# Patient Record
Sex: FEMALE | Race: WHITE | Employment: OTHER | URBAN - METROPOLITAN AREA
[De-identification: names, ages, dates, MRNs, and addresses within clinical notes are randomized per-mention and may not be internally consistent; named-entity substitution may affect disease eponyms.]

---

## 2017-01-03 ENCOUNTER — HOSPITAL ENCOUNTER (OUTPATIENT)
Dept: ULTRASOUND IMAGING | Facility: CLINIC | Age: 52
End: 2017-01-03
Attending: INTERNAL MEDICINE
Payer: COMMERCIAL

## 2017-01-03 ENCOUNTER — HOSPITAL ENCOUNTER (OUTPATIENT)
Dept: MAMMOGRAPHY | Facility: CLINIC | Age: 52
Discharge: HOME | End: 2017-01-03
Attending: INTERNAL MEDICINE | Admitting: INTERNAL MEDICINE
Payer: COMMERCIAL

## 2017-01-03 DIAGNOSIS — N63.0 LUMP OR MASS IN BREAST: ICD-10-CM

## 2017-01-03 DIAGNOSIS — R22.31 AXILLARY LUMP, RIGHT: ICD-10-CM

## 2017-01-03 PROCEDURE — 76642 ULTRASOUND BREAST LIMITED: CPT | Mod: LT

## 2017-01-03 PROCEDURE — G0204 DX MAMMO INCL CAD BI: HCPCS

## 2017-01-04 ENCOUNTER — HOSPITAL ENCOUNTER (OUTPATIENT)
Dept: ULTRASOUND IMAGING | Facility: CLINIC | Age: 52
Discharge: HOME | End: 2017-01-04
Attending: INTERNAL MEDICINE | Admitting: INTERNAL MEDICINE
Payer: COMMERCIAL

## 2017-01-04 DIAGNOSIS — R22.1 NECK SWELLING: ICD-10-CM

## 2017-01-04 PROCEDURE — 76536 US EXAM OF HEAD AND NECK: CPT

## 2017-01-06 ENCOUNTER — TELEPHONE (OUTPATIENT)
Dept: PEDIATRICS | Facility: CLINIC | Age: 52
End: 2017-01-06

## 2017-01-06 NOTE — TELEPHONE ENCOUNTER
Patient was called and advised.  She verbalized understanding.  She stated she will think about it and get back to us.    Camryn Diop RN  Message handled by Nurse Triage.

## 2017-01-06 NOTE — TELEPHONE ENCOUNTER
I can take over prescribing the robaxin. I'm not sure if insurance will cover the trintellix if not prescribed by psychiatry, but I can try prescribing it and see what happens. If it stops working, she will need to return to see psychiatry which might cause a delay in her treatment.    Please let know.    Halle De Los Santos MD  Internal Medicine/Pediatrics

## 2017-01-06 NOTE — TELEPHONE ENCOUNTER
Patient calls with an update.  Is not interested in seeing psychiatry any longer-will still see psychology, as discussed at the last appointment.  Is scheduled for the first time next week.  Patient asking to let Dr De Los Santos know that she was getting Trintellix and Robaxin from her past psychiatrist.  She is asking if Dr De Los Santos would take over prescribing.  She does not need refills at this time.    Patient states that Dr De Los Santos is aware of this.  I see in the notes that Dr De Los Santos wanted patient to continue seeing a psychiatrist.  Please review and advise.    vortioxetine (TRINTELLIX) 10 MG tablet and methocarbamol (ROBAXIN) 500 MG tablet.    Camryn Diop RN  Message handled by Nurse Triage.

## 2017-01-12 ENCOUNTER — CARE COORDINATION (OUTPATIENT)
Dept: CARE COORDINATION | Facility: CLINIC | Age: 52
End: 2017-01-12

## 2017-01-12 NOTE — PROGRESS NOTES
Clinic Care Coordination Contact  [facility]/Voicemail    Referral Source: CTS  Clinical Data: Care Coordinator Outreach  Outreach attempted x 1.  Left message on voicemail with call back information and requested return call.     Plan: Care Coordinator will try to reach patient again in 3-4 weeks.  SAPPHIRE Davis, [facility]  Social Work - Care Coordinator  [facility] [place] [place], and [place]  Phone: [phone]

## 2017-01-18 ENCOUNTER — OFFICE VISIT (OUTPATIENT)
Dept: PSYCHOLOGY | Facility: CLINIC | Age: 52
End: 2017-01-18
Payer: COMMERCIAL

## 2017-01-18 DIAGNOSIS — F33.1 MODERATE EPISODE OF RECURRENT MAJOR DEPRESSIVE DISORDER (H): Primary | ICD-10-CM

## 2017-01-18 DIAGNOSIS — F41.1 GAD (GENERALIZED ANXIETY DISORDER): ICD-10-CM

## 2017-01-18 PROCEDURE — 90791 PSYCH DIAGNOSTIC EVALUATION: CPT | Performed by: SOCIAL WORKER

## 2017-01-18 ASSESSMENT — PATIENT HEALTH QUESTIONNAIRE - PHQ9: 5. POOR APPETITE OR OVEREATING: NEARLY EVERY DAY

## 2017-01-18 ASSESSMENT — ANXIETY QUESTIONNAIRES
7. FEELING AFRAID AS IF SOMETHING AWFUL MIGHT HAPPEN: SEVERAL DAYS
1. FEELING NERVOUS, ANXIOUS, OR ON EDGE: MORE THAN HALF THE DAYS
3. WORRYING TOO MUCH ABOUT DIFFERENT THINGS: MORE THAN HALF THE DAYS
GAD7 TOTAL SCORE: 13
5. BEING SO RESTLESS THAT IT IS HARD TO SIT STILL: MORE THAN HALF THE DAYS
2. NOT BEING ABLE TO STOP OR CONTROL WORRYING: MORE THAN HALF THE DAYS
6. BECOMING EASILY ANNOYED OR IRRITABLE: SEVERAL DAYS
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
## 2017-01-24 ASSESSMENT — PATIENT HEALTH QUESTIONNAIRE - PHQ9: SUM OF ALL RESPONSES TO PHQ QUESTIONS 1-9: 17

## 2017-01-24 ASSESSMENT — ANXIETY QUESTIONNAIRES: GAD7 TOTAL SCORE: 13

## 2017-01-31 ENCOUNTER — TRANSFERRED RECORDS (OUTPATIENT)
Dept: HEALTH INFORMATION MANAGEMENT | Facility: CLINIC | Age: 52
End: 2017-01-31

## 2017-02-02 ENCOUNTER — CARE COORDINATION (OUTPATIENT)
Dept: CARE COORDINATION | Facility: CLINIC | Age: 52
End: 2017-02-02

## 2017-02-02 NOTE — Clinical Note
[place] CARE COORDINATION  [address] [address]  Phone: [phone]      February 2, 2017      Cristela Smith  [address]  [place] [address]    Dear Cristela,  I am the Clinic Care Coordinator that works with your primary care provider's clinic. I recently tried to call and was unable to reach you. Below is a description of what Clinic Care Coordination is and how I can further assist you.     The Clinic Care Coordinator role is a Registered Nurse and/or  who understands the health care system. The goal of Clinic Care Coordination is to help you manage your health and improve access to the [facility] system in the most efficient manner.  The Registered Nurse can assist you in meeting your health care goals by providing education, coordinating services, and strengthening the communication among your providers. The  can assist you with financial, behavioral, psychosocial, and chemical dependency and counseling/psychiatric resources.    Please feel free to keep this letter and contact information to contact me at [phone] with any further questions or concerns that may arise. We at [facility] are focused on providing you with the highest-quality healthcare experience possible and that all starts with you.       Sincerely,     Jessie Lane

## 2017-02-02 NOTE — PROGRESS NOTES
Clinic Care Coordination Contact  [facility]/Voicemail    Referral Source: [facility]  Clinical Data: Care Coordinator Outreach  Outreach attempted x 2.  Left message on voicemail with call back information and requested return call.    Plan: Care Coordinator mailed out care coordination introduction letter on 02/02/17. Care Coordinator will try to reach patient again in 10-14 business days.    SAPPHIRE Davis, [facility]  Social Work - Care Coordinator  [facility]- [place], [place], and [place]  Phone: [phone]

## 2017-02-03 DIAGNOSIS — N95.1 MENOPAUSAL SYNDROME (HOT FLASHES): Primary | ICD-10-CM

## 2017-02-07 ENCOUNTER — TELEPHONE (OUTPATIENT)
Dept: PEDIATRICS | Facility: CLINIC | Age: 52
End: 2017-02-07

## 2017-02-07 DIAGNOSIS — H10.33 ACUTE CONJUNCTIVITIS OF BOTH EYES: Primary | ICD-10-CM

## 2017-02-07 NOTE — TELEPHONE ENCOUNTER
Patient calls.  Was seen by [facility] last night and dx with conjunctivitis.  Treated with Polytrim drops at 2 drops in the affected eye every 2 hours first day and then one drop every 4 hours thereafter.  She states that her eye was closed shut and had mattering and was burning yesterday.  Just started medication this morning at 10 am and has not seen any improvement.  Patient was told to follow up with PCP in 1-2 days.      Advised patient to call tomorrow if no improved for appointment-we can ask to use same day appointment.  Patient verbalized understanding.  Advised to have a  to take her to the appointment.    Camryn Diop RN  Message handled by Nurse Triage.

## 2017-02-08 NOTE — TELEPHONE ENCOUNTER
Request received for more documentation. Notes from PA last year printed and faxed.  Dalila Zabala LPN

## 2017-02-08 NOTE — TELEPHONE ENCOUNTER
Discussed with Dr. De Los Santos and would advise that patient be seen by Opthamology if openings available.   If not, will see patient here in clinic.  I called [facility] and they are able to see patient in their [place] clinic this afternoon.  Patient advised of this, and gave her the number to call to set up an appointment.  Patient in agreement, and will proceed there for evaluation.  No further questions.  DAVID Cassidy RN

## 2017-02-08 NOTE — TELEPHONE ENCOUNTER
Patient called, she can't open her right eye, when does it burns and very blurry. Please call patient back at [phone] ASAP.    Thank you,    Yareli Dsouza

## 2017-02-08 NOTE — TELEPHONE ENCOUNTER
Patient called back.  Appointment scheduled with Dr. De Los Santos this afternoon.  Is very light sensitive.  Eye remains swollen.  Urgency room told patient to follow up with primary.  Ok for appointment here, or should she see an opthamologist-conjunctivitis vs cellulitis vs corneal abrasion?  DAVID Cassidy RN

## 2017-02-14 ENCOUNTER — TRANSFERRED RECORDS (OUTPATIENT)
Dept: HEALTH INFORMATION MANAGEMENT | Facility: CLINIC | Age: 52
End: 2017-02-14

## 2017-02-15 DIAGNOSIS — F51.01 PRIMARY INSOMNIA: Primary | ICD-10-CM

## 2017-02-15 RX ORDER — TRAZODONE HYDROCHLORIDE 50 MG/1
50 TABLET, FILM COATED ORAL
Qty: 90 TABLET | Refills: 1 | Status: SHIPPED | OUTPATIENT
Start: 2017-02-15 | End: 2017-08-25

## 2017-02-15 RX ORDER — TRAZODONE HYDROCHLORIDE 150 MG/1
150 TABLET ORAL AT BEDTIME
Qty: 90 TABLET | Refills: 1 | Status: SHIPPED | OUTPATIENT
Start: 2017-02-15 | End: 2017-08-25

## 2017-02-15 NOTE — TELEPHONE ENCOUNTER
Pt LM at 12:53 pm to check on PA status, call her back on [phone].     Called pt back & notified as below. Pt agrees to the plan. Signed the pended medDolores Banuelos RN  Triage Nurse

## 2017-02-15 NOTE — TELEPHONE ENCOUNTER
Please call patient. Menest not covered. Need to try formulary alternatives as below. vagifem tablets not appropriate for systemic symptoms. Climara patches are the same hormone as the estradiol pills she tried in the past and did not control symptoms. Recommend trial of switching to estropipate. New rx sent.     Halle De Los Santos MD  Internal Medicine/Pediatrics

## 2017-02-15 NOTE — TELEPHONE ENCOUNTER
Pt LM at 12:53 pm that another physician used to rx Trazodone 150 mg + 50 mg 1 tab each qd(200 mg qd) &  agreed to take over prescribing this med for her. Requesting us to send the rx to [facility] pharmacy is [place]. Call back # [phone](OK to LM).     Called [facility] pharmacy to get clarification on med & dose. As per pharmacist, pt has been getting 2 separate rx for Trazodone 150 mg + 50 mg(200 mg qd) from Dr.Sarah Gallo(Ph#[phone]). Last rx was on 01/19/17 for 30# each. Pt has been taking this med for the past 3 months.    Pended med, please review & sign, if appropriate. Thanks.    Vin, RN  Triage Nurse

## 2017-02-23 ENCOUNTER — HOSPITAL ENCOUNTER (OUTPATIENT)
Dept: MRI IMAGING | Facility: CLINIC | Age: 52
Discharge: HOME | End: 2017-02-23
Attending: PHYSICIAN ASSISTANT | Admitting: PHYSICIAN ASSISTANT
Payer: COMMERCIAL

## 2017-02-23 DIAGNOSIS — M54.9 BACK PAIN, UNSPECIFIED BACK LOCATION, UNSPECIFIED BACK PAIN LATERALITY, UNSPECIFIED CHRONICITY: ICD-10-CM

## 2017-02-23 PROCEDURE — 72141 MRI NECK SPINE W/O DYE: CPT

## 2017-03-08 ENCOUNTER — TRANSFERRED RECORDS (OUTPATIENT)
Dept: HEALTH INFORMATION MANAGEMENT | Facility: CLINIC | Age: 52
End: 2017-03-08

## 2017-03-10 ENCOUNTER — RESULTS ONLY (OUTPATIENT)
Dept: OTHER | Facility: CLINIC | Age: 52
End: 2017-03-10

## 2017-03-10 DIAGNOSIS — E53.8 VITAMIN B12 DEFICIENCY (NON ANEMIC): ICD-10-CM

## 2017-03-10 DIAGNOSIS — M51.16 NEURITIS OR RADICULITIS DUE TO RUPTURE OF LUMBAR INTERVERTEBRAL DISC: Primary | ICD-10-CM

## 2017-03-10 PROCEDURE — 81374 HLA I TYPING 1 ANTIGEN LR: CPT | Performed by: ANESTHESIOLOGY

## 2017-03-10 PROCEDURE — 36415 COLL VENOUS BLD VENIPUNCTURE: CPT | Performed by: ANESTHESIOLOGY

## 2017-03-11 NOTE — TELEPHONE ENCOUNTER
CYANOCOBALAMIN (B-12) 2500MCG        Last Written Prescription Date: 11/4/2015  Last Fill Quantity: 90,    # refills: 11  Last Office Visit with G, P or [facility] prescribing provider:  12/26/2016      Lab Results   Component Value Date    WBC 6.7 09/26/2016     Lab Results   Component Value Date    RBC 4.75 09/26/2016     Lab Results   Component Value Date    HGB 13.2 09/26/2016     Lab Results   Component Value Date    HCT 40.6 09/26/2016     No components found for: MCT  Lab Results   Component Value Date    MCV 86 09/26/2016     Lab Results   Component Value Date    MCH 27.8 09/26/2016     Lab Results   Component Value Date    MCHC 32.5 09/26/2016     Lab Results   Component Value Date    RDW 13.9 09/26/2016     Lab Results   Component Value Date     09/26/2016     Lab Results   Component Value Date    AST 18 12/26/2016     Lab Results   Component Value Date    ALT 23 12/26/2016     Creatinine   Date Value Ref Range Status   12/26/2016 0.89 0.52 - 1.04 mg/dL Final

## 2017-03-13 LAB — HLA-B27 QL NAA+PROBE: NORMAL

## 2017-03-15 LAB
B LOCUS: NORMAL
B27TEST METHOD: NORMAL

## 2017-03-16 ENCOUNTER — CARE COORDINATION (OUTPATIENT)
Dept: CARE COORDINATION | Facility: CLINIC | Age: 52
End: 2017-03-16

## 2017-03-16 RX ORDER — CYANOCOBALAMIN (VITAMIN B-12) 2500 MCG
2500 TABLET, SUBLINGUAL SUBLINGUAL DAILY
Qty: 90 TABLET | Refills: 2 | Status: SHIPPED | OUTPATIENT
Start: 2017-03-16

## 2017-03-16 NOTE — TELEPHONE ENCOUNTER
Prescription approved per [facility] Refill Protocol.    Sindy Graves, RN  Triage Nurse

## 2017-03-16 NOTE — PROGRESS NOTES
Clinic Care Coordination Contact      Referral Source: [facility]  Clinical Data: Care Coordinator Outreach  SW reviewed pt's chart which indicated that pt is continue to follow-up with providers as recommended. SW has attempted to reach pt on multiple occasions.     Plan: Care Coordinator mailed out care coordination introduction letter on 02/02/17. Care Coordinator will do no further outreaches at this time.    SAPPHIRE Davis, SW  Social Work - Care Coordinator  [facility]- [place], [place], and [place]  Phone: [phone]

## 2017-03-27 ENCOUNTER — FCC EXTENDED DOCUMENTATION (OUTPATIENT)
Dept: PSYCHOLOGY | Facility: CLINIC | Age: 52
End: 2017-03-27

## 2017-03-27 NOTE — LETTER
3/27/2017      Cristela Smith  [address] [place] [address]        Greeting    Your last date of service at [facility] was Jan. 18,2017.  Since I have not heard from you regarding your desire to continue services with me through [facility], you will be discharged.  In the future, should you desire to seek services again through our clinic, please do not hesitate to call us.      Sincerely,    CECILIO GlezSW

## 2017-03-27 NOTE — PROGRESS NOTES
Discharge Summary  Single Session    Client Name: Cristela Smith MRN#: [id number] YOB: 1965      Intake / Discharge Date: 1/18/2017--3/27/2017      DSM5 Diagnoses: (Sustained by DSM5 Criteria Listed Above)  Diagnoses: 296.32 Major Depressive Disorder, Recurrent Episode, Moderate _ and With anxious distress  300.02 (F41.1) Generalized Anxiety Disorder  Psychosocial & Contextual Factors: Client has been coping with medical issues for a work injury which included concussion, memory problems, vertigo, and visual problems. WHODAS 2.0 (12 item) Score: 44          Presenting Concern:    Client was seeking help for stress management.  She has dealt with many losses and battled drug addiction.  She has been sober since her intensive TX at [facility] 2010.  Client is also struggling with medical issues      Reason for Discharge:  Client did not return and contacted writer explaining that she decided to return to counseling at [facility] where she receives her medication management and has been in counseling before.      Disposition at Time of Last Encounter:   Comments:   Client appeared motivated but was feeling guilt about leaving [facility] where she had been going for counseling in the past.  Client decided to return to [facility] since her therapist has an understanding of her hx and past TX.      Risk Management:   Client reported she had a history of suicidal ideation prior to drug TX at [facility] in 2010 and completed a 6 month program.: wanted to drug up and fall asleep, suicide attempts: prior to going into [facility] and self-injurious behavior: by using large needles IM in her thigh when actively in her drug use.   Client denies current fears or concerns for personal safety.  Client denies current or recent suicidal ideation or behaviors.  Client denies current or recent homicidal ideation or behaviors.  Client denies current or recent self injurious behavior or  ideation.  Client denies other safety concerns.  Client reports there are no firearms in the house.  A safety and risk management plan has not been developed at this time, however client was given the after-hours number / 911 should there be a change in any of these risk factors.   Client noted her brother committed suicide 2 yrs ago Dec 2.      Referred To:  Client called writer after our intake session to state that she will return to [facility] for her counseling since her medication management was continuing at that location.      Richi Cordova, [facility] CenterSW   3/27/2017

## 2017-04-15 DIAGNOSIS — N95.1 MENOPAUSAL SYNDROME (HOT FLASHES): ICD-10-CM

## 2017-04-27 DIAGNOSIS — K59.09 OTHER CONSTIPATION: ICD-10-CM

## 2017-04-27 DIAGNOSIS — K21.9 GASTROESOPHAGEAL REFLUX DISEASE WITHOUT ESOPHAGITIS: ICD-10-CM

## 2017-04-27 RX ORDER — AMOXICILLIN 250 MG
2 CAPSULE ORAL 2 TIMES DAILY
Qty: 120 TABLET | Refills: 5 | Status: SHIPPED | OUTPATIENT
Start: 2017-04-27 | End: 2017-09-13

## 2017-04-27 NOTE — TELEPHONE ENCOUNTER
ROSSY for next office appointment -Patient has stopped all anti-depressants because she feels they decreased her Sodium level.  Is now taking the Vitamin B every other day because her last level was borderline high.  Would like to recheck this level at the next office visit next week.  Also wants to discuss Vitamin Therapy and get RXs for the recommended vitamins.    Refills have been addressed.  1. Omeprazole 40 mg      Last Written Prescription Date: 06/24/16  Last Fill Quantity: 90,  # refills: 3   Last Office Visit with [facility], [facility] or [facility] prescribing provider: 12/26/16                                         Next 5 appointments (look out 90 days)     May 04, 2017  9:00 AM CDT   [facility] with H[facility] De [facility]    [address] [place] [address]  [address]  [facility] [address]   [phone]              Pharmacy has sent a refill request to Dr. Beyer-he has been ordering this medication for patient.  Patient just refilled this medication last week.  2. Senna S      Last Written Prescription Date: 11/21/16  Last Fill Quantity: 120,  # refills: 3   Last Office Visit with [facility], [facility] or [facility] prescribing provider: 12/26/16                                         Next 5 appointments (look out 90 days)     May 04, 2017  9:00 AM CDT   SHORT with Halle De Los Santos MD   [facility]    [address] [place] [address]  [address]  [facility] [address]   [phone]               Prescription approved per [facility] Refill Protocol.  DAVID Cassidy RN

## 2017-05-15 ENCOUNTER — TELEPHONE (OUTPATIENT)
Dept: PEDIATRICS | Facility: CLINIC | Age: 52
End: 2017-05-15

## 2017-05-15 NOTE — TELEPHONE ENCOUNTER
Pharmacy received rejection notice for Estropipate. Not covered by plan and requesting medication change rather than PA. Routed to Provider.  Dalila Zabala LPN

## 2017-05-15 NOTE — TELEPHONE ENCOUNTER
Called pharmacy and they have no other insurance listed for her. They will call her for new primary insurance info.  Dalila Zabala LPN

## 2017-05-15 NOTE — TELEPHONE ENCOUNTER
Called insurance number given by pharmacy. Pharmacy trying to process through work comp insurance. Will call pharmacy when open and have them try her primary insurance.  Dalila Zabala LPN

## 2017-05-16 ENCOUNTER — TRANSFERRED RECORDS (OUTPATIENT)
Dept: HEALTH INFORMATION MANAGEMENT | Facility: CLINIC | Age: 52
End: 2017-05-16

## 2017-05-18 ENCOUNTER — TRANSFERRED RECORDS (OUTPATIENT)
Dept: HEALTH INFORMATION MANAGEMENT | Facility: CLINIC | Age: 52
End: 2017-05-18

## 2017-05-30 ENCOUNTER — TRANSFERRED RECORDS (OUTPATIENT)
Dept: HEALTH INFORMATION MANAGEMENT | Facility: CLINIC | Age: 52
End: 2017-05-30

## 2017-06-22 ENCOUNTER — TELEPHONE (OUTPATIENT)
Dept: PEDIATRICS | Facility: CLINIC | Age: 52
End: 2017-06-22

## 2017-06-22 NOTE — TELEPHONE ENCOUNTER
Received PA request for atorvastatin 20 mg tablets, did PA over covermymeds.  Awaiting response.  Key: [id number]  Mary Ellen Dhillon, CMA

## 2017-06-22 NOTE — TELEPHONE ENCOUNTER
[facility] called no PA was needed on Atorvastatin.     Looks like patient had pharmacy run claim too early prior to her next refill and should be able to fill it on 6/25/2017.    Pharmacy aware of this. May cancel PA request.    Carolyn LOWE RN, BSN, PHN  [employer] RN

## 2017-06-27 ENCOUNTER — TRANSFERRED RECORDS (OUTPATIENT)
Dept: HEALTH INFORMATION MANAGEMENT | Facility: CLINIC | Age: 52
End: 2017-06-27

## 2017-06-28 ENCOUNTER — OFFICE VISIT (OUTPATIENT)
Dept: PEDIATRICS | Facility: CLINIC | Age: 52
End: 2017-06-28
Payer: MEDICARE

## 2017-06-28 VITALS
DIASTOLIC BLOOD PRESSURE: 50 MMHG | HEART RATE: 76 BPM | OXYGEN SATURATION: 96 % | TEMPERATURE: 97.4 F | WEIGHT: 170.4 LBS | SYSTOLIC BLOOD PRESSURE: 84 MMHG | HEIGHT: 61 IN | BODY MASS INDEX: 32.17 KG/M2

## 2017-06-28 DIAGNOSIS — G89.4 CHRONIC PAIN SYNDROME: ICD-10-CM

## 2017-06-28 DIAGNOSIS — R40.0 SOMNOLENCE: ICD-10-CM

## 2017-06-28 DIAGNOSIS — R53.83 FATIGUE, UNSPECIFIED TYPE: Primary | ICD-10-CM

## 2017-06-28 LAB
ALBUMIN SERPL-MCNC: 3.3 G/DL (ref 3.4–5)
ALP SERPL-CCNC: 105 U/L (ref 40–150)
ALT SERPL W P-5'-P-CCNC: 22 U/L (ref 0–50)
ANION GAP SERPL CALCULATED.3IONS-SCNC: 8 MMOL/L (ref 3–14)
AST SERPL W P-5'-P-CCNC: 26 U/L (ref 0–45)
BASOPHILS # BLD AUTO: 0 10E9/L (ref 0–0.2)
BASOPHILS NFR BLD AUTO: 0.4 %
BILIRUB SERPL-MCNC: 0.1 MG/DL (ref 0.2–1.3)
BUN SERPL-MCNC: 7 MG/DL (ref 7–30)
CALCIUM SERPL-MCNC: 8.9 MG/DL (ref 8.5–10.1)
CHLORIDE SERPL-SCNC: 108 MMOL/L (ref 94–109)
CO2 SERPL-SCNC: 28 MMOL/L (ref 20–32)
CREAT SERPL-MCNC: 1.05 MG/DL (ref 0.52–1.04)
DEPRECATED CALCIDIOL+CALCIFEROL SERPL-MC: 30 UG/L (ref 20–75)
DIFFERENTIAL METHOD BLD: NORMAL
EOSINOPHIL # BLD AUTO: 0.2 10E9/L (ref 0–0.7)
EOSINOPHIL NFR BLD AUTO: 5 %
ERYTHROCYTE [DISTWIDTH] IN BLOOD BY AUTOMATED COUNT: 13.4 % (ref 10–15)
ERYTHROCYTE [SEDIMENTATION RATE] IN BLOOD BY WESTERGREN METHOD: 6 MM/H (ref 0–30)
GFR SERPL CREATININE-BSD FRML MDRD: 55 ML/MIN/1.7M2
GLUCOSE SERPL-MCNC: 101 MG/DL (ref 70–99)
HCT VFR BLD AUTO: 40.6 % (ref 35–47)
HGB BLD-MCNC: 12.8 G/DL (ref 11.7–15.7)
LYMPHOCYTES # BLD AUTO: 2.4 10E9/L (ref 0.8–5.3)
LYMPHOCYTES NFR BLD AUTO: 51.2 %
MCH RBC QN AUTO: 29.1 PG (ref 26.5–33)
MCHC RBC AUTO-ENTMCNC: 31.5 G/DL (ref 31.5–36.5)
MCV RBC AUTO: 92 FL (ref 78–100)
MONOCYTES # BLD AUTO: 0.4 10E9/L (ref 0–1.3)
MONOCYTES NFR BLD AUTO: 8.7 %
NEUTROPHILS # BLD AUTO: 1.6 10E9/L (ref 1.6–8.3)
NEUTROPHILS NFR BLD AUTO: 34.7 %
PLATELET # BLD AUTO: 239 10E9/L (ref 150–450)
POTASSIUM SERPL-SCNC: 4.3 MMOL/L (ref 3.4–5.3)
PROT SERPL-MCNC: 6.6 G/DL (ref 6.8–8.8)
RBC # BLD AUTO: 4.4 10E12/L (ref 3.8–5.2)
SODIUM SERPL-SCNC: 144 MMOL/L (ref 133–144)
TSH SERPL DL<=0.005 MIU/L-ACNC: 1.88 MU/L (ref 0.4–4)
VIT B12 SERPL-MCNC: 1562 PG/ML (ref 193–986)
WBC # BLD AUTO: 4.6 10E9/L (ref 4–11)

## 2017-06-28 PROCEDURE — 36415 COLL VENOUS BLD VENIPUNCTURE: CPT | Performed by: INTERNAL MEDICINE

## 2017-06-28 PROCEDURE — 80050 GENERAL HEALTH PANEL: CPT | Performed by: INTERNAL MEDICINE

## 2017-06-28 PROCEDURE — 82607 VITAMIN B-12: CPT | Performed by: INTERNAL MEDICINE

## 2017-06-28 PROCEDURE — 85652 RBC SED RATE AUTOMATED: CPT | Performed by: INTERNAL MEDICINE

## 2017-06-28 PROCEDURE — 99214 OFFICE O/P EST MOD 30 MIN: CPT | Performed by: INTERNAL MEDICINE

## 2017-06-28 PROCEDURE — 82306 VITAMIN D 25 HYDROXY: CPT | Performed by: INTERNAL MEDICINE

## 2017-06-28 NOTE — LETTER
[facility]  [address]  [place] [id number] [phone]      June 29, 2017    Cristela Smith                                                                                                                                                       [address]  [place] [address]        Dear Cristela,    Your labs all look good.    1. Your vitamin D level is at 30 (normal is 30 or higher)    2. Your vitamin B12 levels remain slightly elevated at 1562 (normal is 193-986). This should not cause any symptoms, but you could cut your supplement back furtherl.    3. Your sed rate (measure of inflammation), thyroid function, blood counts, electrolytes and liver function are normal.    4. Your kidney function was slightly off, but similar to values you have had in the past. Your creatinine is 1.05 (normal is 0.52-1.04).    5. Your protein levels (albumin and total protein) are a little low - I recommend trying to increase protein in your diet. This should not be causing your fatigue.     Please let me know if you have any questions. Please follow-up in clinic if your symptoms do not improve. We could consider a sleep study to look for sleep apnea as a next step.    Sincerely,  Halle De Los Santos MD  Internal Medicine/Pediatrics

## 2017-06-28 NOTE — PROGRESS NOTES
SUBJECTIVE:                                                    Cristela Smith is a 52 year old female who presents to clinic today for the following health issues:    Trouble staying awake      Duration: 6-8 months, worsening over last 3 weeks    Description (location/character/radiation): sleepy all the time    Intensity:  severe    Accompanying signs and symptoms: headaches - has had off and on since concussion.     History (similar episodes/previous evaluation): None    Precipitating or alleviating factors: None    Therapies tried and outcome: tried cutting back on medications with no change    Having a hard time staying awake. Has been taking less the amount prescribed of dilaudid - has tried to cut down. Can have up to 8/day, but has been trying not to take more than 4. Has not taken any yet today. Has a hard time falling asleep at night. Usually falls asleep at midnight, up around 6:30 am, but then back in bed. Typically sleeps most of the day and for next 3-4 day afters when has a procedure. Has procedures done weekly. Eyes feel heavy all of the time. Is restless when sleeps. Wakes up to legs shaking at times. Increased dose of Lyrica about 4-5 months ago - taking 300 mg/day - divided 3 times a day. Has no appetite, but still gaining weight. Feels like thinking is slowed. Has history of low sodium in the past. Also has history of bacteremia in the past. No fevers. Has some snoring. Never told stops breathing.    Taking vitamin B12 every third day because was too high last time checked.    Stopped going to counseling and stopped taking antidepressant - months ago because didn't feel like needed any longer. Feels like depression/anxiety has been ok.     CRPS has moved on to legs and hips. Makes difficult to walk and move.     Reviewed and updated as needed this visit by clinical staff  Tobacco  Allergies  Meds  Problems  Med Hx  Surg Hx  Fam Hx  Soc Hx        Reviewed and updated as needed this visit  "by Provider  Allergies  Meds  Problems       -------------------------------------    Problem list and histories reviewed & adjusted, as indicated.  Additional history: as documented    ROS:  Constitutional, HEENT, cardiovascular, pulmonary, gi and gu systems are negative, except as otherwise noted.    Problem list, Medication list, Allergies, and Medical/Social/Surgical histories reviewed in EPIC and updated as appropriate.    OBJECTIVE:                                                    BP (!) 84/50 (BP Location: Right arm, Patient Position: Chair, Cuff Size: Adult Regular)  Pulse 76  Temp 97.4  F (36.3  C) (Tympanic)  Ht 5' 0.5\" (1.537 m)  Wt 170 lb 6.4 oz (77.3 kg)  SpO2 96%  BMI 32.73 kg/m2   Body mass index is 32.73 kg/(m^2).  General Appearance: tired appearing, alert and no distress  Eyes:   no discharge, erythema.  Normal pupils. Eyelids are slightly puffy  Both Ears: normal: no effusions, no erythema, normal landmarks  Nose: no discharge and normal mucosa  Oropharynx: Normal mucosa, pharynx, teeth  Neck: decreased ROM of neck, No adenopathy, no asymmetry, masses, or scars and thyroid normal to palpation  Respiratory: lungs clear to auscultation - no rales, rhonchi or wheezes.  Cardiovascular: regular rate and rhythm, normal S1 S2, no S3 or S4 and no murmur, click or rub.  trace peripheral edema in feet and ankles.  Skin: no rashes or lesions.  Well perfused and normal turgor.  Neuro: CN II-XII intact, normal tone and strength.    Diagnostic Test Results:  Results for orders placed or performed in visit on 06/28/17   Comprehensive metabolic panel   Result Value Ref Range    Sodium 144 133 - 144 mmol/L    Potassium 4.3 3.4 - 5.3 mmol/L    Chloride 108 94 - 109 mmol/L    Carbon Dioxide 28 20 - 32 mmol/L    Anion Gap 8 3 - 14 mmol/L    Glucose 101 (H) 70 - 99 mg/dL    Urea Nitrogen 7 7 - 30 mg/dL    Creatinine 1.05 (H) 0.52 - 1.04 mg/dL    GFR Estimate 55 (L) >60 mL/min/1.7m2    GFR Estimate If Black " 67 >60 mL/min/1.7m2    Calcium 8.9 8.5 - 10.1 mg/dL    Bilirubin Total 0.1 (L) 0.2 - 1.3 mg/dL    Albumin 3.3 (L) 3.4 - 5.0 g/dL    Protein Total 6.6 (L) 6.8 - 8.8 g/dL    Alkaline Phosphatase 105 40 - 150 U/L    ALT 22 0 - 50 U/L    AST 26 0 - 45 U/L   CBC with platelets differential   Result Value Ref Range    WBC 4.6 4.0 - 11.0 10e9/L    RBC Count 4.40 3.8 - 5.2 10e12/L    Hemoglobin 12.8 11.7 - 15.7 g/dL    Hematocrit 40.6 35.0 - 47.0 %    MCV 92 78 - 100 fl    MCH 29.1 26.5 - 33.0 pg    MCHC 31.5 31.5 - 36.5 g/dL    RDW 13.4 10.0 - 15.0 %    Platelet Count 239 150 - 450 10e9/L    Diff Method Automated Method     % Neutrophils 34.7 %    % Lymphocytes 51.2 %    % Monocytes 8.7 %    % Eosinophils 5.0 %    % Basophils 0.4 %    Absolute Neutrophil 1.6 1.6 - 8.3 10e9/L    Absolute Lymphocytes 2.4 0.8 - 5.3 10e9/L    Absolute Monocytes 0.4 0.0 - 1.3 10e9/L    Absolute Eosinophils 0.2 0.0 - 0.7 10e9/L    Absolute Basophils 0.0 0.0 - 0.2 10e9/L   TSH with free T4 reflex   Result Value Ref Range    TSH 1.88 0.40 - 4.00 mU/L   Vitamin B12   Result Value Ref Range    Vitamin B12 1562 (H) 193 - 986 pg/mL   Vitamin D Deficiency   Result Value Ref Range    Vitamin D Deficiency screening 30 20 - 75 ug/L   Erythrocyte sedimentation rate auto   Result Value Ref Range    Sed Rate 6 0 - 30 mm/h        ASSESSMENT/PLAN:                                                      (R53.83) Fatigue, unspecified type  (primary encounter diagnosis)  (R40.0) Somnolence  Comment: basic labs all normal. Could have sleep apnea causing daytime sleepiness. Also is on a lot of medications that can cause fatigue and somnolence; however, she has reduced the dosing of most of the these medications and tolerated them ok in the past  Plan: Comprehensive metabolic panel, CBC with         platelets differential, TSH with free T4         reflex, Vitamin B12, Vitamin D Deficiency,         Erythrocyte sedimentation rate auto  - continue sleep study  - consider  UA, tox screen, neuroimaging if persists    (G89.4) Chronic pain syndrome  Comment: continues to have pain  Plan: continue to follow with Dr. Beyer at pain clinic    Follow up with Provider - 3 months and as needed     [facility] [place]

## 2017-06-28 NOTE — NURSING NOTE
"Chief Complaint   Patient presents with     somnolence       Initial BP (!) 84/50 (BP Location: Right arm, Patient Position: Chair, Cuff Size: Adult Regular)  Pulse 76  Temp 97.4  F (36.3  C) (Tympanic)  Ht 5' 0.5\" (1.537 m)  Wt 170 lb 6.4 oz (77.3 kg)  SpO2 96%  BMI 32.73 kg/m2 Estimated body mass index is 32.73 kg/(m^2) as calculated from the following:    Height as of this encounter: 5' 0.5\" (1.537 m).    Weight as of this encounter: 170 lb 6.4 oz (77.3 kg).  Medication Reconciliation: jonny Zabala LPN      "

## 2017-06-28 NOTE — PATIENT INSTRUCTIONS
Labs today: blood counts, electrolytes, liver function, kidney function, sed rate (measure of inflammation), thyroid function, vitamin b12 and vitamin D

## 2017-06-28 NOTE — MR AVS SNAPSHOT
"              After Visit Summary   6/28/2017    Cristela Smith    MRN: [id number]           Patient Information     Date Of Birth          1965        Visit Information        Provider Department      6/28/2017 9:40 AM Halle De Los Santos MD [facility]        Today's Diagnoses     Fatigue, unspecified type    -  1    Somnolence        Chronic pain syndrome          Care Instructions    Labs today: blood counts, electrolytes, liver function, kidney function, sed rate (measure of inflammation), thyroid function, vitamin b12 and vitamin D          Follow-ups after your visit        Who to contact     If you have questions or need follow up information about today's clinic visit or your schedule please contact [facility] directly at [phone].  Normal or non-critical lab and imaging results will be communicated to you by MyChart, letter or phone within 4 business days after the clinic has received the results. If you do not hear from us within 7 days, please contact the clinic through [employer] or phone. If you have a critical or abnormal lab result, we will notify you by phone as soon as possible.  Submit refill requests through [employer] or call your pharmacy and they will forward the refill request to us. Please allow 3 business days for your refill to be completed.          Additional Information About Your Visit        MyChart Information     [employer] lets you send messages to your doctor, view your test results, renew your prescriptions, schedule appointments and more. To sign up, go to [email or web address] . Click on \"Log in\" on the left side of the screen, which will take you to the Welcome page. Then click on \"Sign up Now\" on the right side of the page.     You will be asked to enter the access code listed below, as well as some personal information. Please follow the directions to create your username and password.     Your access code is: [email or web address]  Expires: 9/26/2017 " "10:25 AM     Your access code will  in 90 days. If you need help or a new code, please call your [place] clinic or [phone].        Care EveryWhere ID     This is your Care EveryWhere ID. This could be used by other organizations to access your [place] medical records  [id number]        Your Vitals Were     Pulse Temperature Height Pulse Oximetry BMI (Body Mass Index)       76 97.4  F (36.3  C) (Tympanic) 5' 0.5\" (1.537 m) 96% 32.73 kg/m2        Blood Pressure from Last 3 Encounters:   17 (!) 84/50   16 94/59   16 97/68    Weight from Last 3 Encounters:   17 170 lb 6.4 oz (77.3 kg)   16 167 lb (75.8 kg)   16 167 lb 3.2 oz (75.8 kg)              We Performed the Following     CBC with platelets differential     Comprehensive metabolic panel     Erythrocyte sedimentation rate auto     TSH with free T4 reflex     Vitamin B12     Vitamin D Deficiency        Primary Care Provider Office Phone # Fax #    Halle De Los Santos [phone]       [facility] [address] [place] [address]        Equal Access to Services     MARY SUAREZ AH: Hadii aad ku hadasho Soomaali, waaxda luqadaha, qaybta kaalmada adeegyada, waxay idiin haydonnien collin [facility] watson. So [facility] [phone].    ATENCIÓN: Si habla español, tiene a rowland disposición servicios gratuitos de asistencia lingüística. Llame al [phone].    We comply with applicable federal civil rights laws and [place] laws. We do not discriminate on the basis of race, color, national origin, age, disability sex, sexual orientation or gender identity.            Thank you!     Thank you for choosing [facility]  for your care. Our goal is always to provide you with excellent care. Hearing back from our patients is one way we can continue to improve our services. Please take a few minutes to complete the written survey that you may receive in the mail after your visit with us. " Thank you!             Your Updated Medication List - Protect others around you: Learn how to safely use, store and throw away your medicines at www.disposemymeds.org.          This list is accurate as of: 6/28/17 10:25 AM.  Always use your most recent med list.                   Brand Name Dispense Instructions for use Diagnosis    albuterol 108 (90 BASE) MCG/ACT Inhaler    PROAIR HFA/PROVENTIL HFA/VENTOLIN HFA    1 Inhaler    Inhale 2 puffs into the lungs every 6 hours as needed for shortness of breath / dyspnea or wheezing    Viral URI with cough       atorvastatin 20 MG tablet    LIPITOR    90 tablet    Take 1 tablet (20 mg) by mouth At Bedtime    Hyperlipidemia LDL goal <130       butalbital-acetaminophen-caffeine -40 MG per tablet    FIORICET/ESGIC     Take 1-2 tablets by mouth 4 times daily as needed        clonazePAM 0.5 MG tablet    klonoPIN     Take 0.5 mg by mouth 2 times daily as needed for anxiety        cyabnocobalamin 2500 MCG sublingual tablet    VITAMIN B-12    90 tablet    Place 2,500 mcg under the tongue daily    Vitamin B12 deficiency (non anemic)       DILAUDID 4 MG tablet   Generic drug:  HYDROmorphone      Take 1 tablet (4 mg) by mouth every 4 hours as needed for moderate to severe pain        esterified estrogens 0.625 MG Tabs tablet    MENEST    90 tablet    Take 1 tablet (0.625 mg) by mouth daily    Menopausal syndrome (hot flashes)       estropipate 1.5 MG tablet    OGEN    90 tablet    Take 1 tablet by mouth once daily.    Menopausal syndrome (hot flashes)       fluconazole 150 MG tablet    DIFLUCAN    4 tablet    Take 1 tablet (150 mg) by mouth every 7 days    Yeast infection of the vagina       fluticasone 50 MCG/ACT spray    FLONASE     Spray 1-2 sprays into both nostrils daily as needed for rhinitis or allergies        hydrOXYzine 50 MG tablet    ATARAX    180 tablet    Take 2 tablets (100 mg) by mouth every 6 hours as needed for anxiety    Anxiety       hyoscyamine 0.125 MG  tablet    ANASPAZ/LEVSIN    40 tablet    Take 1-2 tablets (125-250 mcg) by mouth every 4 hours as needed for cramping    Irritable bowel syndrome without diarrhea       ipratropium - albuterol 0.5 mg/2.5 mg/3 mL 0.5-2.5 (3) MG/3ML neb solution    DUONEB    360 mL    Take 1 vial (3 mLs) by nebulization every 4 hours as needed for shortness of breath / dyspnea or wheezing    COPD exacerbation (H)       Lactobacillus Acidophilus Powd     1 Bottle    Take once daily as directed    BV (bacterial vaginosis)       methocarbamol 500 MG tablet    ROBAXIN     Take 500 mg by mouth 4 times daily as needed for muscle spasms        MS CONTIN 15 MG 12 hr tablet   Generic drug:  morphine      Take 15 mg by mouth every 12 hours        naloxegol 25 MG Tabs tablet     30 tablet    Take 1 tablet (25 mg) by mouth every morning (before breakfast)    Drug-induced constipation       naproxen 500 MG tablet    NAPROSYN    60 tablet    Take 1 tablet (500 mg) by mouth 2 times daily (with meals) As needed for shoulder pain.    Superior glenoid labrum lesion of right shoulder, subsequent encounter, Chronic pain syndrome, Complex regional pain syndrome type 2 of right upper extremity       omeprazole 40 MG capsule    priLOSEC    90 capsule    Take 1 capsule (40 mg) by mouth daily    Gastroesophageal reflux disease without esophagitis       ondansetron 4 MG ODT tab    ZOFRAN ODT    20 tablet    Take 1 tablet (4 mg) by mouth every 6 hours as needed for nausea or vomiting    Nausea       order for DME     1 Units    Equipment being ordered: Other: nebulizer Treatment Diagnosis: shortness of breath, ciopd    COPD exacerbation (H)       polyethylene glycol powder    MIRALAX    510 g    Take 17 g (1 capful) by mouth daily as needed for constipation    Constipation, unspecified constipation type       pregabalin 75 MG capsule    LYRICA    90 capsule    Take 1 capsule (75 mg) by mouth 3 times daily    Complex regional pain syndrome type 2 of right upper  extremity, Fibromyalgia, Chronic pain syndrome       senna-docusate 8.6-50 MG per tablet    SENOKOT-S;PERICOLACE    120 tablet    Take 2 tablets by mouth 2 times daily    Other constipation       spironolactone 25 MG tablet    ALDACTONE    90 tablet    Take 1 tablet (25 mg) by mouth daily    Swelling       TraMADol HCl 150 MG Cp24           * traZODone 150 MG tablet    DESYREL    90 tablet    Take 1 tablet (150 mg) by mouth At Bedtime Take it with Trazodone 50 mg    Primary insomnia       * traZODone 50 MG tablet    DESYREL    90 tablet    Take 1 tablet (50 mg) by mouth nightly as needed for sleep Take it with the Trazodone 150 mg    Primary insomnia       * Notice:  This list has 2 medication(s) that are the same as other medications prescribed for you. Read the directions carefully, and ask your doctor or other care provider to review them with you.

## 2017-07-03 ENCOUNTER — OFFICE VISIT (OUTPATIENT)
Dept: PEDIATRICS | Facility: CLINIC | Age: 52
End: 2017-07-03
Payer: MEDICARE

## 2017-07-03 ENCOUNTER — TELEPHONE (OUTPATIENT)
Dept: PEDIATRICS | Facility: CLINIC | Age: 52
End: 2017-07-03

## 2017-07-03 VITALS
DIASTOLIC BLOOD PRESSURE: 62 MMHG | HEIGHT: 61 IN | OXYGEN SATURATION: 97 % | SYSTOLIC BLOOD PRESSURE: 94 MMHG | WEIGHT: 169.7 LBS | BODY MASS INDEX: 32.04 KG/M2 | HEART RATE: 79 BPM | TEMPERATURE: 97.2 F

## 2017-07-03 DIAGNOSIS — M79.10 MUSCLE PAIN: ICD-10-CM

## 2017-07-03 DIAGNOSIS — S80.861A TICK BITE OF RIGHT LOWER LEG, INITIAL ENCOUNTER: ICD-10-CM

## 2017-07-03 DIAGNOSIS — R40.0 SOMNOLENCE: Primary | ICD-10-CM
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
## 2017-07-03 DIAGNOSIS — K13.0 CHEILITIS: ICD-10-CM

## 2017-07-03 LAB
ALBUMIN UR-MCNC: NEGATIVE MG/DL
APPEARANCE UR: CLEAR
BILIRUB UR QL STRIP: NEGATIVE
CK SERPL-CCNC: 109 U/L (ref 30–225)
COLOR UR AUTO: YELLOW
GLUCOSE UR STRIP-MCNC: NEGATIVE MG/DL
HGB UR QL STRIP: NEGATIVE
KETONES UR STRIP-MCNC: NEGATIVE MG/DL
LEUKOCYTE ESTERASE UR QL STRIP: NEGATIVE
NITRATE UR QL: NEGATIVE
PH UR STRIP: 8 PH (ref 5–7)
SP GR UR STRIP: 1.01 (ref 1–1.03)
URN SPEC COLLECT METH UR: ABNORMAL
UROBILINOGEN UR STRIP-ACNC: 0.2 EU/DL (ref 0.2–1)

## 2017-07-03 PROCEDURE — 86618 LYME DISEASE ANTIBODY: CPT | Performed by: INTERNAL MEDICINE

## 2017-07-03 PROCEDURE — 99214 OFFICE O/P EST MOD 30 MIN: CPT | Performed by: INTERNAL MEDICINE

## 2017-07-03 PROCEDURE — 81003 URINALYSIS AUTO W/O SCOPE: CPT | Performed by: INTERNAL MEDICINE

## 2017-07-03 PROCEDURE — 82550 ASSAY OF CK (CPK): CPT | Performed by: INTERNAL MEDICINE

## 2017-07-03 PROCEDURE — 36415 COLL VENOUS BLD VENIPUNCTURE: CPT | Performed by: INTERNAL MEDICINE

## 2017-07-03 NOTE — TELEPHONE ENCOUNTER
Patient states that she seems to be worse this week than last. States she remembered that she had a tick bite 3 weeks ago. Barely saw it go down the drain in the shower. Muscles ache worse. Area is red. Difficulty staying awake. Swelling in ankles getting worse. Difficulty climbing stairs. Appointment scheduled.  Karoline Gil RN

## 2017-07-03 NOTE — PATIENT INSTRUCTIONS
1. Check urine, CK (muscle test) and Lyme disease testing  2. For lips - try clotrimazole cream (lotrimin) cream twice a day  3. Stop vitamin B12  4. Start vitamin D3 6669-2681 units once a day  5. Could consider sleep study for daytime sleepiness  6. If sleepiness or balance not improving, will have see Neurology

## 2017-07-03 NOTE — MR AVS SNAPSHOT
"              After Visit Summary   7/3/2017    Cristela Smith    MRN: [id number]           Patient Information     Date Of Birth          1965        Visit Information        Provider Department      7/3/2017 11:20 AM [facility]alle De [facility]        Today's Diagnoses     Somnolence    -  1    Muscle pain        Tick bite of right lower leg, initial encounter        Cheilitis          Care Instructions    1. Check urine, CK (muscle test) and Lyme disease testing  2. For lips - try clotrimazole cream (lotrimin) cream twice a day  3. Stop vitamin B12  4. Start vitamin D3 9064-8280 units once a day  5. Could consider sleep study for daytime sleepiness  6. If sleepiness or balance not improving, will have see Neurology          Follow-ups after your visit        Who to contact     If you have questions or need follow up information about today's clinic visit or your schedule please contact [facility] directly at [phone].  Normal or non-critical lab and imaging results will be communicated to you by [employer], letter or phone within 4 business days after the clinic has received the results. If you do not hear from us within 7 days, please contact the clinic through [employer] or phone. If you have a critical or abnormal lab result, we will notify you by phone as soon as possible.  Submit refill requests through [employer] or call your pharmacy and they will forward the refill request to us. Please allow 3 business days for your refill to be completed.          Additional Information About Your Visit        [employer] Information     [employer] lets you send messages to your doctor, view your test results, renew your prescriptions, schedule appointments and more. To sign up, go to [email or web address]t . Click on \"Log in\" on the left side of the screen, which will take you to the Welcome page. Then click on \"Sign up Now\" on the right side of the page.     You will be asked to enter the " "access code listed below, as well as some personal information. Please follow the directions to create your username and password.     Your access code is: [email or web address]  Expires: 2017 10:25 AM     Your access code will  in 90 days. If you need help or a new code, please call your [place] clinic or [phone].        Care EveryWhere ID     This is your Care EveryWhere ID. This could be used by other organizations to access your [place] medical records  [id number]        Your Vitals Were     Pulse Temperature Height Pulse Oximetry BMI (Body Mass Index)       79 97.2  F (36.2  C) (Tympanic) 5' 0.5\" (1.537 m) 97% 32.6 kg/m2        Blood Pressure from Last 3 Encounters:   17 94/62   17 (!) 84/50   16 94/59    Weight from Last 3 Encounters:   17 169 lb 11.2 oz (77 kg)   17 170 lb 6.4 oz (77.3 kg)   16 167 lb (75.8 kg)              We Performed the Following     *UA reflex to Microscopic and Culture ([place] and [facility] (except [place] and [place])     CK total     Lyme Disease Ana Lilia with reflex to WB Serum        Primary Care Provider Office Phone # Fax #    Halle De Los Santos [phone]       [facility] [address] [place] [address]        Equal Access to Services     STACI SUAREZ AH: Hadii ninoska ku hadasho Soomaali, waaxda luqadaha, qaybta kaalmada adeegyada, daniel membreno . So [facility] [phone].    ATENCIÓN: Si habla freyaañol, tiene a rowland disposición servicios gratuitos de asistencia lingüística. Llame al [phone].    We comply with applicable federal civil rights laws and [place] laws. We do not discriminate on the basis of race, color, national origin, age, disability sex, sexual orientation or gender identity.            Thank you!     Thank you for choosing [facility]  for your care. Our goal is always to provide you with excellent care. Hearing back from our patients " is one way we can continue to improve our services. Please take a few minutes to complete the written survey that you may receive in the mail after your visit with us. Thank you!             Your Updated Medication List - Protect others around you: Learn how to safely use, store and throw away your medicines at www.disposemymeds.org.          This list is accurate as of: 7/3/17 12:00 PM.  Always use your most recent med list.                   Brand Name Dispense Instructions for use Diagnosis    albuterol 108 (90 BASE) MCG/ACT Inhaler    PROAIR HFA/PROVENTIL HFA/VENTOLIN HFA    1 Inhaler    Inhale 2 puffs into the lungs every 6 hours as needed for shortness of breath / dyspnea or wheezing    Viral URI with cough       atorvastatin 20 MG tablet    LIPITOR    90 tablet    Take 1 tablet (20 mg) by mouth At Bedtime    Hyperlipidemia LDL goal <130       butalbital-acetaminophen-caffeine -40 MG per tablet    FIORICET/ESGIC     Take 1-2 tablets by mouth 4 times daily as needed        clonazePAM 0.5 MG tablet    klonoPIN     Take 0.5 mg by mouth 2 times daily as needed for anxiety        cyabnocobalamin 2500 MCG sublingual tablet    VITAMIN B-12    90 tablet    Place 2,500 mcg under the tongue daily    Vitamin B12 deficiency (non anemic)       DILAUDID 4 MG tablet   Generic drug:  HYDROmorphone      Take 1 tablet (4 mg) by mouth every 4 hours as needed for moderate to severe pain        esterified estrogens 0.625 MG Tabs tablet    MENEST    90 tablet    Take 1 tablet (0.625 mg) by mouth daily    Menopausal syndrome (hot flashes)       estropipate 1.5 MG tablet    OGEN    90 tablet    Take 1 tablet by mouth once daily.    Menopausal syndrome (hot flashes)       fluconazole 150 MG tablet    DIFLUCAN    4 tablet    Take 1 tablet (150 mg) by mouth every 7 days    Yeast infection of the vagina       fluticasone 50 MCG/ACT spray    FLONASE     Spray 1-2 sprays into both nostrils daily as needed for rhinitis or allergies         hydrOXYzine 50 MG tablet    ATARAX    180 tablet    Take 2 tablets (100 mg) by mouth every 6 hours as needed for anxiety    Anxiety       hyoscyamine 0.125 MG tablet    ANASPAZ/LEVSIN    40 tablet    Take 1-2 tablets (125-250 mcg) by mouth every 4 hours as needed for cramping    Irritable bowel syndrome without diarrhea       ipratropium - albuterol 0.5 mg/2.5 mg/3 mL 0.5-2.5 (3) MG/3ML neb solution    DUONEB    360 mL    Take 1 vial (3 mLs) by nebulization every 4 hours as needed for shortness of breath / dyspnea or wheezing    COPD exacerbation (H)       Lactobacillus Acidophilus Powd     1 Bottle    Take once daily as directed    BV (bacterial vaginosis)       methocarbamol 500 MG tablet    ROBAXIN     Take 500 mg by mouth 4 times daily as needed for muscle spasms        MS CONTIN 15 MG 12 hr tablet   Generic drug:  morphine      Take 15 mg by mouth every 12 hours        naloxegol 25 MG Tabs tablet     30 tablet    Take 1 tablet (25 mg) by mouth every morning (before breakfast)    Drug-induced constipation       naproxen 500 MG tablet    NAPROSYN    60 tablet    Take 1 tablet (500 mg) by mouth 2 times daily (with meals) As needed for shoulder pain.    Superior glenoid labrum lesion of right shoulder, subsequent encounter, Chronic pain syndrome, Complex regional pain syndrome type 2 of right upper extremity       omeprazole 40 MG capsule    priLOSEC    90 capsule    Take 1 capsule (40 mg) by mouth daily    Gastroesophageal reflux disease without esophagitis       ondansetron 4 MG ODT tab    ZOFRAN ODT    20 tablet    Take 1 tablet (4 mg) by mouth every 6 hours as needed for nausea or vomiting    Nausea       order for DME     1 Units    Equipment being ordered: Other: nebulizer Treatment Diagnosis: shortness of breath, ciopd    COPD exacerbation (H)       polyethylene glycol powder    MIRALAX    510 g    Take 17 g (1 capful) by mouth daily as needed for constipation    Constipation, unspecified constipation  type       pregabalin 75 MG capsule    LYRICA    90 capsule    Take 1 capsule (75 mg) by mouth 3 times daily    Complex regional pain syndrome type 2 of right upper extremity, Fibromyalgia, Chronic pain syndrome       senna-docusate 8.6-50 MG per tablet    SENOKOT-S;PERICOLACE    120 tablet    Take 2 tablets by mouth 2 times daily    Other constipation       spironolactone 25 MG tablet    ALDACTONE    90 tablet    Take 1 tablet (25 mg) by mouth daily    Swelling       TraMADol HCl 150 MG Cp24           * traZODone 150 MG tablet    DESYREL    90 tablet    Take 1 tablet (150 mg) by mouth At Bedtime Take it with Trazodone 50 mg    Primary insomnia       * traZODone 50 MG tablet    DESYREL    90 tablet    Take 1 tablet (50 mg) by mouth nightly as needed for sleep Take it with the Trazodone 150 mg    Primary insomnia       * Notice:  This list has 2 medication(s) that are the same as other medications prescribed for you. Read the directions carefully, and ask your doctor or other care provider to review them with you.

## 2017-07-03 NOTE — LETTER
[facility]  [address]  [place] [id number] [phone]   July 5, 2017    Cristela Smith  [address] [place] [address]      Dear Cristela,    Here is a summary of your recent test results:    Your Lyme disease testing, CK (muscle test) and urine are all normal.     Your test results are enclosed.      Please contact me if you have any questions.           Thank you very much for choosing [facility]    Best regards,    Halle De Los Santos MD        Results for orders placed or performed in visit on 07/03/17   CK total   Result Value Ref [place]    CK Total 109 30 - 225 U/L   *UA reflex to Microscopic and Culture ([place] and [facility] (except [place] and [place])   Result Value Ref [place]    Color Urine Yellow     Appearance Urine Clear     Glucose Urine Negative NEG mg/dL    Bilirubin Urine Negative NEG    Ketones Urine Negative NEG mg/dL    Specific Gravity Urine 1.010 1.003 - 1.035    Blood Urine Negative NEG    pH Urine 8.0 (H) 5.0 - 7.0 pH    Protein Albumin Urine Negative NEG mg/dL    Urobilinogen Urine 0.2 0.2 - 1.0 EU/dL    Nitrite Urine Negative NEG    Leukocyte Esterase Urine Negative NEG    Source Midstream Urine    Lyme Disease Ana Lilia with reflex to WB Serum   Result Value Ref [place]    Lyme Disease Antibodies Serum 0.04 0.00 - 0.89

## 2017-07-03 NOTE — NURSING NOTE
"Chief Complaint   Patient presents with     Musculoskeletal Problem       Initial BP 94/62 (BP Location: Right arm, Patient Position: Chair, Cuff Size: Adult Regular)  Pulse 79  Temp 97.2  F (36.2  C) (Tympanic)  Ht 5' 0.5\" (1.537 m)  Wt 169 lb 11.2 oz (77 kg)  SpO2 97%  BMI 32.6 kg/m2 Estimated body mass index is 32.6 kg/(m^2) as calculated from the following:    Height as of this encounter: 5' 0.5\" (1.537 m).    Weight as of this encounter: 169 lb 11.2 oz (77 kg).  Medication Reconciliation: complete   Dalila Zabala LPN      "

## 2017-07-03 NOTE — PROGRESS NOTES
SUBJECTIVE:                                                    Cristela Smith is a 52 year old female who presents to clinic today for the following health issues:    Muscles aches      Duration: over 1 month    Description (location/character/radiation): muscle aches, itching, intense fatigue    Intensity:  severe    Accompanying signs and symptoms: weakness, pain, falling    History (similar episodes/previous evaluation): None    Precipitating or alleviating factors: recent tick bite    Therapies tried and outcome: None    Patient see on 6/28 for similar symptoms. Had normal CMP, CBC, ESR, TSH, vitamin B12 at that time. Vitamin D borderline low, but not enough to explain symptoms. Patient remembered since last visit, had tick bite 3-4 weeks ago over right lateral thigh. Still with red spot in this area. No target lesions. Also did not discuss at last visit that skin itches everywhere. Feels like bugs crawling all over. Feels like has a rash everywhere with tiny bumps, but significant other cannot feel the rash. Has problems going up/down stairs - due to pain and weakness. No appetite, but no weight loss. Just tired and wants to sleep all of the time. 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 Fell yesterday twice - once fell over garden barrel and the other time on the cement steps outside. Balance feels off. Kept falling sleep when talking yesterday. Off depression medications, but feels it is going ok. Doesn't think any of this is related to mental health. Does not snore significantly. Continues on multiple medications that can cause decreased alertness, but patient continues to take less than prescribed. Has an appointment with Dr. Beyer again this week.    Lips - has had dryness in corner of lips and cannot get to go away.      Reviewed and updated as needed this visit by clinical staff  Tobacco  Allergies  Meds  Problems  Med Hx  Surg Hx  Fam Hx  Soc Hx        Reviewed and  "updated as needed this visit by Provider  Allergies  Meds  Problems         -------------------------------------    Problem list and histories reviewed & adjusted, as indicated.  Additional history: as documented    ROS:  Constitutional, HEENT, cardiovascular, pulmonary, gi and gu systems are negative, except as otherwise noted.    Problem list, Medication list, Allergies, and Medical/Social/Surgical histories reviewed in [facility] and updated as appropriate.    OBJECTIVE:                                                    BP 94/62 (BP Location: Right arm, Patient Position: Chair, Cuff Size: Adult Regular)  Pulse 79  Temp 97.2  F (36.2  C) (Tympanic)  Ht 5' 0.5\" (1.537 m)  Wt 169 lb 11.2 oz (77 kg)  SpO2 97%  BMI 32.6 kg/m2   Body mass index is 32.6 kg/(m^2).  General Appearance: tired appearing  Eyes:   no discharge, erythema.  Normal pupils.  Respiratory: lungs clear to auscultation - no rales, rhonchi or wheezes.  Cardiovascular: regular rate and rhythm, normal S1 S2, no S3 or S4 and no murmur, click or rub.  No peripheral edema.  Skin: about 1 cm area of dark erythema in area of previous tick bite that appears to be healing  Neurological: Normal strength and tone, sensory exam grossly normal, mentation intact and speech normal    Diagnostic Test Results:  Results for orders placed or performed in visit on 07/03/17   CK total   Result Value Ref Range    CK Total 109 30 - 225 U/L   *UA reflex to Microscopic and Culture ([place] and [facility] (except [place] and [place])   Result Value Ref Range    Color Urine Yellow     Appearance Urine Clear     Glucose Urine Negative NEG mg/dL    Bilirubin Urine Negative NEG    Ketones Urine Negative NEG mg/dL    Specific Gravity Urine 1.010 1.003 - 1.035    Blood Urine Negative NEG    pH Urine 8.0 (H) 5.0 - 7.0 pH    Protein Albumin Urine Negative NEG mg/dL    Urobilinogen Urine 0.2 0.2 - 1.0 EU/dL    Nitrite Urine Negative NEG    Leukocyte Esterase Urine Negative " NEG    Source Midstream Urine    Lyme Disease Ana Lilia with reflex to WB Serum   Result Value Ref Range    Lyme Disease Antibodies Serum 0.04 0.00 - 0.89        ASSESSMENT/PLAN:                                                      (R40.0) Somnolence  (primary encounter diagnosis)  Comment: previous CBC, ESR, TSH, CMP normal. UA, CK and Lyme testing negative from this visit. Ddx includes medication induced, toxic exposures, infections (less likely with normal CBC and ESR), ruled out electrolyte disturbances last visit, could have structural abnormality causing somnolence but would be unusual with diffuse pain and lack of focal findings. Untreated DAVON could cause daytime sleepiness, but no cause her muscle pain.  Plan: *UA reflex to Microscopic and Culture ([place]         and [facility] (except [place] and         [place])  - if symptoms persist, would recommend Neurology evaluation  - could consider sleep study, urine tox screen, screen for heavy metals (less likely without anemia)    (M79.1) Muscle pain  Comment: likely due to fibro vs CRPS. Lab testing all negative. Autoimmune cause unlikely with normal ESR, CBC and previous negative autoimmune work-up  Plan: CK total  - will f/u with Dr. Beyer as scheduled    (S80.861A,  W57.XXXA) Tick bite of right lower leg, initial encounter  Plan: Lyme Disease Ana Lilia with reflex to WB Serum  - testing negative, consider repeating in 2-4 weeks if continues to have symptoms    (K13.0) Cheilitis  Comment: discussed most common cause of angular chelitis is fungal  Plan: will start clotrimazole twice a day to area - will purchase over the counter    Follow up with Provider - if not improving     [facility] CYRUS

## 2017-07-05 ENCOUNTER — TELEPHONE (OUTPATIENT)
Dept: PEDIATRICS | Facility: CLINIC | Age: 52
End: 2017-07-05

## 2017-07-05 LAB — B BURGDOR IGG+IGM SER QL: 0.04 (ref 0–0.89)

## 2017-07-05 NOTE — TELEPHONE ENCOUNTER
Pt LM at 2:04 pm requesting call back, awaiting lab results. Call back # [phone].    Called pt back & left generic message that all her lab results are normal & we have mailed the result today. Advised to call us back with any questions.      Notes Recorded by Halle De Los Santos MD on 7/5/2017 at 11:35 AM  Please send result letter with labs. Thanks!    Dear Cristela,  Your Lyme disease testing, CK (muscle test) and urine are all normal.     Vin, RN  Triage Nurse

## 2017-07-07 DIAGNOSIS — R60.9 SWELLING: ICD-10-CM

## 2017-07-09 NOTE — TELEPHONE ENCOUNTER
spironolactone (ALDACTONE) 25 MG tablet      Last Written Prescription Date: 07/15/2016  Last Fill Quantity: 90, # refills: 3  Last Office Visit with [facility], [facility] or [facility] prescribing provider: 07/03/2017       Potassium   Date Value Ref Range Status   06/28/2017 4.3 3.4 - 5.3 mmol/L Final     Creatinine   Date Value Ref Range Status   06/28/2017 1.05 (H) 0.52 - 1.04 mg/dL Final     BP Readings from Last 3 Encounters:   07/03/17 94/62   06/28/17 (!) 84/50   12/26/16 94/59

## 2017-07-11 RX ORDER — SPIRONOLACTONE 25 MG/1
TABLET ORAL
Qty: 90 TABLET | Refills: 3 | Status: SHIPPED | OUTPATIENT
Start: 2017-07-11 | End: 2017-09-13

## 2017-07-11 NOTE — TELEPHONE ENCOUNTER
Routing refill request to provider for review/approval because:  BP low, please advise. Routing to prescribing provider.   Patrica Langley, RN  Triage Nurse

## 2017-07-20 ENCOUNTER — TRANSFERRED RECORDS (OUTPATIENT)
Dept: HEALTH INFORMATION MANAGEMENT | Facility: CLINIC | Age: 52
End: 2017-07-20

## 2017-07-25 ENCOUNTER — TRANSFERRED RECORDS (OUTPATIENT)
Dept: HEALTH INFORMATION MANAGEMENT | Facility: CLINIC | Age: 52
End: 2017-07-25

## 2017-08-03 ENCOUNTER — TELEPHONE (OUTPATIENT)
Dept: PEDIATRICS | Facility: CLINIC | Age: 52
End: 2017-08-03

## 2017-08-03 DIAGNOSIS — G89.4 CHRONIC PAIN SYNDROME: Primary | ICD-10-CM

## 2017-08-03 NOTE — TELEPHONE ENCOUNTER
Patient calling requesting an appointment.  [place] in her ear.  Then developed a rash over her entire body-rash is resolving.   Treated with Benadryl spray.    Inside of her ear and the back of her throat is painful.  Feels like her lungs are congested.  No cough.  No wheezing.  No shortness of breath.  Large amount of nasal discharge.  Temps yesterday of 102.  Appointment advised.  Is asking if Dr. De Los Santos would see her tomorrow?  Advised if she develops wheezing or shortness of breath, will need to be seen sooner.  DAVID Cassidy RN    Discussed with Dr. De Los Santos-Ok to schedule an appointment tomorrow.  Appointment scheduled.    Patient is asking for assistance with insurance-would care coordination be able to able to assist with this?  States that Darren dropped her.  She is on Social Security and has no other income.  Referral placed for care coordination.  DAVID Cassidy RN

## 2017-08-03 NOTE — PROGRESS NOTES
SUBJECTIVE:                                                    Cristela Smith is a 52 year old female who presents to clinic today for the following health issues:      RESPIRATORY SYMPTOMS      Duration: 3 weeks    Description  nasal congestion, rhinorrhea, sore throat, facial pain/pressure, cough, fever, chills, ear pain right, headache, fatigue/malaise, hoarse voice, myalgias, conjunctival irritation and chest congestion    Severity: severe    Accompanying signs and symptoms: None    History (predisposing factors):  none    Precipitating or alleviating factors: None    Therapies tried and outcome:  rest and fluids oral decongestant OTC NSAID    Felt like something crawled into ear. Broke out in a rash all over and developed some ankle swelling. Rash improved, but continues to have some ankle swelling. Improved some with elevation. Energy decreased. Lungs have felt more congested over last week, wheezing some. Now having some cough. Fever two days ago to 102. Having a lot of nasal congestion and sinus pressure. Sinus pressure worse on the right side. Still with pain in base of right ear and upper part of back of throat. Feels like ear is wet, but nothing in there. Sore into right side of neck. Has bad teeth in that area.     Angular Cheilitis: Mouth always dry. Getting rash at corners of mouth. Tried lotrimin for 2 weeks, but did not help. Has been trying to get off as many medications as she can.     Reviewed and updated as needed this visit by clinical staffTobacco  Allergies  Meds  Problems  Med Hx  Surg Hx  Fam Hx  Soc Hx        Reviewed and updated as needed this visit by Provider  Allergies  Meds  Problems         -------------------------------------    Problem list and histories reviewed & adjusted, as indicated.  Additional history: as documented    ROS:  Constitutional, HEENT, cardiovascular, pulmonary, gi and gu systems are negative, except as otherwise noted.      Problem list, Medication  "list, Allergies, and Medical/Social/Surgical histories reviewed in [facility] and updated as appropriate.    OBJECTIVE:                                                    BP 98/60 (BP Location: Right arm, Patient Position: Chair, Cuff Size: Adult Regular)  Pulse 86  Temp 97.6  F (36.4  C) (Tympanic)  Ht 5' 0.5\" (1.537 m)  Wt 173 lb 11.2 oz (78.8 kg)  SpO2 95%  BMI 33.37 kg/m2   Body mass index is 33.37 kg/(m^2).  General Appearance: tired appearing, alert and no distress  Eyes:   no discharge, erythema.  Normal pupils.  Both Ears: normal: no effusions, no erythema, normal landmarks  Nose: congested  Sinuses:  maxillary, frontal tenderness on right  Oropharynx: mouth dry with clear PND  Neck: Supple. Scattered slightly enlarged, tender cervical lymph nodes right > left  Respiratory: good air movement, some coarse sounds in right mid-lung, no crackles or wheezes  Cardiovascular: regular rate and rhythm, normal S1 S2, no S3 or S4 and no murmur, click or rub.  No peripheral edema.  Skin: scattered skin colored 1-2 mm papules over right forearm with excoriation.  Well perfused and normal turgor.    Diagnostic Test Results:  none      ASSESSMENT/PLAN:                                                      (J01.90) Acute sinusitis with symptoms > 10 days  (primary encounter diagnosis)  Comment: sx c/w sinus infection  Plan: amoxicillin-clavulanate (AUGMENTIN) 875-125 MG         per tablet  - Augmentin bid for 10 days  - flonase    (K13.0) Angular cheilitis  Comment: did not respond to lotrimin.   Plan: mupirocin (BACTROBAN) 2 % ointment  - might clear up with abx  - if not, can fill mupirocin  - discussed good oral hygiene and trying to treat dry mouth with biotene products.    (B37.3) Yeast infection of the vagina  Comment: tends to get with antibiotics  Plan: fluconazole (DIFLUCAN) 150 MG tablet    (J31.0) Other chronic rhinitis  Comment: needs a refill  Plan: fluticasone (FLONASE) 50 MCG/ACT spray    (R11.0) " Nausea  Comment: tends to get with antibiotics  Plan: ondansetron (ZOFRAN ODT) 4 MG ODT tab      Follow up with Provider - 3-6 months     [facility] CYRUS

## 2017-08-04 ENCOUNTER — OFFICE VISIT (OUTPATIENT)
Dept: PEDIATRICS | Facility: CLINIC | Age: 52
End: 2017-08-04
Payer: MEDICARE

## 2017-08-04 VITALS
DIASTOLIC BLOOD PRESSURE: 60 MMHG | TEMPERATURE: 97.6 F | WEIGHT: 173.7 LBS | HEART RATE: 86 BPM | BODY MASS INDEX: 32.8 KG/M2 | HEIGHT: 61 IN | SYSTOLIC BLOOD PRESSURE: 98 MMHG | OXYGEN SATURATION: 95 %

## 2017-08-04 DIAGNOSIS — K13.0 ANGULAR CHEILITIS: ICD-10-CM

## 2017-08-04 DIAGNOSIS — J01.90 ACUTE SINUSITIS WITH SYMPTOMS > 10 DAYS: Primary | ICD-10-CM

## 2017-08-04 DIAGNOSIS — R11.0 NAUSEA: ICD-10-CM

## 2017-08-04 DIAGNOSIS — B37.31 YEAST INFECTION OF THE VAGINA: ICD-10-CM

## 2017-08-04 DIAGNOSIS — J31.0 OTHER CHRONIC RHINITIS: ICD-10-CM

## 2017-08-04 PROCEDURE — 99214 OFFICE O/P EST MOD 30 MIN: CPT | Performed by: INTERNAL MEDICINE

## 2017-08-04 RX ORDER — MUPIROCIN 20 MG/G
OINTMENT TOPICAL 3 TIMES DAILY
Qty: 22 G | Refills: 1 | Status: SHIPPED | OUTPATIENT
Start: 2017-08-04 | End: 2017-08-09

## 2017-08-04 RX ORDER — ONDANSETRON 4 MG/1
4 TABLET, ORALLY DISINTEGRATING ORAL EVERY 6 HOURS PRN
Qty: 20 TABLET | Refills: 0 | Status: SHIPPED | OUTPATIENT
Start: 2017-08-04 | End: 2017-11-16

## 2017-08-04 RX ORDER — FLUTICASONE PROPIONATE 50 MCG
1-2 SPRAY, SUSPENSION (ML) NASAL DAILY PRN
Qty: 1 BOTTLE | Refills: 3 | Status: SHIPPED | OUTPATIENT
Start: 2017-08-04 | End: 2018-06-05

## 2017-08-04 RX ORDER — FLUCONAZOLE 150 MG/1
150 TABLET ORAL ONCE
Qty: 2 TABLET | Refills: 0 | Status: SHIPPED | OUTPATIENT
Start: 2017-08-04 | End: 2017-08-04

## 2017-08-04 ASSESSMENT — PATIENT HEALTH QUESTIONNAIRE - PHQ9: SUM OF ALL RESPONSES TO PHQ QUESTIONS 1-9: 13

## 2017-08-04 NOTE — NURSING NOTE
"Chief Complaint   Patient presents with     Fever       Initial BP 98/60 (BP Location: Right arm, Patient Position: Chair, Cuff Size: Adult Regular)  Pulse 86  Temp 97.6  F (36.4  C) (Tympanic)  Ht 5' 0.5\" (1.537 m)  Wt 173 lb 11.2 oz (78.8 kg)  SpO2 95%  BMI 33.37 kg/m2 Estimated body mass index is 33.37 kg/(m^2) as calculated from the following:    Height as of this encounter: 5' 0.5\" (1.537 m).    Weight as of this encounter: 173 lb 11.2 oz (78.8 kg).  Medication Reconciliation: complete   Dalila Zabala LPN      "

## 2017-08-04 NOTE — PATIENT INSTRUCTIONS
1. Antibiotic - Augmentin 1 pill twice a day for 10 days  2. If mouth doesn't clear up with antibiotic. Then fill mupirocin ointment  3. Can use barrier cream such as vaseline or zinc oxide   4. Diflucan for yeast infection  5. Refilled flonase  6. Try biotene products for dry mouth - might help to try the spray to help with moisture. Also have a mouthwash, toothpaste.

## 2017-08-04 NOTE — MR AVS SNAPSHOT
"              After Visit Summary   8/4/2017    Cristela Smith    MRN: [id number]           Patient Information     Date Of Birth          1965        Visit Information        Provider Department      8/4/2017 11:40 AM Halle De [facility]        Today's Diagnoses     Acute sinusitis with symptoms > 10 days    -  1    Angular cheilitis        Yeast infection of the vagina        Other chronic rhinitis          Care Instructions    1. Antibiotic - Augmentin 1 pill twice a day for 10 days  2. If mouth doesn't clear up with antibiotic. Then fill mupirocin ointment  3. Can use barrier cream such as vaseline or zinc oxide   4. Diflucan for yeast infection  5. Refilled flonase  6. Try biotene products for dry mouth - might help to try the spray to help with moisture. Also have a mouthwash, toothpaste.           Follow-ups after your visit        Who to contact     If you have questions or need follow up information about today's clinic visit or your schedule please contact [facility] directly at [phone].  Normal or non-critical lab and imaging results will be communicated to you by Medisashart, letter or phone within 4 business days after the clinic has received the results. If you do not hear from us within 7 days, please contact the clinic through Ne[employer] Koozi[employer] or phone. If you have a critical or abnormal lab result, we will notify you by phone as soon as possible.  Submit refill requests through [employer] App Store or call your pharmacy and they will forward the refill request to us. Please allow 3 business days for your refill to be completed.          Additional Information About Your Visit        MedisasharRow[employer] Systems Information     [employer] App Store lets you send messages to your doctor, view your test results, renew your prescriptions, schedule appointments and more. To sign up, go to [email or web address] . Click on \"Log in\" on the left side of the screen, which will take you to the Welcome page. Then " "click on \"Sign up Now\" on the right side of the page.     You will be asked to enter the access code listed below, as well as some personal information. Please follow the directions to create your username and password.     Your access code is: [email or web address]  Expires: 2017 10:25 AM     Your access code will  in 90 days. If you need help or a new code, please call your [facility] clinic or [phone].        Care EveryWhere ID     This is your Care EveryWhere ID. This could be used by other organizations to access your [facility] medical records  [id number]        Your Vitals Were     Pulse Temperature Height Pulse Oximetry BMI (Body Mass Index)       86 97.6  F (36.4  C) (Tympanic) 5' 0.5\" (1.537 m) 95% 33.37 kg/m2        Blood Pressure from Last 3 Encounters:   17 98/60   17 94/62   17 (!) 84/50    Weight from Last 3 Encounters:   17 173 lb 11.2 oz (78.8 kg)   17 169 lb 11.2 oz (77 kg)   17 170 lb 6.4 oz (77.3 kg)              Today, you had the following     No orders found for display         Today's Medication Changes          These changes are accurate as of: 17 12:16 PM.  If you have any questions, ask your nurse or doctor.               Start taking these medicines.        Dose/Directions    amoxicillin-clavulanate 875-125 MG per tablet   Commonly known as:  AUGMENTIN   Used for:  Acute sinusitis with symptoms > 10 days   Started by:  Halle De Los Santos MD        Dose:  1 tablet   Take 1 tablet by mouth 2 times daily   Quantity:  20 tablet   Refills:  0       mupirocin 2 % ointment   Commonly known as:  BACTROBAN   Used for:  Angular cheilitis   Started by:  Halle De Los Santos MD        Apply topically 3 times daily for 5 days   Quantity:  22 g   Refills:  1         These medicines have changed or have updated prescriptions.        Dose/Directions    fluconazole 150 MG tablet   Commonly known as:  DIFLUCAN   This may have changed:    - when to take " this  - additional instructions   Used for:  Yeast infection of the vagina   Changed by:  Halle De Los Santos MD        Dose:  150 mg   Take 1 tablet (150 mg) by mouth once for 1 dose May repeat after 48 hours if still having symptoms   Quantity:  2 tablet   Refills:  0         Stop taking these medicines if you haven't already. Please contact your care team if you have questions.     esterified estrogens 0.625 MG Tabs tablet   Commonly known as:  MENEST   Stopped by:  Halle De Los Santos MD                Where to get your medicines      These medications were sent to [id number] - [place], [place] - [address]  [address]  [place] [address]     Phone:  [phone]     amoxicillin-clavulanate 875-125 MG per tablet    fluconazole 150 MG tablet    fluticasone 50 MCG/ACT spray         Some of these will need a paper prescription and others can be bought over the counter.  Ask your nurse if you have questions.     Bring a paper prescription for each of these medications     mupirocin 2 % ointment                Primary Care Provider Office Phone # Fax #    Halle De Los Santos [phone]       [address] [place] [address]        Equal Access to Services     [facility] AH: Hadii aad roldan hadasho Sosusieali, waaxda luqadaha, qaybta kaalmada adeegyada, daniel watson. So [facility] [phone].    ATENCIÓN: Si habla español, tiene a rowland disposición servicios gratuitos de asistencia lingüística. Llame al [phone].    We comply with applicable federal civil rights laws and [place] laws. We do not discriminate on the basis of race, color, national origin, age, disability sex, sexual orientation or gender identity.            Thank you!     Thank you for choosing [facility]  for your care. Our goal is always to provide you with excellent care. Hearing back from our patients is one way we can continue to  improve our services. Please take a few minutes to complete the written survey that you may receive in the mail after your visit with us. Thank you!             Your Updated Medication List - Protect others around you: Learn how to safely use, store and throw away your medicines at www.disposemymeds.org.          This list is accurate as of: 8/4/17 12:16 PM.  Always use your most recent med list.                   Brand Name Dispense Instructions for use Diagnosis    albuterol 108 (90 BASE) MCG/ACT Inhaler    PROAIR HFA/PROVENTIL HFA/VENTOLIN HFA    1 Inhaler    Inhale 2 puffs into the lungs every 6 hours as needed for shortness of breath / dyspnea or wheezing    Viral URI with cough       amoxicillin-clavulanate 875-125 MG per tablet    AUGMENTIN    20 tablet    Take 1 tablet by mouth 2 times daily    Acute sinusitis with symptoms > 10 days       atorvastatin 20 MG tablet    LIPITOR    90 tablet    Take 1 tablet (20 mg) by mouth At Bedtime    Hyperlipidemia LDL goal <130       butalbital-acetaminophen-caffeine -40 MG per tablet    FIORICET/ESGIC     Take 1-2 tablets by mouth 4 times daily as needed        clonazePAM 0.5 MG tablet    klonoPIN     Take 0.5 mg by mouth 2 times daily as needed for anxiety        cyabnocobalamin 2500 MCG sublingual tablet    VITAMIN B-12    90 tablet    Place 2,500 mcg under the tongue daily    Vitamin B12 deficiency (non anemic)       DILAUDID 4 MG tablet   Generic drug:  HYDROmorphone      Take 1 tablet (4 mg) by mouth every 4 hours as needed for moderate to severe pain        estropipate 1.5 MG tablet    OGEN    90 tablet    Take 1 tablet by mouth once daily.    Menopausal syndrome (hot flashes)       fluconazole 150 MG tablet    DIFLUCAN    2 tablet    Take 1 tablet (150 mg) by mouth once for 1 dose May repeat after 48 hours if still having symptoms    Yeast infection of the vagina       fluticasone 50 MCG/ACT spray    FLONASE    1 Bottle    Spray 1-2 sprays into both nostrils  daily as needed for rhinitis or allergies    Other chronic rhinitis       hydrOXYzine 50 MG tablet    ATARAX    180 tablet    Take 2 tablets (100 mg) by mouth every 6 hours as needed for anxiety    Anxiety       hyoscyamine 0.125 MG tablet    ANASPAZ/LEVSIN    40 tablet    Take 1-2 tablets (125-250 mcg) by mouth every 4 hours as needed for cramping    Irritable bowel syndrome without diarrhea       ipratropium - albuterol 0.5 mg/2.5 mg/3 mL 0.5-2.5 (3) MG/3ML neb solution    DUONEB    360 mL    Take 1 vial (3 mLs) by nebulization every 4 hours as needed for shortness of breath / dyspnea or wheezing    COPD exacerbation (H)       Lactobacillus Acidophilus Powd     1 Bottle    Take once daily as directed    BV (bacterial vaginosis)       methocarbamol 500 MG tablet    ROBAXIN     Take 500 mg by mouth 4 times daily as needed for muscle spasms        MS CONTIN 15 MG 12 hr tablet   Generic drug:  morphine      Take 15 mg by mouth every 12 hours        mupirocin 2 % ointment    BACTROBAN    22 g    Apply topically 3 times daily for 5 days    Angular cheilitis       naloxegol 25 MG Tabs tablet     30 tablet    Take 1 tablet (25 mg) by mouth every morning (before breakfast)    Drug-induced constipation       naproxen 500 MG tablet    NAPROSYN    60 tablet    Take 1 tablet (500 mg) by mouth 2 times daily (with meals) As needed for shoulder pain.    Superior glenoid labrum lesion of right shoulder, subsequent encounter, Chronic pain syndrome, Complex regional pain syndrome type 2 of right upper extremity       omeprazole 40 MG capsule    priLOSEC    90 capsule    Take 1 capsule (40 mg) by mouth daily    Gastroesophageal reflux disease without esophagitis       ondansetron 4 MG ODT tab    ZOFRAN ODT    20 tablet    Take 1 tablet (4 mg) by mouth every 6 hours as needed for nausea or vomiting    Nausea       order for DME     1 Units    Equipment being ordered: Other: nebulizer Treatment Diagnosis: shortness of breath, ciopd     COPD exacerbation (H)       polyethylene glycol powder    MIRALAX    510 g    Take 17 g (1 capful) by mouth daily as needed for constipation    Constipation, unspecified constipation type       pregabalin 75 MG capsule    LYRICA    90 capsule    Take 1 capsule (75 mg) by mouth 3 times daily    Complex regional pain syndrome type 2 of right upper extremity, Fibromyalgia, Chronic pain syndrome       senna-docusate 8.6-50 MG per tablet    SENOKOT-S;PERICOLACE    120 tablet    Take 2 tablets by mouth 2 times daily    Other constipation       spironolactone 25 MG tablet    ALDACTONE    90 tablet    TAKE 1 TABLET (25 MG) BY MOUTH DAILY    Swelling       TraMADol HCl 150 MG Cp24           * traZODone 150 MG tablet    DESYREL    90 tablet    Take 1 tablet (150 mg) by mouth At Bedtime Take it with Trazodone 50 mg    Primary insomnia       * traZODone 50 MG tablet    DESYREL    90 tablet    Take 1 tablet (50 mg) by mouth nightly as needed for sleep Take it with the Trazodone 150 mg    Primary insomnia       * Notice:  This list has 2 medication(s) that are the same as other medications prescribed for you. Read the directions carefully, and ask your doctor or other care provider to review them with you.

## 2017-08-07 ENCOUNTER — CARE COORDINATION (OUTPATIENT)
Dept: CARE COORDINATION | Facility: CLINIC | Age: 52
End: 2017-08-07

## 2017-08-07 NOTE — PROGRESS NOTES
Clinic Care Coordination Contact  OUTREACH    Referral Information:  Referral Source: PCP  Reason for Contact: follow up to why patient no longer has Darren MA  Care Conference: No     Universal Utilization:   ED Visits in last year: 2  Hospital visits in last year: 1  Last PCP appointment: 11/04/16  Missed Appointments: 7     Multiple Providers or Specialists: none noted    Clinical Concerns:  Current Medical Concerns: none stated    Current Behavioral Concerns: none noted at this time    Education Provided to patient: n/a   Clinical Pathway Name: Depression  Clinical Pathway: not reviewe a this time    Medication Management:  Not reviewed at this time     Functional Status:  Mobility Status: Independent  Equipment Currently Used at Home: none  Transportation: no new info           Psychosocial:  Current living arrangement:: I live in a private home with family  Financial/Insurance: Patient was determined to be eligible for SSDI as of 04/01/2015. That lead to patient to patient being eligable for Medicare as of 04/01/2017  Patient was [employer] MA as from 06/01-08/03/201. Patient became straight MA on 08/04/2017.  Both are MA programs, one has a network requirement & straight MA does not. As to why the change occurred without a request by patient, that would need to be answered by [facility] Financial Team. Patient stated that she had the phone # for this  Patient asked if there was other concerns or needs that Clinic Care CoordinatorFABIO could assist with.  Patient given Clinic Care CoordinatorFABIO's name & phone # to call if she has further questions or needs additional assistance     Resources and Interventions:  Current Resources:  (none identified);  (n/a)  PAS Number:  (n/a)  Senior Linkage Line Referral Placed:  (n/a)  Advanced Care Plans/Directives on file:: No  Referrals Placed: Other  ([facility] assistance team)     Goals:                  Barriers: None as patient has 2 forms of health care coverage  & did not loose any coverage  Strengths: Patient continues to have 2 forms of health care coverage, Medicare & MA  Patient/Caregiver understanding: Patient is to call [facility] financial team for explanation of change from Darren YATES to amador YATES  Frequency of Care Coordination: PRN  Upcoming appointment:  (none scheduled with PCP at this time)     Plan: Patient can outreach to Clinic Care Coordinator, FABIO as needed in the future..  DEBBIE Daley, [facility]  Clinic Care Coordinator, FABIO with [facility]  [phone]

## 2017-08-15 ENCOUNTER — TRANSFERRED RECORDS (OUTPATIENT)
Dept: HEALTH INFORMATION MANAGEMENT | Facility: CLINIC | Age: 52
End: 2017-08-15

## 2017-08-25 DIAGNOSIS — M79.7 FIBROMYALGIA: ICD-10-CM

## 2017-08-25 DIAGNOSIS — G56.41 COMPLEX REGIONAL PAIN SYNDROME TYPE 2 OF RIGHT UPPER EXTREMITY: ICD-10-CM

## 2017-08-25 DIAGNOSIS — G89.4 CHRONIC PAIN SYNDROME: ICD-10-CM

## 2017-08-25 DIAGNOSIS — F51.01 PRIMARY INSOMNIA: ICD-10-CM

## 2017-08-25 RX ORDER — TRAZODONE HYDROCHLORIDE 150 MG/1
TABLET ORAL
Qty: 30 TABLET | Refills: 0 | Status: SHIPPED | OUTPATIENT
Start: 2017-08-25 | End: 2017-10-19

## 2017-08-25 RX ORDER — TRAZODONE HYDROCHLORIDE 50 MG/1
TABLET, FILM COATED ORAL
Qty: 30 TABLET | Refills: 0 | Status: SHIPPED | OUTPATIENT
Start: 2017-08-25 | End: 2017-10-19

## 2017-08-25 RX ORDER — CHOLECALCIFEROL (VITAMIN D3) 50 MCG
2000 TABLET ORAL DAILY
Qty: 100 TABLET | Refills: 0 | COMMUNITY
Start: 2017-08-25

## 2017-08-25 RX ORDER — VITAMIN E 268 MG
CAPSULE ORAL DAILY
Qty: 30 CAPSULE | Refills: 0 | COMMUNITY
Start: 2017-08-25

## 2017-08-25 NOTE — TELEPHONE ENCOUNTER
Trazodone 150mg tablet and 50mg tablet     Last Written Prescription Date: 2/15/2017  Last Fill Quantity: 90, # refills: 1  Last Office Visit with [facility], P or [facility] prescribing provider: 8/4/2017        BP Readings from Last 3 Encounters:   08/04/17 98/60   07/03/17 94/62   06/28/17 (!) 84/50     Last PHQ-9 score on record=   PHQ-9 SCORE 8/4/2017   Total Score -   Total Score 13     Prescription approved per [facility] Refill Protocol.    Carolyn LOWE RN, BSN, PHN  [facility]

## 2017-08-25 NOTE — TELEPHONE ENCOUNTER
Pt. also calling with update.    1) She has finished abx and it worked well. Had yeast infection sx so used 2 rounds of diflucan as directed.    2) She is now taking:    Vitamin D 2000IU  Vitamin E  Omega Red    3) She also requested a refill for Lyrica 300mg. RN reviewed, advised pt. to call her pain clinic for this as PCP has not refilled this since November 2016 and not at that specific dosage. Pt. Stated her pain clinic provider was not in. Patient advised to see if a covering provider at the pain clinic could compete this for her. Pt. Expressed understanding of this.     Carolyn LOWE RN, BSN, PHN  [place] [facility] RN

## 2017-08-30 ENCOUNTER — HOSPITAL ENCOUNTER (OUTPATIENT)
Dept: MRI IMAGING | Facility: CLINIC | Age: 52
Discharge: HOME | End: 2017-08-30
Attending: ANESTHESIOLOGY | Admitting: ANESTHESIOLOGY
Payer: MEDICARE

## 2017-08-30 DIAGNOSIS — G90.523 COMPLEX REGIONAL PAIN SYNDROME TYPE 1 OF BOTH LOWER EXTREMITIES: ICD-10-CM

## 2017-08-30 PROCEDURE — 73721 MRI JNT OF LWR EXTRE W/O DYE: CPT | Mod: RT

## 2017-09-12 ENCOUNTER — TRANSFERRED RECORDS (OUTPATIENT)
Dept: HEALTH INFORMATION MANAGEMENT | Facility: CLINIC | Age: 52
End: 2017-09-12

## 2017-09-13 ENCOUNTER — OFFICE VISIT (OUTPATIENT)
Dept: PEDIATRICS | Facility: CLINIC | Age: 52
End: 2017-09-13
Payer: MEDICARE

## 2017-09-13 ENCOUNTER — TELEPHONE (OUTPATIENT)
Dept: PEDIATRICS | Facility: CLINIC | Age: 52
End: 2017-09-13

## 2017-09-13 VITALS
HEIGHT: 61 IN | DIASTOLIC BLOOD PRESSURE: 62 MMHG | BODY MASS INDEX: 31.95 KG/M2 | TEMPERATURE: 96.4 F | HEART RATE: 62 BPM | WEIGHT: 169.2 LBS | OXYGEN SATURATION: 97 % | SYSTOLIC BLOOD PRESSURE: 92 MMHG

## 2017-09-13 DIAGNOSIS — G47.19 EXCESSIVE DAYTIME SLEEPINESS: ICD-10-CM

## 2017-09-13 DIAGNOSIS — M25.561 ACUTE PAIN OF BOTH KNEES: ICD-10-CM

## 2017-09-13 DIAGNOSIS — R00.0 TACHYCARDIA: ICD-10-CM

## 2017-09-13 DIAGNOSIS — G56.41 COMPLEX REGIONAL PAIN SYNDROME TYPE 2 OF RIGHT UPPER EXTREMITY: ICD-10-CM

## 2017-09-13 DIAGNOSIS — F41.9 ANXIETY: ICD-10-CM

## 2017-09-13 DIAGNOSIS — R29.6 FALLS FREQUENTLY: ICD-10-CM

## 2017-09-13 DIAGNOSIS — Z72.0 TOBACCO ABUSE: Primary | ICD-10-CM

## 2017-09-13 DIAGNOSIS — M25.562 ACUTE PAIN OF BOTH KNEES: ICD-10-CM

## 2017-09-13 DIAGNOSIS — R29.810 FACIAL DROOP: Primary | ICD-10-CM

## 2017-09-13 DIAGNOSIS — K59.09 OTHER CONSTIPATION: ICD-10-CM

## 2017-09-13 DIAGNOSIS — R42 DIZZINESS: ICD-10-CM

## 2017-09-13 DIAGNOSIS — F32.1 MODERATE MAJOR DEPRESSION (H): ICD-10-CM

## 2017-09-13 PROCEDURE — 99215 OFFICE O/P EST HI 40 MIN: CPT | Performed by: INTERNAL MEDICINE

## 2017-09-13 RX ORDER — MAG HYDROX/ALUMINUM HYD/SIMETH 400-400-40
300 SUSPENSION, ORAL (FINAL DOSE FORM) ORAL DAILY
COMMUNITY
Start: 2017-09-13 | End: 2019-02-07

## 2017-09-13 RX ORDER — AMOXICILLIN 250 MG
2 CAPSULE ORAL 2 TIMES DAILY
Qty: 120 TABLET | Refills: 5 | Status: SHIPPED | OUTPATIENT
Start: 2017-09-13 | End: 2018-05-09

## 2017-09-13 RX ORDER — OXYCODONE HYDROCHLORIDE 5 MG/1
10 TABLET ORAL EVERY 4 HOURS PRN
Qty: 18 TABLET | Refills: 0 | COMMUNITY
Start: 2017-09-13

## 2017-09-13 RX ORDER — VARENICLINE TARTRATE 1 MG/1
1 TABLET, FILM COATED ORAL 2 TIMES DAILY
Qty: 56 TABLET | Refills: 2 | Status: SHIPPED | OUTPATIENT
Start: 2017-09-13 | End: 2017-11-27

## 2017-09-13 RX ORDER — PREGABALIN 100 MG/1
100 CAPSULE ORAL DAILY
Qty: 270 CAPSULE | Refills: 1 | COMMUNITY
Start: 2017-09-13 | End: 2018-04-12

## 2017-09-13 RX ORDER — MELOXICAM 15 MG/1
15 TABLET ORAL DAILY
Qty: 30 TABLET | Refills: 1 | COMMUNITY
Start: 2017-09-13 | End: 2019-08-23

## 2017-09-13 NOTE — MR AVS SNAPSHOT
After Visit Summary   2017    Cristela Smith    MRN: [id number]           Patient Information     Date Of Birth          1965        Visit Information        Provider Department      2017 10:00 AM Halle De Los Santos MD [facility]        Today's Diagnoses     Facial droop    -  1    Dizziness        Falls frequently        Excessive daytime sleepiness        Acute pain of both knees        Other constipation          Care Instructions    1. Try stopping the spironolactone - monitor for swelling  2. Talk with Dr. Beyer about who he recommends for orthopedics   3. You will be contacted to set up the the MRI of the brain and blood vessels  [facility] Radiology [phone]  4. Refilled senokot-S  5. If MRI ok, would recommend see sleep doctors to talk about excessive daytime sleepiness          Follow-ups after your visit        Future tests that were ordered for you today     Open Future Orders        Priority Expected Expires Ordered    MR Brain w/o & w Contrast Routine  2018    MRA Angiogram Brain and Neck Routine  2018            Who to contact     If you have questions or need follow up information about today's clinic visit or your schedule please contact [facility] directly at [phone].  Normal or non-critical lab and imaging results will be communicated to you by MyChart, letter or phone within 4 business days after the clinic has received the results. If you do not hear from us within 7 days, please contact the clinic through MyChart or phone. If you have a critical or abnormal lab result, we will notify you by phone as soon as possible.  Submit refill requests through [employer] or call your pharmacy and they will forward the refill request to us. Please allow 3 business days for your refill to be completed.          Additional Information About Your Visit        MyChart Information     [employer] lets you  "send messages to your doctor, view your test results, renew your prescriptions, schedule appointments and more. To sign up, go to [email or web address] . Click on \"Log in\" on the left side of the screen, which will take you to the Welcome page. Then click on \"Sign up Now\" on the right side of the page.     You will be asked to enter the access code listed below, as well as some personal information. Please follow the directions to create your username and password.     Your access code is: [email or web address]  Expires: 2017 10:25 AM     Your access code will  in 90 days. If you need help or a new code, please call your [place] clinic or [phone].        Care EveryWhere ID     This is your [facility] EveryWhere ID. This could be used by other organizations to access your [place] medical records  [id number]        Your Vitals Were     Pulse Temperature Height Pulse Oximetry BMI (Body Mass Index)       62 96.4  F (35.8  C) (Tympanic) 5' 0.5\" (1.537 m) 97% 32.5 kg/m2        Blood Pressure from Last 3 Encounters:   17 92/62   17 98/60   17 94/62    Weight from Last 3 Encounters:   17 169 lb 3.2 oz (76.7 kg)   17 173 lb 11.2 oz (78.8 kg)   17 169 lb 11.2 oz (77 kg)                 Today's Medication Changes          These changes are accurate as of: 17 11:08 AM.  If you have any questions, ask your nurse or doctor.               These medicines have changed or have updated prescriptions.        Dose/Directions    LYRICA 100 MG capsule   This may have changed:  Another medication with the same name was removed. Continue taking this medication, and follow the directions you see here.   Generic drug:  pregabalin   Changed by:  Halle De Los Santos MD        Dose:  100 mg   Take 1 capsule (100 mg) by mouth daily And 2 capsules (200 mg) at bedtime   Quantity:  270 capsule   Refills:  1         Stop taking these medicines if you haven't already. Please contact your care team " if you have questions.     amoxicillin-clavulanate 875-125 MG per tablet   Commonly known as:  AUGMENTIN   Stopped by:  Halle De Los Santos MD           DILAUDID 4 MG tablet   Generic drug:  HYDROmorphone   Stopped by:  Halle De Los Santos MD           ipratropium - albuterol 0.5 mg/2.5 mg/3 mL 0.5-2.5 (3) MG/3ML neb solution   Commonly known as:  DUONEB   Stopped by:  Halle De Los Santos MD           Lactobacillus Acidophilus Powd   Stopped by:  Halle De Los Santos MD           methocarbamol 500 MG tablet   Commonly known as:  ROBAXIN   Stopped by:  Halle De Los Santos MD           naproxen 500 MG tablet   Commonly known as:  NAPROSYN   Stopped by:  Halle De Los Santos MD           omeprazole 40 MG capsule   Commonly known as:  priLOSEC   Stopped by:  Halle De Los Santos MD           order for DME   Stopped by:  Halle De Los Santos MD           polyethylene glycol powder   Commonly known as:  MIRALAX   Stopped by:  Halle De Los Santos MD           spironolactone 25 MG tablet   Commonly known as:  ALDACTONE   Stopped by:  Halle De Los Santos MD           TraMADol HCl 150 MG Cp24   Stopped by:  Halle De Los Santos MD                Where to get your medicines      These medications were sent to [id number] - [place], [place] - [address] [place] [address]     Phone:  [phone]     senna-docusate 8.6-50 MG per tablet                Primary Care Provider Office Phone # Fax #    Halle De Los Santos [phone]       [facility] [place] [address]        Equal Access to Services     [facility]: Hadii aad ku hadasho Soomaali, waaxda luqadaha, qaybta kaalmada adeegdion, daniel idiin hayaan adeeg kharash la'aan . So [facility] [phone].    ATENCIÓN: Si habla español, tiene a rowland disposición servicios gratuitos de asistencia lingüística. Llame al [phone].    We comply with applicable federal civil rights laws  and [place] laws. We do not discriminate on the basis of race, color, national origin, age, disability sex, sexual orientation or gender identity.            Thank you!     Thank you for choosing [facility]  for your care. Our goal is always to provide you with excellent care. Hearing back from our patients is one way we can continue to improve our services. Please take a few minutes to complete the written survey that you may receive in the mail after your visit with us. Thank you!             Your Updated Medication List - Protect others around you: Learn how to safely use, store and throw away your medicines at www.disposemymeds.org.          This list is accurate as of: 9/13/17 11:08 AM.  Always use your most recent med list.                   Brand Name Dispense Instructions for use Diagnosis    albuterol 108 (90 BASE) MCG/ACT Inhaler    PROAIR HFA/PROVENTIL HFA/VENTOLIN HFA    1 Inhaler    Inhale 2 puffs into the lungs every 6 hours as needed for shortness of breath / dyspnea or wheezing    Viral URI with cough       atorvastatin 20 MG tablet    LIPITOR    90 tablet    Take 1 tablet (20 mg) by mouth At Bedtime    Hyperlipidemia LDL goal <130       butalbital-acetaminophen-caffeine -40 MG per tablet    FIORICET/ESGIC     Take 1-2 tablets by mouth 4 times daily as needed        clonazePAM 0.5 MG tablet    klonoPIN     Take 0.5 mg by mouth 2 times daily as needed for anxiety        cyabnocobalamin 2500 MCG sublingual tablet    VITAMIN B-12    90 tablet    Place 2,500 mcg under the tongue daily    Vitamin B12 deficiency (non anemic)       estropipate 1.5 MG tablet    OGEN    90 tablet    Take 1 tablet by mouth once daily.    Menopausal syndrome (hot flashes)       fluticasone 50 MCG/ACT spray    FLONASE    1 Bottle    Spray 1-2 sprays into both nostrils daily as needed for rhinitis or allergies    Other chronic rhinitis       hydrOXYzine 50 MG tablet    ATARAX    180 tablet    Take 2 tablets  (100 mg) by mouth every 6 hours as needed for anxiety    Anxiety       hyoscyamine 0.125 MG tablet    ANASPAZ/LEVSIN    40 tablet    Take 1-2 tablets (125-250 mcg) by mouth every 4 hours as needed for cramping    Irritable bowel syndrome without diarrhea       LYRICA 100 MG capsule   Generic drug:  pregabalin     270 capsule    Take 1 capsule (100 mg) by mouth daily And 2 capsules (200 mg) at bedtime        MOBIC 15 MG tablet   Generic drug:  meloxicam     30 tablet    Take 1 tablet (15 mg) by mouth daily        MS CONTIN 15 MG 12 hr tablet   Generic drug:  morphine      Take 15 mg by mouth every 12 hours        naloxegol 25 MG Tabs tablet     30 tablet    Take 1 tablet (25 mg) by mouth every morning (before breakfast)    Drug-induced constipation       Omega-3 Krill Oil 300 MG Caps      Take 300 mg by mouth daily        ondansetron 4 MG ODT tab    ZOFRAN ODT    20 tablet    Take 1 tablet (4 mg) by mouth every 6 hours as needed for nausea or vomiting    Nausea       oxyCODONE 5 MG IR tablet    ROXICODONE    18 tablet    Take 2 tablets (10 mg) by mouth every 4 hours as needed for pain        senna-docusate 8.6-50 MG per tablet    SENOKOT-S;PERICOLACE    120 tablet    Take 2 tablets by mouth 2 times daily    Other constipation       * traZODone 50 MG tablet    DESYREL    30 tablet    Take 1 tablet (50mg) by mouth nightly with 150mg tablets as needed for sleep for total of 200mg.    Primary insomnia       * traZODone 150 MG tablet    DESYREL    30 tablet    Take 1 tablet (150mg) by mouth at bedtime with 50mg tablets for total of 200mg.    Primary insomnia       vitamin D 2000 UNITS tablet     100 tablet    Take 2,000 Units by mouth daily        vitamin E 400 UNIT capsule     30 capsule    Take by mouth daily        * Notice:  This list has 2 medication(s) that are the same as other medications prescribed for you. Read the directions carefully, and ask your doctor or other care provider to review them with you.

## 2017-09-13 NOTE — TELEPHONE ENCOUNTER
Ok for chantix. rx sent. Can do flu vaccine as nurse visit. Please let know.    Thanks,  Halle De Los Santos MD  Internal Medicine/Pediatrics

## 2017-09-13 NOTE — NURSING NOTE
"Chief Complaint   Patient presents with     Recheck Medication       Initial BP 92/62 (BP Location: Right arm, Patient Position: Sitting, Cuff Size: Adult Regular)  Pulse 62  Temp 96.4  F (35.8  C) (Tympanic)  Ht 5' 0.5\" (1.537 m)  Wt 169 lb 3.2 oz (76.7 kg)  SpO2 97%  BMI 32.5 kg/m2 Estimated body mass index is 32.5 kg/(m^2) as calculated from the following:    Height as of this encounter: 5' 0.5\" (1.537 m).    Weight as of this encounter: 169 lb 3.2 oz (76.7 kg).  Medication Reconciliation: jonny Bowles      "

## 2017-09-13 NOTE — PATIENT INSTRUCTIONS
1. Try stopping the spironolactone - monitor for swelling  2. Talk with Dr. Beyer about who he recommends for orthopedics   3. You will be contacted to set up the the MRI of the brain and blood vessels  [facility] Radiology [phone]  4. Refilled senokot-S  5. If MRI ok, would recommend see sleep doctors to talk about excessive daytime sleepiness

## 2017-09-13 NOTE — PROGRESS NOTES
SUBJECTIVE:   Cristela Smith is a 52 year old female who presents to clinic today for the following health issues:  Patient here for f/u with multiple different issue:    1. Knee pain - has intermittent swelling of knees and legs, intermittent pain. Difficulty with bearing weight. Typically in the morning and at night when more tired. Dr. Beyer did MRI's of both knees that showed bursitis and arthritis and bilateral Baker's cyst. Told due to CRPS. Told that needs to have the baker's cysts removed due to CRPS. Dr. Beyer plans to start doing injections tomorrow. Will discuss with him recommendations for orthopedic surgeons who are familiar with CRPS.     2. Blood pressure concerns- has been running low lately. Systolic BP in the upper 70's frequently. Has been more fatigued and tired. Symptoms improve when BP higher. H/o hypertension. Now just taking spironolactone and this is more for swelling.    3. Tachycardia: Has pulse go up to 130 - has happened 3 times. In July, August and a week ago. Happens when at rest. Notices when takes pulse, no symptoms. Lasts for a few minutes. Not irregular. Having some chest tightness off and on, but not necessarily when HR elevated.    4. Fatigue: Still having extreme sleepiness. Typically will sleep on days has procedures, but not always related to the procedure. Will sleep for 12-14 hours at a time. Slurs words at times when this sleepy. Daughter flew back from [place] because thought she was using drugs. Patient adamant she is only taking what is prescribed by Dr. Beyer and more often taking much less than he prescribed. He has done blood tests for metabolism and says should not be due to the medication. Still happens even when doesn't take the medication at all. Memory is not as good.     5. Facial droop: Left side of face is drooping more than the other. Sister noticed first. Has been over last few months. Left side of body sometimes more weak. Uses a walker at time because has  a hard time moving around. Denies numbness in face. Has not improved over this time period. She is starting to get concerned that she may have had a stroke. Continues to get dizziness off and on. Falls frequently, but this has been the case since her fall, concussion.    6. Chronic pain/CRPS - continues with Dr. Beyer and having procedures every couple of weeks. Took a 3 week break and found things got worse with a longer break. Taking MS Contin 15 mg bid and oxycodone 10 mg every 4 hours as needed. Typically takes 3-6 pills/day of oxycodone. Previously on dilaudid and stopped this.     7. Anxiety/depression: took herself off of the trintillex. Using hydroxyzine once or twice a day. Takes 2 pills at a time. Still has clonazepam as well. Feels this is going well off of medications.    8. Constipation - continues to be a big problem for her. Has history of IBS that is constipation predominant as well as weak pelvic/rectal muscles that do not effectively push stool out. Narcotics make this worse. Taking movantik. Feels this helps some. Miralax doesn't work anymore. Has been using dulcolax and not helping. Trying to drink a lot of water. Gets IVF with procedures.  Doing multiple enemas. Ran out of Senokot-S. Thought that was helping when she was taking it.     Reviewed and updated as needed this visit by clinical staffTobacco  Allergies  Meds  Problems       Reviewed and updated as needed this visit by Provider  Allergies  Meds  Problems       -------------------------------------    ROS:  Constitutional, HEENT, cardiovascular, pulmonary, GI, , musculoskeletal, neuro, skin, endocrine and psych systems are negative, except as otherwise noted.    Problem list, Medication list, Allergies, and Medical/Social/Surgical histories reviewed in EPIC and updated as appropriate.    OBJECTIVE:                                                    BP 92/62 (BP Location: Right arm, Patient Position: Sitting, Cuff Size: Adult  "Regular)  Pulse 62  Temp 96.4  F (35.8  C) (Tympanic)  Ht 5' 0.5\" (1.537 m)  Wt 169 lb 3.2 oz (76.7 kg)  SpO2 97%  BMI 32.5 kg/m2   Body mass index is 32.5 kg/(m^2).  General Appearance: alert and no distress  Eyes:   no discharge, erythema.  Normal pupils. EOMI. Able to tightly close both eyes.  ENT: ear canals and TM's normal, and nose and mouth without ulcers or lesions  Respiratory: lungs clear to auscultation - no rales, rhonchi or wheezes.  Cardiovascular: regular rate and rhythm, normal S1 S2, no S3 or S4 and no murmur, click or rub.  No peripheral edema.  Skin: no rashes or lesions.  Well perfused and normal turgor.  Neurological: facial droop on left side of mouth most evident when smiling, some loss of nasolabial fold on left side. Able to raise eyebrows equally. Sensation somewhat less on left side, but able to feel. Normal strength. Reflexes 4/5 at knees and ankles, but no clonus.  Psychiatric: mentation appears normal and affect normal/bright.    Diagnostic Test Results:  none      ASSESSMENT/PLAN:                                                      (R29.810) Facial droop  (primary encounter diagnosis)  (R42) Dizziness  (R29.6) Falls frequently  Comment: facial droop on lower portion of face predominantly, which is more consistent with peripheral 7th nerve lesion; however, has not had any improved in 2-3 months and has loss of sensation in face and intermittent weakness on left side > right side. Difficult to know if weakness more due to chronic pain.   Plan:   - recommend brain MRI and MRA of head and neck to exclude central causes   - if positive, will need to consider aspirin or plavix (pt had rxn to aspirin as child will check with mother to see if she knows what this was)  - if negative, than likely due to [place] Palsy    (G47.19) Excessive daytime sleepiness  Comment: on multiple medications that could be causing this; however, patient reports symptoms even when holds the medications. Had " "lab work up with negative Lyme, ESR, TSH, CBC and CMP. Denies illicit drug use or any ingestions.  Plan:   - if MRI negative, will plan to have see sleep clinic and see if it is possibly she has DAVON or  hypersomnolence condition    (M25.561,  M25.562) Acute pain of both knees  Comment: MRI with bursitis, OA and murrell's cysts. Per Dr. Beyer needs the cysts removed due to CRPS  Plan:   - discussed baker's cysts typically not removed unless doing surgery for another reason  - will f/u with Dr. Beyer and ask for recommendation for orthopedic who is more familiar with CRPS    (K59.09) Other constipation  Comment: not controlled. Likely multi-factorial with history of IBS-C and narcotics. Also has anatomical considerations.  Plan: senna-docusate (SENOKOT-S;PERICOLACE) 8.6-50 MG        per tablet  - continue movantik - on max dose  - restart senokot S 2 pills twice a day  - continue enemas  - consider lactulose in future, vs pelvic floor referral vs trial of IBS medication which would likely need to be prescribed by GI (lubiprostone)    (G56.41) Complex regional pain syndrome type 2 of right upper extremity  Plan:   - now both in upper and lower extremities  - continue to follow with Dr. Beyer    (F32.1) Moderate major depression (H)  (F41.9) Anxiety  Comment: off daily medication, but doing ok. At risk for recurrence  Plan:   - continue hydroxyzine and clonazepam as needed    (R00.0) Tachycardia  Comment: 3 episodes, unclear what is going on with this  Plan:   - continue to monitor for now  - if continues, will need to consider holter as not having symptoms but I\"m not sure they are close enough together to catch on holter monitor at this point    Follow up with Provider - 1-2 months    A total of 45 minutes was spent with Cristela in clinic today, of which >50% was spent counseling or in coordination of care regarding facial droop, dizziness, frequent falls, excessive daytime sleepiness, bilateral knee pain, constipation, " CRPS/chronic pain, anxiety, depression and tachycardia.     [facility]

## 2017-09-13 NOTE — TELEPHONE ENCOUNTER
Patient left VM message.  She had office appointment today and forgot two items.  1. Flu shot-patient may schedule nurse only appointment for this.  2. She would like to try Chantix for smoking cessation.  She has stopped many of the medications that prevented her from using Chantix in the past.  Is wondering if Dr. De Los Santos recommends she wait awhile to do this with all the other changes made, or ok to try now.  Pharmacy alessandro'd up    Meg-When you call patient about recommendation for smoking cessation, please advise her to schedule a nurse only appointment for Flu shot.  DAVID Cassidy RN

## 2017-09-14 ENCOUNTER — TRANSFERRED RECORDS (OUTPATIENT)
Dept: HEALTH INFORMATION MANAGEMENT | Facility: CLINIC | Age: 52
End: 2017-09-14

## 2017-09-14 NOTE — TELEPHONE ENCOUNTER
Called patient and informed her that Rx was sent and also scheduled her for a nurse visit on 9/18 for a flu shot.

## 2017-09-18 ENCOUNTER — HOSPITAL ENCOUNTER (OUTPATIENT)
Dept: MRI IMAGING | Facility: CLINIC | Age: 52
End: 2017-09-18
Attending: INTERNAL MEDICINE
Payer: MEDICARE

## 2017-09-18 ENCOUNTER — HOSPITAL ENCOUNTER (OUTPATIENT)
Dept: MRI IMAGING | Facility: CLINIC | Age: 52
Discharge: HOME | End: 2017-09-18
Attending: INTERNAL MEDICINE | Admitting: INTERNAL MEDICINE
Payer: MEDICARE

## 2017-09-18 DIAGNOSIS — R42 DIZZINESS: ICD-10-CM

## 2017-09-18 DIAGNOSIS — R29.810 FACIAL DROOP: ICD-10-CM

## 2017-09-18 PROCEDURE — 70553 MRI BRAIN STEM W/O & W/DYE: CPT

## 2017-09-18 PROCEDURE — A9585 GADOBUTROL INJECTION: HCPCS | Performed by: RADIOLOGY

## 2017-09-18 PROCEDURE — 70549 MR ANGIOGRAPH NECK W/O&W/DYE: CPT

## 2017-09-18 PROCEDURE — 70544 MR ANGIOGRAPHY HEAD W/O DYE: CPT

## 2017-09-18 PROCEDURE — 25000128 H RX IP 250 OP 636: Performed by: RADIOLOGY

## 2017-09-18 RX ORDER — GADOBUTROL 604.72 MG/ML
10 INJECTION INTRAVENOUS ONCE
Status: COMPLETED | OUTPATIENT
Start: 2017-09-18 | End: 2017-09-18

## 2017-09-18 RX ADMIN — GADOBUTROL 10 ML: 604.72 INJECTION INTRAVENOUS at 07:46

## 2017-09-20 ENCOUNTER — TRANSFERRED RECORDS (OUTPATIENT)
Dept: HEALTH INFORMATION MANAGEMENT | Facility: CLINIC | Age: 52
End: 2017-09-20

## 2017-09-27 ENCOUNTER — TELEPHONE (OUTPATIENT)
Dept: PEDIATRICS | Facility: CLINIC | Age: 52
End: 2017-09-27

## 2017-09-27 DIAGNOSIS — E78.5 HYPERLIPIDEMIA LDL GOAL <130: ICD-10-CM

## 2017-09-28 RX ORDER — ATORVASTATIN CALCIUM 20 MG/1
20 TABLET, FILM COATED ORAL AT BEDTIME
Qty: 30 TABLET | Refills: 0 | Status: SHIPPED | OUTPATIENT
Start: 2017-09-28 | End: 2017-10-04

## 2017-09-28 NOTE — TELEPHONE ENCOUNTER
atorvastatin (LIPITOR) 20 MG tablet     Last Written Prescription Date: 10/3/2016  Last Fill Quantity: 90, # refills: 3  Last Office Visit with G, P or [facility] prescribing provider: 9/13/20147       Lab Results   Component Value Date    CHOL 169 04/02/2016     Lab Results   Component Value Date    HDL 60 04/02/2016     Lab Results   Component Value Date    LDL 71 04/02/2016     Lab Results   Component Value Date    TRIG 192 04/02/2016     Lab Results   Component Value Date    CHOLHDLRATIO 3.2 06/30/2015

## 2017-09-28 NOTE — TELEPHONE ENCOUNTER
Medication is being filled for 1 time refill only due to:  Patient needs labs fasting lipid.     Corinne Loomis RN

## 2017-09-29 DIAGNOSIS — F41.9 ANXIETY: ICD-10-CM

## 2017-09-29 RX ORDER — HYDROXYZINE HYDROCHLORIDE 50 MG/1
100 TABLET, FILM COATED ORAL EVERY 6 HOURS PRN
Qty: 180 TABLET | Refills: 5 | Status: SHIPPED | OUTPATIENT
Start: 2017-09-29 | End: 2018-05-09

## 2017-10-04 DIAGNOSIS — E78.5 HYPERLIPIDEMIA LDL GOAL <130: ICD-10-CM

## 2017-10-04 LAB
CHOLEST SERPL-MCNC: 141 MG/DL
HDLC SERPL-MCNC: 57 MG/DL
LDLC SERPL CALC-MCNC: 45 MG/DL
NONHDLC SERPL-MCNC: 84 MG/DL
TRIGL SERPL-MCNC: 195 MG/DL

## 2017-10-04 PROCEDURE — 80061 LIPID PANEL: CPT | Performed by: INTERNAL MEDICINE

## 2017-10-04 PROCEDURE — 36415 COLL VENOUS BLD VENIPUNCTURE: CPT | Performed by: INTERNAL MEDICINE

## 2017-10-04 RX ORDER — ATORVASTATIN CALCIUM 20 MG/1
20 TABLET, FILM COATED ORAL AT BEDTIME
Qty: 90 TABLET | Refills: 3 | Status: SHIPPED | OUTPATIENT
Start: 2017-10-04 | End: 2018-06-12

## 2017-10-04 NOTE — LETTER
[facility]  [address]  [place] [address]                  [phone]   October 4, 2017    Cristela Smith  [address] [address]      Dear Cristela,    Here is a summary of your recent test results:    Your cholesterol looks good. Your total cholesterol is 141 (normal is 200 or less). Your LDL or bad cholesterol is 45 (normal is less than 100). Your HDL or good cholesterol is 57 (normal is 50 or higher). I recommend continuing the atorvastatin. You should recheck your cholesterol in 1 year. I will send refills to your pharmacy.    Please let me know if you have any questions.    Your test results are enclosed.      Please contact me if you have any questions.           Thank you very much for choosing [facility]    Best regards,    Halle De Los Santos MD        Results for orders placed or performed in visit on 10/04/17   Lipid panel reflex to direct LDL   Result Value Ref Range    Cholesterol 141 <200 mg/dL    Triglycerides 195 (H) <150 mg/dL    HDL Cholesterol 57 >49 mg/dL    LDL Cholesterol Calculated 45 <100 mg/dL    Non HDL Cholesterol 84 <130 mg/dL

## 2017-10-05 ENCOUNTER — TELEPHONE (OUTPATIENT)
Dept: PEDIATRICS | Facility: CLINIC | Age: 52
End: 2017-10-05

## 2017-10-05 DIAGNOSIS — R30.0 DYSURIA: Primary | ICD-10-CM

## 2017-10-05 NOTE — TELEPHONE ENCOUNTER
"I do not recommend treating possible UTI's without a urine sample. I could see her tomorrow in my same day in the afternoon. Or if she is able to come in and leave the urine sample tonight or tomorrow morning, we could complete the visit as a phone visit around 11:15 or 12 pm. Order for ua pended.    I sent her a result letter with her lipids - \"Your cholesterol looks good. Your total cholesterol is 141 (normal is 200 or less). Your LDL or bad cholesterol is 45 (normal is less than 100). Your HDL or good cholesterol is 57 (normal is 50 or higher). I recommend continuing the atorvastatin. You should recheck your cholesterol in 1 year. I will send refills to your pharmacy.\"    Please let know    Thanks,  Halle De Los Santos MD  Internal Medicine/Pediatrics    "

## 2017-10-05 NOTE — TELEPHONE ENCOUNTER
Called the pt to advise of below.  Appt scheduled.  Advised if she is worse before then, she can always be seen at [facility].

## 2017-10-05 NOTE — TELEPHONE ENCOUNTER
Pt calls and leaves a message on the triage line.  She says she thinks she has a major bladder infection.  She has been trying to get a ride, but has not found one yet.  She says she is susceptible to these.  She is wondering if Dr. De Los Santos would send in an antibiotic and 2 diflucan for her.  She is taking orzo? Which has helped a little.  She is having urgency, burning, bladder feels full, low grade fever.  She says she has had this for 4 days and should have come in her sooner.      She is also wondering about her lipid panel.     Will forward to Dr. De Los Santos to see if she wants the pt to be seen.      Pt can be reached at [phone].

## 2017-10-19 DIAGNOSIS — F51.01 PRIMARY INSOMNIA: ICD-10-CM

## 2017-10-23 RX ORDER — TRAZODONE HYDROCHLORIDE 150 MG/1
TABLET ORAL
Qty: 30 TABLET | Refills: 1 | Status: SHIPPED | OUTPATIENT
Start: 2017-10-23 | End: 2017-12-12

## 2017-10-23 RX ORDER — TRAZODONE HYDROCHLORIDE 50 MG/1
TABLET, FILM COATED ORAL
Qty: 30 TABLET | Refills: 1 | Status: SHIPPED | OUTPATIENT
Start: 2017-10-23 | End: 2017-12-12

## 2017-10-23 NOTE — TELEPHONE ENCOUNTER
Ok to fill. Has been on trazodone for long time and tolerated.    Halle De Los Santos MD  Internal Medicine/Pediatrics

## 2017-10-23 NOTE — TELEPHONE ENCOUNTER
Routing to provider to review request for Trazodone for insomnia. Last OV of 9/13/17 has diagnosis of excessive daytime sleepiness potentially related to medications. OK to continue this medication?

## 2017-10-26 ENCOUNTER — TRANSFERRED RECORDS (OUTPATIENT)
Dept: HEALTH INFORMATION MANAGEMENT | Facility: CLINIC | Age: 52
End: 2017-10-26

## 2017-11-16 ENCOUNTER — TELEPHONE (OUTPATIENT)
Dept: PEDIATRICS | Facility: CLINIC | Age: 52
End: 2017-11-16

## 2017-11-16 DIAGNOSIS — J06.9 VIRAL URI WITH COUGH: ICD-10-CM

## 2017-11-16 DIAGNOSIS — R11.0 NAUSEA: ICD-10-CM

## 2017-11-16 RX ORDER — ALBUTEROL SULFATE 90 UG/1
2 AEROSOL, METERED RESPIRATORY (INHALATION) EVERY 6 HOURS PRN
Qty: 1 INHALER | Refills: 0 | Status: SHIPPED | OUTPATIENT
Start: 2017-11-16 | End: 2018-06-05

## 2017-11-16 RX ORDER — ONDANSETRON 4 MG/1
4 TABLET, ORALLY DISINTEGRATING ORAL EVERY 6 HOURS PRN
Qty: 20 TABLET | Refills: 0 | Status: SHIPPED | OUTPATIENT
Start: 2017-11-16 | End: 2018-05-09

## 2017-11-16 NOTE — TELEPHONE ENCOUNTER
DUPLICATE.     ondansetron (ZOFRAN ODT) 4 MG ODT tab WAS FILLED ON 11/16/2017, QTY 20 WITH 0 REFILLS.

## 2017-11-16 NOTE — TELEPHONE ENCOUNTER
Patient no-showed appointment yesterday. Will refill, but if no improvement by tomorrow, needs to come in and be seen. Will need to go to  or see a different provider. I do not have any appointments tomorrow.    Please let know.  Halle De Los Santos MD  Internal Medicine/Pediatrics

## 2017-11-16 NOTE — TELEPHONE ENCOUNTER
"Yesterday developed vomiting at 0100, and had emesis from 4699-2666.  Tried eating crackers and clear liquids.  Today developed emesis at 0100 again and stopped vomiting at 0500.    Patient reports that the bridge of her nose is painful.  Top of head is painful.  Post-nasal discharge.  Chest congestion  Cough and at times feels like she can't get a deep breath.  Inhalers are .  Fatigue-\"zero energy\"  Low-grade fever last week.  Body aches  Symptoms began five days ago after trip to [place].    Patient feels that she is too ill to come to the clinic with the vomiting.   Feels weak , but would come to office appointment if Dr. De Los Santos is able to see her.  Requesting refills on medications below if she doesn't come in for an appt    Ondansetron 4 mg   Patient with possible gastroenteritis-unable to find her medication at home     Last Written Prescription Date: 17  Last Fill Quantity: 20,  # refills: 0   Last Office Visit with [facility], [facility] or [facility] prescribing provider: 17    Albuterol inhaler   This was prescribed to treat a viral infection-unable to refill per protocol.      Last Written Prescription Date: 16  Last Fill Quantity: 1, # refills: 1    Last Office Visit with [facility], [facility] or [facility] prescribing provider:  17   Future Office Visit:       Date of Last Asthma Action Plan Letter:   There are no preventive care reminders to display for this patient.   Asthma Control Test: No flowsheet data found.    Date of Last Spirometry Test:   No results found for this or any previous visit.    DAVID Cassidy RN                                                "

## 2017-11-17 ENCOUNTER — OFFICE VISIT (OUTPATIENT)
Dept: PEDIATRICS | Facility: CLINIC | Age: 52
End: 2017-11-17
Payer: MEDICARE

## 2017-11-17 ENCOUNTER — RADIANT APPOINTMENT (OUTPATIENT)
Dept: GENERAL RADIOLOGY | Facility: CLINIC | Age: 52
End: 2017-11-17
Attending: PHYSICIAN ASSISTANT
Payer: MEDICARE

## 2017-11-17 ENCOUNTER — TELEPHONE (OUTPATIENT)
Dept: PEDIATRICS | Facility: CLINIC | Age: 52
End: 2017-11-17

## 2017-11-17 VITALS
BODY MASS INDEX: 31.64 KG/M2 | RESPIRATION RATE: 16 BRPM | HEIGHT: 61 IN | TEMPERATURE: 98.4 F | WEIGHT: 167.6 LBS | SYSTOLIC BLOOD PRESSURE: 88 MMHG | OXYGEN SATURATION: 94 % | HEART RATE: 84 BPM | DIASTOLIC BLOOD PRESSURE: 60 MMHG

## 2017-11-17 DIAGNOSIS — R53.82 CHRONIC FATIGUE: ICD-10-CM

## 2017-11-17 DIAGNOSIS — J45.909 MILD REACTIVE AIRWAYS DISEASE, UNSPECIFIED WHETHER PERSISTENT: ICD-10-CM

## 2017-11-17 DIAGNOSIS — J01.90 ACUTE SINUSITIS WITH SYMPTOMS > 10 DAYS: ICD-10-CM

## 2017-11-17 DIAGNOSIS — B37.31 CANDIDAL VULVOVAGINITIS: ICD-10-CM

## 2017-11-17 DIAGNOSIS — R53.82 CHRONIC FATIGUE: Primary | ICD-10-CM

## 2017-11-17 LAB
BASOPHILS # BLD AUTO: 0 10E9/L (ref 0–0.2)
BASOPHILS NFR BLD AUTO: 0.5 %
DIFFERENTIAL METHOD BLD: NORMAL
EOSINOPHIL # BLD AUTO: 0.5 10E9/L (ref 0–0.7)
EOSINOPHIL NFR BLD AUTO: 7.4 %
ERYTHROCYTE [DISTWIDTH] IN BLOOD BY AUTOMATED COUNT: 14.1 % (ref 10–15)
HCT VFR BLD AUTO: 40.1 % (ref 35–47)
HGB BLD-MCNC: 13.2 G/DL (ref 11.7–15.7)
LYMPHOCYTES # BLD AUTO: 2.7 10E9/L (ref 0.8–5.3)
LYMPHOCYTES NFR BLD AUTO: 41.3 %
MCH RBC QN AUTO: 29.5 PG (ref 26.5–33)
MCHC RBC AUTO-ENTMCNC: 32.9 G/DL (ref 31.5–36.5)
MCV RBC AUTO: 90 FL (ref 78–100)
MONOCYTES # BLD AUTO: 0.6 10E9/L (ref 0–1.3)
MONOCYTES NFR BLD AUTO: 9.7 %
NEUTROPHILS # BLD AUTO: 2.7 10E9/L (ref 1.6–8.3)
NEUTROPHILS NFR BLD AUTO: 41.1 %
PLATELET # BLD AUTO: 245 10E9/L (ref 150–450)
RBC # BLD AUTO: 4.48 10E12/L (ref 3.8–5.2)
WBC # BLD AUTO: 6.6 10E9/L (ref 4–11)

## 2017-11-17 PROCEDURE — 99214 OFFICE O/P EST MOD 30 MIN: CPT | Performed by: PHYSICIAN ASSISTANT

## 2017-11-17 PROCEDURE — 36415 COLL VENOUS BLD VENIPUNCTURE: CPT | Performed by: PHYSICIAN ASSISTANT

## 2017-11-17 PROCEDURE — 71020 XR CHEST 2 VW: CPT

## 2017-11-17 PROCEDURE — 85025 COMPLETE CBC W/AUTO DIFF WBC: CPT | Performed by: PHYSICIAN ASSISTANT

## 2017-11-17 RX ORDER — FLUCONAZOLE 150 MG/1
150 TABLET ORAL ONCE
Qty: 2 TABLET | Refills: 0 | Status: SHIPPED | OUTPATIENT
Start: 2017-11-17 | End: 2017-11-17

## 2017-11-17 RX ORDER — ALBUTEROL SULFATE 0.83 MG/ML
1 SOLUTION RESPIRATORY (INHALATION) EVERY 6 HOURS PRN
Qty: 25 VIAL | Refills: 1 | Status: SHIPPED | OUTPATIENT
Start: 2017-11-17 | End: 2018-05-09

## 2017-11-17 NOTE — NURSING NOTE
"Chief Complaint   Patient presents with     URI       Initial BP (!) 88/60 (BP Location: Right arm, Patient Position: Chair, Cuff Size: Adult Regular)  Pulse 84  Temp 98.4  F (36.9  C) (Oral)  Resp 16  Ht 5' 0.5\" (1.537 m)  Wt 167 lb 9.6 oz (76 kg)  SpO2 94%  BMI 32.19 kg/m2 Estimated body mass index is 32.19 kg/(m^2) as calculated from the following:    Height as of this encounter: 5' 0.5\" (1.537 m).    Weight as of this encounter: 167 lb 9.6 oz (76 kg).  Medication Reconciliation: complete   Joy Chapman MA      "

## 2017-11-17 NOTE — LETTER
My Depression Action Plan  Name: Cristela Smith   Date of Birth 1965  Date: 11/17/2017    My doctor: Halle De Los Santos   My clinic: [address]  [address]  [facility] [address] [phone]          GREEN    ZONE   Good Control    What it looks like:     Things are going generally well. You have normal up s and down s. You may even feel depressed from time to time, but bad moods usually last less than a day.   What you need to do:  1. Continue to care for yourself (see self care plan)  2. Check your depression survival kit and update it as needed  3. Follow your physician s recommendations including any medication.  4. Do not stop taking medication unless you consult with your physician first.           YELLOW         ZONE Getting Worse    What it looks like:     Depression is starting to interfere with your life.     It may be hard to get out of bed; you may be starting to isolate yourself from others.    Symptoms of depression are starting to last most all day and this has happened for several days.     You may have suicidal thoughts but they are not constant.   What you need to do:     1. Call your care team, your response to treatment will improve if you keep your care team informed of your progress. Yellow periods are signs an adjustment may need to be made.     2. Continue your self-care, even if you have to fake it!    3. Talk to someone in your support network    4. Open up your depression survival kit           RED    ZONE Medical Alert - Get Help    What it looks like:     Depression is seriously interfering with your life.     You may experience these or other symptoms: You can t get out of bed most days, can t work or engage in other necessary activities, you have trouble taking care of basic hygiene, or basic responsibilities, thoughts of suicide or death that will not go away, self-injurious behavior.     What you need to do:  1. Call  your care team and request a same-day appointment. If they are not available (weekends or after hours) call your local crisis line, emergency room or 911.      Electronically signed by: Joy Chapman, November 17, 2017    Depression Self Care Plan / Survival Kit    Self-Care for Depression  Here s the deal. Your body and mind are really not as separate as most people think.  What you do and think affects how you feel and how you feel influences what you do and think. This means if you do things that people who feel good do, it will help you feel better.  Sometimes this is all it takes.  There is also a place for medication and therapy depending on how severe your depression is, so be sure to consult with your medical provider and/ or Behavioral Health Consultant if your symptoms are worsening or not improving.     In order to better manage my stress, I will:    Exercise  Get some form of exercise, every day. This will help reduce pain and release endorphins, the  feel good  chemicals in your brain. This is almost as good as taking antidepressants!  This is not the same as joining a gym and then never going! (they count on that by the way ) It can be as simple as just going for a walk or doing some gardening, anything that will get you moving.      Hygiene   Maintain good hygiene (Get out of bed in the morning, Make your bed, Brush your teeth, Take a shower, and Get dressed like you were going to work, even if you are unemployed).  If your clothes don't fit try to get ones that do.    Diet  I will strive to eat foods that are good for me, drink plenty of water, and avoid excessive sugar, caffeine, alcohol, and other mood-altering substances.  Some foods that are helpful in depression are: complex carbohydrates, B vitamins, flaxseed, fish or fish oil, fresh fruits and vegetables.    Psychotherapy  I agree to participate in Individual Therapy (if recommended).    Medication  If prescribed medications, I agree to  take them.  Missing doses can result in serious side effects.  I understand that drinking alcohol, or other illicit drug use, may cause potential side effects.  I will not stop my medication abruptly without first discussing it with my provider.    Staying Connected With Others  I will stay in touch with my friends, family members, and my primary care provider/team.    Use your imagination  Be creative.  We all have a creative side; it doesn t matter if it s oil painting, sand castles, or mud pies! This will also kick up the endorphins.    Witness Beauty  (AKA stop and smell the roses) Take a look outside, even in mid-winter. Notice colors, textures. Watch the squirrels and birds.     Service to others  Be of service to others.  There is always someone else in need.  By helping others we can  get out of ourselves  and remember the really important things.  This also provides opportunities for practicing all the other parts of the program.    Humor  Laugh and be silly!  Adjust your TV habits for less news and crime-drama and more comedy.    Control your stress  Try breathing deep, massage therapy, biofeedback, and meditation. Find time to relax each day.     My support system    Clinic Contact:  Phone number:    Contact 1:  Phone number:    Contact 2:  Phone number:    Holiness/:  Phone number:    Therapist:  Phone number:    Local crisis center:    Phone number:    Other community support:  Phone number:

## 2017-11-17 NOTE — PATIENT INSTRUCTIONS
Augmentin for 10 days  Probiotic   Humidifier, increased fluids, REST!      With distilled water 2-3x per day for a minimum 2-3 days      Sinusitis (Antibiotic Treatment)    The sinuses are air-filled spaces within the bones of the face. They connect to the inside of the nose. Sinusitis is an inflammation of the tissue lining the sinus cavity. Sinus inflammation can occur during a cold. It can also be due to allergies to pollens and other particles in the air. Sinusitis can cause symptoms of sinus congestion and fullness. A sinus infection causes fever, headache and facial pain. There is often green or yellow drainage from the nose or into the back of the throat (post-nasal drip). You have been given antibiotics to treat this condition.  Home care:    Take the full course of antibiotics as instructed. Do not stop taking them, even if you feel better.    Drink plenty of water, hot tea, and other liquids. This may help thin mucus. It also may promote sinus drainage.    Heat may help soothe painful areas of the face. Use a towel soaked in hot water. Or,  the shower and direct the hot spray onto your face. Using a vaporizer along with a menthol rub at night may also help.     An expectorant containing guaifenesin may help thin the mucus and promote drainage from the sinuses.    Over-the-counter decongestants may be used unless a similar medicine was prescribed. Nasal sprays work the fastest. Use one that contains phenylephrine or oxymetazoline. First blow the nose gently. Then use the spray. Do not use these medicines more often than directed on the label or symptoms may get worse. You may also use tablets containing pseudoephedrine. Avoid products that combine ingredients, because side effects may be increased. Read labels. You can also ask the pharmacist for help. (NOTE: Persons with high blood pressure should not use decongestants. They can raise blood pressure.)    Over-the-counter antihistamines may help  if allergies contributed to your sinusitis.      Do not use nasal rinses or irrigation during an acute sinus infection, unless told to by your health care provider. Rinsing may spread the infection to other sinuses.    Use acetaminophen or ibuprofen to control pain, unless another pain medicine was prescribed. (If you have chronic liver or kidney disease or ever had a stomach ulcer, talk with your doctor before using these medicines. Aspirin should never be used in anyone under 18 years of age who is ill with a fever. It may cause severe liver damage.)    Don't smoke. This can worsen symptoms.  Follow-up care  Follow up with your healthcare provider or our staff if you are not improving within the next week.  When to seek medical advice  Call your healthcare provider if any of these occur:    Facial pain or headache becoming more severe    Stiff neck    Unusual drowsiness or confusion    Swelling of the forehead or eyelids    Vision problems, including blurred or double vision    Fever of 100.4 F (38 C) or higher, or as directed by your healthcare provider    Seizure    Breathing problems    Symptoms not resolving within 10 days  Date Last Reviewed: 4/13/2015 2000-2017 [employer]. [address] [place] [address]. All rights reserved. This information is not intended as a substitute for professional medical care. Always follow your healthcare professional's instructions.        Bronchitis, Antibiotic Treatment (Adult)    Bronchitis is an infection of the air passages (bronchial tubes) in your lungs. It often occurs when you have a cold. This illness is contagious during the first few days and is spread through the air by coughing and sneezing, or by direct contact (touching the sick person and then touching your own eyes, nose, or mouth).  Symptoms of bronchitis include cough with mucus (phlegm) and low-grade fever. Bronchitis usually lasts 7 to 14 days. Mild cases can be treated with  simple home remedies. More severe infection is treated with an antibiotic.  Home care  Follow these guidelines when caring for yourself at home:    If your symptoms are severe, rest at home for the first 2 to 3 days. When you go back to your usual activities, don't let yourself get too tired.    Do not smoke. Also avoid being exposed to secondhand smoke.    You may use over-the-counter medicines to control fever or pain, unless another medicine was prescribed. (Note: If you have chronic liver or kidney disease or have ever had a stomach ulcer or gastrointestinal bleeding, talk with your healthcare provider before using these medicines. Also talk to your provider if you are taking medicine to prevent blood clots.) Aspirin should never be given to anyone younger than 18 years of age who is ill with a viral infection or fever. It may cause severe liver or brain damage.    Your appetite may be poor, so a light diet is fine. Avoid dehydration by drinking 6 to 8 glasses of fluids per day (such as water, soft drinks, sports drinks, juices, tea, or soup). Extra fluids will help loosen secretions in the nose and lungs.    Over-the-counter cough, cold, and sore-throat medicines will not shorten the length of the illness, but they may be helpful to reduce symptoms. (Note: Do not use decongestants if you have high blood pressure.)    Finish all antibiotic medicine. Do this even if you are feeling better after only a few days.  Follow-up care  Follow up with your healthcare provider, or as advised. If you had an X-ray or ECG (electrocardiogram), a specialist will review it. You will be notified of any new findings that may affect your care.  Note: If you are age 65 or older, or if you have a chronic lung disease or condition that affects your immune system, or you smoke, talk to your healthcare provider about having pneumococcal vaccinations and a yearly influenza vaccination (flu shot).  When to seek medical advice  Call your  healthcare provider right away if any of these occur:    Fever of 100.4 F (38 C) or higher    Coughing up increased amounts of colored sputum    Weakness, drowsiness, headache, facial pain, ear pain, or a stiff neck  Call 911, or get immediate medical care  Contact emergency services right away if any of these occur.    Coughing up blood    Worsening weakness, drowsiness, headache, or stiff neck    Trouble breathing, wheezing, or pain with breathing  Date Last Reviewed: 9/13/2015 2000-2017 [employer]. [address] [place] [address]. All rights reserved. This information is not intended as a substitute for professional medical care. Always follow your healthcare professional's instructions.

## 2017-11-17 NOTE — MR AVS SNAPSHOT
After Visit Summary   11/17/2017    Cristela Smith    MRN: [id number]           Patient Information     Date Of Birth          1965        Visit Information        Provider Department      11/17/2017 3:20 PM Gaetano Quiroz PA-C [facility]        Today's Diagnoses     Chronic fatigue    -  1    Acute sinusitis with symptoms > 10 days        Candidal vulvovaginitis        Mild reactive airways disease, unspecified whether persistent          Care Instructions      Augmentin for 10 days  Probiotic   Humidifier, increased fluids, REST!      With distilled water 2-3x per day for a minimum 2-3 days      Sinusitis (Antibiotic Treatment)    The sinuses are air-filled spaces within the bones of the face. They connect to the inside of the nose. Sinusitis is an inflammation of the tissue lining the sinus cavity. Sinus inflammation can occur during a cold. It can also be due to allergies to pollens and other particles in the air. Sinusitis can cause symptoms of sinus congestion and fullness. A sinus infection causes fever, headache and facial pain. There is often green or yellow drainage from the nose or into the back of the throat (post-nasal drip). You have been given antibiotics to treat this condition.  Home care:    Take the full course of antibiotics as instructed. Do not stop taking them, even if you feel better.    Drink plenty of water, hot tea, and other liquids. This may help thin mucus. It also may promote sinus drainage.    Heat may help soothe painful areas of the face. Use a towel soaked in hot water. Or,  the shower and direct the hot spray onto your face. Using a vaporizer along with a menthol rub at night may also help.     An expectorant containing guaifenesin may help thin the mucus and promote drainage from the sinuses.    Over-the-counter decongestants may be used unless a similar medicine was prescribed. Nasal sprays work the fastest. Use one that  contains phenylephrine or oxymetazoline. First blow the nose gently. Then use the spray. Do not use these medicines more often than directed on the label or symptoms may get worse. You may also use tablets containing pseudoephedrine. Avoid products that combine ingredients, because side effects may be increased. Read labels. You can also ask the pharmacist for help. (NOTE: Persons with high blood pressure should not use decongestants. They can raise blood pressure.)    Over-the-counter antihistamines may help if allergies contributed to your sinusitis.      Do not use nasal rinses or irrigation during an acute sinus infection, unless told to by your health care provider. Rinsing may spread the infection to other sinuses.    Use acetaminophen or ibuprofen to control pain, unless another pain medicine was prescribed. (If you have chronic liver or kidney disease or ever had a stomach ulcer, talk with your doctor before using these medicines. Aspirin should never be used in anyone under 18 years of age who is ill with a fever. It may cause severe liver damage.)    Don't smoke. This can worsen symptoms.  Follow-up care  Follow up with your healthcare provider or our staff if you are not improving within the next week.  When to seek medical advice  Call your healthcare provider if any of these occur:    Facial pain or headache becoming more severe    Stiff neck    Unusual drowsiness or confusion    Swelling of the forehead or eyelids    Vision problems, including blurred or double vision    Fever of 100.4 F (38 C) or higher, or as directed by your healthcare provider    Seizure    Breathing problems    Symptoms not resolving within 10 days  Date Last Reviewed: 4/13/2015 2000-2017 [employer]. [address] [place] [address], [place] [address]. All rights reserved. This information is not intended as a substitute for professional medical care. Always follow your healthcare professional's  instructions.        Bronchitis, Antibiotic Treatment (Adult)    Bronchitis is an infection of the air passages (bronchial tubes) in your lungs. It often occurs when you have a cold. This illness is contagious during the first few days and is spread through the air by coughing and sneezing, or by direct contact (touching the sick person and then touching your own eyes, nose, or mouth).  Symptoms of bronchitis include cough with mucus (phlegm) and low-grade fever. Bronchitis usually lasts 7 to 14 days. Mild cases can be treated with simple home remedies. More severe infection is treated with an antibiotic.  Home care  Follow these guidelines when caring for yourself at home:    If your symptoms are severe, rest at home for the first 2 to 3 days. When you go back to your usual activities, don't let yourself get too tired.    Do not smoke. Also avoid being exposed to secondhand smoke.    You may use over-the-counter medicines to control fever or pain, unless another medicine was prescribed. (Note: If you have chronic liver or kidney disease or have ever had a stomach ulcer or gastrointestinal bleeding, talk with your healthcare provider before using these medicines. Also talk to your provider if you are taking medicine to prevent blood clots.) Aspirin should never be given to anyone younger than 18 years of age who is ill with a viral infection or fever. It may cause severe liver or brain damage.    Your appetite may be poor, so a light diet is fine. Avoid dehydration by drinking 6 to 8 glasses of fluids per day (such as water, soft drinks, sports drinks, juices, tea, or soup). Extra fluids will help loosen secretions in the nose and lungs.    Over-the-counter cough, cold, and sore-throat medicines will not shorten the length of the illness, but they may be helpful to reduce symptoms. (Note: Do not use decongestants if you have high blood pressure.)    Finish all antibiotic medicine. Do this even if you are feeling  better after only a few days.  Follow-up care  Follow up with your healthcare provider, or as advised. If you had an X-ray or ECG (electrocardiogram), a specialist will review it. You will be notified of any new findings that may affect your care.  Note: If you are age 65 or older, or if you have a chronic lung disease or condition that affects your immune system, or you smoke, talk to your healthcare provider about having pneumococcal vaccinations and a yearly influenza vaccination (flu shot).  When to seek medical advice  Call your healthcare provider right away if any of these occur:    Fever of 100.4 F (38 C) or higher    Coughing up increased amounts of colored sputum    Weakness, drowsiness, headache, facial pain, ear pain, or a stiff neck  Call 911, or get immediate medical care  Contact emergency services right away if any of these occur.    Coughing up blood    Worsening weakness, drowsiness, headache, or stiff neck    Trouble breathing, wheezing, or pain with breathing  Date Last Reviewed: 9/13/2015 2000-2017 [employer]. [address] [place] [address]. All rights reserved. This information is not intended as a substitute for professional medical care. Always follow your healthcare professional's instructions.                Follow-ups after your visit        Who to contact     If you have questions or need follow up information about today's clinic visit or your schedule please [facility] directly at [phone].  Normal or non-critical lab and imaging results will be communicated to you by MyChart, letter or phone within 4 business days after the clinic has received the results. If you do not hear from us within 7 days, please contact the clinic through MyChart or phone. If you have a critical or abnormal lab result, we will notify you by phone as soon as possible.  Submit refill requests through [employer] or call your pharmacy and they will forward the  "refill request to us. Please allow 3 business days for your refill to be completed.          Additional Information About Your Visit        MyChart Information     [employer] lets you send messages to your doctor, view your test results, renew your prescriptions, schedule appointments and more. To sign up, go to [email or web address] . Click on \"Log in\" on the left side of the screen, which will take you to the Welcome page. Then click on \"Sign up Now\" on the right side of the page.     You will be asked to enter the access code listed below, as well as some personal information. Please follow the directions to create your username and password.     Your access code is: [email or web address]  Expires: 2018  9:14 AM     Your access code will  in 90 days. If you need help or a new code, please call your [facility] clinic or [phone].        Care EveryWhere ID     This is your Care EveryWhere ID. This could be used by other organizations to access your [facility] medical records  [id number]        Your Vitals Were     Pulse Temperature Respirations Height Pulse Oximetry BMI (Body Mass Index)    84 98.4  F (36.9  C) (Oral) 16 5' 0.5\" (1.537 m) 94% 32.19 kg/m2       Blood Pressure from Last 3 Encounters:   17 (!) 88/60   17 92/62   17 98/60    Weight from Last 3 Encounters:   17 167 lb 9.6 oz (76 kg)   17 169 lb 3.2 oz (76.7 kg)   17 173 lb 11.2 oz (78.8 kg)              We Performed the Following     CBC with platelets and differential          Today's Medication Changes          These changes are accurate as of: 17  4:34 PM.  If you have any questions, ask your nurse or doctor.               Start taking these medicines.        Dose/Directions    amoxicillin-clavulanate 875-125 MG per tablet   Commonly known as:  AUGMENTIN   Used for:  Acute sinusitis with symptoms > 10 days   Started by:  Gaetano Quiroz PA-C        Dose:  1 tablet   Take 1 tablet by mouth 2 " times daily for 10 days   Quantity:  20 tablet   Refills:  0       fluconazole 150 MG tablet   Commonly known as:  DIFLUCAN   Used for:  Candidal vulvovaginitis   Started by:  Gaetano Quiroz PA-C        Dose:  150 mg   Take 1 tablet (150 mg) by mouth once for 1 dose   Quantity:  2 tablet   Refills:  0         These medicines have changed or have updated prescriptions.        Dose/Directions    * albuterol 108 (90 BASE) MCG/ACT Inhaler   Commonly known as:  PROAIR HFA/PROVENTIL HFA/VENTOLIN HFA   This may have changed:  Another medication with the same name was added. Make sure you understand how and when to take each.   Used for:  Viral URI with cough   Changed by:  Halle De Los Santos MD        Dose:  2 puff   Inhale 2 puffs into the lungs every 6 hours as needed for shortness of breath / dyspnea or wheezing   Quantity:  1 Inhaler   Refills:  0       * albuterol (2.5 MG/3ML) 0.083% neb solution   This may have changed:  You were already taking a medication with the same name, and this prescription was added. Make sure you understand how and when to take each.   Used for:  Mild reactive airways disease, unspecified whether persistent   Changed by:  Gaetano Quiroz PA-C        Dose:  1 vial   Take 1 vial (2.5 mg) by nebulization every 6 hours as needed for shortness of breath / dyspnea or wheezing   Quantity:  25 vial   Refills:  1       * Notice:  This list has 2 medication(s) that are the same as other medications prescribed for you. Read the directions carefully, and ask your doctor or other care provider to review them with you.         Where to get your medicines      These medications were sent to [id number] - [place], [place] - [address]  [address] [address]     Phone:  [phone]     albuterol (2.5 MG/3ML) 0.083% neb solution    amoxicillin-clavulanate 875-125 MG per tablet    fluconazole 150 MG tablet                Primary Care Provider Office Phone  # Fax #    Halle De Los Santos [phone] [address] [place] [address]        Equal Access to Services     STACI SUAREZ : Haddaniela aad ku hademeliaby Johnali, wasidneyda cindymayelin, qanoemita karebeka lopez, daniel st laportilloponcho walter. So [facility] [phone].    ATENCIÓN: Si habla español, tiene a [place] disposición servicios gratuitos de asistencia lingüística. Llame al [phone].    We comply with applicable federal civil rights laws and [place] laws. We do not discriminate on the basis of race, color, national origin, age, disability, sex, sexual orientation, or gender identity.            Thank you!     Thank you for choosing [facility]  for your care. Our goal is always to provide you with excellent care. Hearing back from our patients is one way we can continue to improve our services. Please take a few minutes to complete the written survey that you may receive in the mail after your visit with us. Thank you!             Your Updated Medication List - Protect others around you: Learn how to safely use, store and throw away your medicines at www.disposemymeds.org.          This list is accurate as of: 11/17/17  4:34 PM.  Always use your most recent med list.                   Brand Name Dispense Instructions for use Diagnosis    * albuterol 108 (90 BASE) MCG/ACT Inhaler    PROAIR HFA/PROVENTIL HFA/VENTOLIN HFA    1 Inhaler    Inhale 2 puffs into the lungs every 6 hours as needed for shortness of breath / dyspnea or wheezing    Viral URI with cough       * albuterol (2.5 MG/3ML) 0.083% neb solution     25 vial    Take 1 vial (2.5 mg) by nebulization every 6 hours as needed for shortness of breath / dyspnea or wheezing    Mild reactive airways disease, unspecified whether persistent       amoxicillin-clavulanate 875-125 MG per tablet    AUGMENTIN    20 tablet    Take 1 tablet by mouth 2 times daily for 10 days    Acute sinusitis with symptoms > 10 days        atorvastatin 20 MG tablet    LIPITOR    90 tablet    Take 1 tablet (20 mg) by mouth At Bedtime    Hyperlipidemia LDL goal <130       butalbital-acetaminophen-caffeine -40 MG per tablet    FIORICET/ESGIC     Take 1-2 tablets by mouth 4 times daily as needed        clonazePAM 0.5 MG tablet    klonoPIN     Take 0.5 mg by mouth 2 times daily as needed for anxiety        cyabnocobalamin 2500 MCG sublingual tablet    VITAMIN B-12    90 tablet    Place 2,500 mcg under the tongue daily    Vitamin B12 deficiency (non anemic)       estropipate 1.5 MG tablet    OGEN    90 tablet    Take 1 tablet by mouth once daily.    Menopausal syndrome (hot flashes)       fluconazole 150 MG tablet    DIFLUCAN    2 tablet    Take 1 tablet (150 mg) by mouth once for 1 dose    Candidal vulvovaginitis       fluticasone 50 MCG/ACT spray    FLONASE    1 Bottle    Spray 1-2 sprays into both nostrils daily as needed for rhinitis or allergies    Other chronic rhinitis       hydrOXYzine 50 MG tablet    ATARAX    180 tablet    Take 2 tablets (100 mg) by mouth every 6 hours as needed for anxiety    Anxiety       hyoscyamine 0.125 MG tablet    ANASPAZ/LEVSIN    40 tablet    Take 1-2 tablets (125-250 mcg) by mouth every 4 hours as needed for cramping    Irritable bowel syndrome without diarrhea       LYRICA 100 MG capsule   Generic drug:  pregabalin     270 capsule    Take 1 capsule (100 mg) by mouth daily And 2 capsules (200 mg) at bedtime        MOBIC 15 MG tablet   Generic drug:  meloxicam     30 tablet    Take 1 tablet (15 mg) by mouth daily        MS CONTIN 15 MG 12 hr tablet   Generic drug:  morphine      Take 15 mg by mouth every 12 hours        naloxegol 25 MG Tabs tablet     30 tablet    Take 1 tablet (25 mg) by mouth every morning (before breakfast)    Drug-induced constipation       Omega-3 Krill Oil 300 MG Caps      Take 300 mg by mouth daily        ondansetron 4 MG ODT tab    ZOFRAN ODT    20 tablet    Take 1 tablet (4 mg) by mouth  every 6 hours as needed for nausea or vomiting    Nausea       oxyCODONE IR 5 MG tablet    ROXICODONE    18 tablet    Take 2 tablets (10 mg) by mouth every 4 hours as needed for pain        senna-docusate 8.6-50 MG per tablet    SENOKOT-S;PERICOLACE    120 tablet    Take 2 tablets by mouth 2 times daily    Other constipation       * traZODone 150 MG tablet    DESYREL    30 tablet    take 1 tablet by mouth daily  at bedtime    Primary insomnia       * traZODone 50 MG tablet    DESYREL    30 tablet    Take 1 tablet (50mg) by mouth nightly with 150 mg tablets as needed for sleep for a total of 200mg.    Primary insomnia       * varenicline 0.5 MG X 11 & 1 MG X 42 tablet    CHANTIX STARTING MONTH FILIBERTO    53 tablet    Take 0.5 mg tab daily for 3 days, then 0.5 mg tab twice daily for 4 days, then 1 mg twice daily.    Tobacco abuse       * varenicline 1 MG tablet    CHANTIX    56 tablet    Take 1 tablet (1 mg) by mouth 2 times daily    Tobacco abuse       vitamin D 2000 UNITS tablet     100 tablet    Take 2,000 Units by mouth daily        vitamin E 400 UNIT capsule     30 capsule    Take by mouth daily        * Notice:  This list has 6 medication(s) that are the same as other medications prescribed for you. Read the directions carefully, and ask your doctor or other care provider to review them with you.

## 2017-11-17 NOTE — PROGRESS NOTES
"  SUBJECTIVE:   Cristela Smith is a 52 year old female who presents to clinic today for the following health issues:      Acute Illness   Acute illness concerns: sinus and cough  Onset: Over 1 week     Fever: YES- low grade fever, highest temp was 102     Chills/Sweats: YES- chills and sweats comes and goes     Headache (location?): YES- little    Sinus Pressure:YES    Conjunctivitis:  YES: bilateral-watery    Ear Pain: YES: right    Rhinorrhea: YES- post nasal    Congestion: YES- chest and nasal congestion     Sore Throat: YES- little     Cough: YES-non-productive-little    Wheeze: YES    Decreased Appetite: YES    Nausea: YES    Vomiting: YES- past 2 nights     Diarrhea:  no     Dysuria/Freq.: no     Fatigue/Achiness: YES- fatigue and muscle aches     Sick/Strep Exposure: no      Therapies Tried and outcome: Albuterol Inhaler, regular pain medication and CRPS     Patient would also like a CBC drawn to check for anemia.     Flu shot at [facility] in August    CRPS procedures now every three weeks rather than weekly.  Effecting Blood Pressure     ROS:  10 point ROS negative except as listed above      OBJECTIVE:     BP (!) 88/60 (BP Location: Right arm, Patient Position: Chair, Cuff Size: Adult Regular)  Pulse 84  Temp 98.4  F (36.9  C) (Oral)  Resp 16  Ht 5' 0.5\" (1.537 m)  Wt 167 lb 9.6 oz (76 kg)  SpO2 94%  BMI 32.19 kg/m2  Body mass index is 32.19 kg/(m^2).  GENERAL: healthy, alert and no distress  EYES: Eyes grossly normal to inspection, PERRL and conjunctivae and sclerae normal  HENT: ear canals and TM's normal, nose and mouth without ulcers or lesions  NECK: no adenopathy, no asymmetry, masses, or scars and thyroid normal to palpation  RESP: lungs clear to auscultation - no rales, rhonchi or wheezes  CV: regular rate and rhythm, normal S1 S2, no S3 or S4, no murmur, click or rub, no peripheral edema and peripheral pulses strong  ABDOMEN: soft, nontender, no hepatosplenomegaly, no masses and bowel sounds " normal  MS: no gross musculoskeletal defects noted, no edema    Results for orders placed or performed in visit on 11/17/17   CBC with platelets and differential   Result Value Ref Range    WBC 6.6 4.0 - 11.0 10e9/L    RBC Count 4.48 3.8 - 5.2 10e12/L    Hemoglobin 13.2 11.7 - 15.7 g/dL    Hematocrit 40.1 35.0 - 47.0 %    MCV 90 78 - 100 fl    MCH 29.5 26.5 - 33.0 pg    MCHC 32.9 31.5 - 36.5 g/dL    RDW 14.1 10.0 - 15.0 %    Platelet Count 245 150 - 450 10e9/L    Diff Method Automated Method     % Neutrophils 41.1 %    % Lymphocytes 41.3 %    % Monocytes 9.7 %    % Eosinophils 7.4 %    % Basophils 0.5 %    Absolute Neutrophil 2.7 1.6 - 8.3 10e9/L    Absolute Lymphocytes 2.7 0.8 - 5.3 10e9/L    Absolute Monocytes 0.6 0.0 - 1.3 10e9/L    Absolute Eosinophils 0.5 0.0 - 0.7 10e9/L    Absolute Basophils 0.0 0.0 - 0.2 10e9/L         ASSESSMENT/PLAN:     (R53.82) Chronic fatigue  (primary encounter diagnosis)  Plan: CBC with platelets and differential, XR Chest 2        Views      (J01.90) Acute sinusitis with symptoms > 10 days  Plan: amoxicillin-clavulanate (AUGMENTIN) 875-125 MG         per tablet       (B37.3) Candidal vulvovaginitis  Comment: Frequent result of antibiotic use  Plan: fluconazole (DIFLUCAN) 150 MG tablet       (J45.909) Mild reactive airways disease, unspecified whether persistent  Comment: Chronic complaint  Plan: albuterol (2.5 MG/3ML) 0.083% neb solution          Gaetano Quiroz PA-C  [facility]d

## 2017-11-17 NOTE — TELEPHONE ENCOUNTER
Patient calling that she was so tired that she slept through alarm and into the afternoon. Appointment rescheduled.   Karoline Gil RN

## 2017-11-21 RX ORDER — ONDANSETRON 4 MG/1
TABLET, ORALLY DISINTEGRATING ORAL
Qty: 20 TABLET | Refills: 0 | OUTPATIENT
Start: 2017-11-21

## 2017-11-22 ENCOUNTER — TELEPHONE (OUTPATIENT)
Dept: PEDIATRICS | Facility: CLINIC | Age: 52
End: 2017-11-22

## 2017-11-22 DIAGNOSIS — J01.90 ACUTE SINUSITIS WITH SYMPTOMS > 10 DAYS: Primary | ICD-10-CM

## 2017-11-27 ENCOUNTER — OFFICE VISIT (OUTPATIENT)
Dept: PEDIATRICS | Facility: CLINIC | Age: 52
End: 2017-11-27
Payer: MEDICARE

## 2017-11-27 VITALS
BODY MASS INDEX: 31.89 KG/M2 | SYSTOLIC BLOOD PRESSURE: 94 MMHG | HEART RATE: 71 BPM | DIASTOLIC BLOOD PRESSURE: 60 MMHG | WEIGHT: 166 LBS | OXYGEN SATURATION: 94 % | TEMPERATURE: 98.7 F

## 2017-11-27 DIAGNOSIS — R53.83 OTHER FATIGUE: Primary | ICD-10-CM

## 2017-11-27 PROCEDURE — 99214 OFFICE O/P EST MOD 30 MIN: CPT | Performed by: INTERNAL MEDICINE

## 2017-11-27 PROCEDURE — 36415 COLL VENOUS BLD VENIPUNCTURE: CPT | Performed by: INTERNAL MEDICINE

## 2017-11-27 PROCEDURE — 80053 COMPREHEN METABOLIC PANEL: CPT | Performed by: INTERNAL MEDICINE

## 2017-11-27 NOTE — MR AVS SNAPSHOT
"              After Visit Summary   2017    Cristela Smith    MRN: [id number]           Patient Information     Date Of Birth          1965        Visit Information        Provider Department      2017 2:00 PM Kyler James MD [facility]        Care Instructions    INSTRUCTIONS FOR TODAY:     additional labwork today   follow-up visit in 2 weeks     Dr James            Follow-ups after your visit        Who to contact     If you have questions or need follow up information about today's clinic visit or your schedule please contact [facility] directly at [phone].  Normal or non-critical lab and imaging results will be communicated to you by [facility]MGhart, letter or phone within 4 business days after the clinic has received the results. If you do not hear from us within 7 days, please contact the clinic through [facility]MGhart or phone. If you have a critical or abnormal lab result, we will notify you by phone as soon as possible.  Submit refill requests through [employer] or call your pharmacy and they will forward the refill request to us. Please allow 3 business days for your refill to be completed.          Additional Information About Your Visit        MyChart Information     [employer] lets you send messages to your doctor, view your test results, renew your prescriptions, schedule appointments and more. To sign up, go to [email or web address] . Click on \"Log in\" on the left side of the screen, which will take you to the Welcome page. Then click on \"Sign up Now\" on the right side of the page.     You will be asked to enter the access code listed below, as well as some personal information. Please follow the directions to create your username and password.     Your access code is: [email or web address]  Expires: 2018  9:14 AM     Your access code will  in 90 days. If you need help or a new code, please call your [facility] or [phone].        Care EveryWhere ID     " This is your Care EveryWhere ID. This could be used by other organizations to access your [place] medical records  [id number]        Your Vitals Were     Pulse Temperature Pulse Oximetry BMI (Body Mass Index)          71 98.7  F (37.1  C) (Oral) 94% 31.89 kg/m2         Blood Pressure from Last 3 Encounters:   11/27/17 94/60   11/17/17 (!) 88/60   09/13/17 92/62    Weight from Last 3 Encounters:   11/27/17 166 lb (75.3 kg)   11/17/17 167 lb 9.6 oz (76 kg)   09/13/17 169 lb 3.2 oz (76.7 kg)              Today, you had the following     No orders found for display         Today's Medication Changes          These changes are accurate as of: 11/27/17  2:41 PM.  If you have any questions, ask your nurse or doctor.               These medicines have changed or have updated prescriptions.        Dose/Directions    varenicline 0.5 MG X 11 & 1 MG X 42 tablet   Commonly known as:  CHANTIX STARTING MONTH PAK   This may have changed:  Another medication with the same name was removed. Continue taking this medication, and follow the directions you see here.   Used for:  Tobacco abuse   Changed by:  Halle De Los Santos MD        Take 0.5 mg tab daily for 3 days, then 0.5 mg tab twice daily for 4 days, then 1 mg twice daily.   Quantity:  53 tablet   Refills:  0         Stop taking these medicines if you haven't already. Please contact your care team if you have questions.     amoxicillin-clavulanate 875-125 MG per tablet   Commonly known as:  AUGMENTIN   Stopped by:  Kyler James MD                    Primary Care Provider Office Phone # Fax #    Halle De Los Santos [phone] [address] [place] [address]        Equal Access to Services     [facility] AH: Hadii ninoska akhtar Sogerard, waaxda luqadaha, qaybta kaalmada adeegyada, daniel watson. So [facility] [phone].    ATENCIÓN: Si habla español, tiene a [place] disposición servicios gratuitos de  asistencia lingüística. Rosa al [phone].    We comply with applicable federal civil rights laws and [place] laws. We do not discriminate on the basis of race, color, national origin, age, disability, sex, sexual orientation, or gender identity.            Thank you!     Thank you for choosing [facility]  for your care. Our goal is always to provide you with excellent care. Hearing back from our patients is one way we can continue to improve our services. Please take a few minutes to complete the written survey that you may receive in the mail after your visit with us. Thank you!             Your Updated Medication List - Protect others around you: Learn how to safely use, store and throw away your medicines at www.disposemymeds.org.          This list is accurate as of: 11/27/17  2:41 PM.  Always use your most recent med list.                   Brand Name Dispense Instructions for use Diagnosis    * albuterol 108 (90 BASE) MCG/ACT Inhaler    PROAIR HFA/PROVENTIL HFA/VENTOLIN HFA    1 Inhaler    Inhale 2 puffs into the lungs every 6 hours as needed for shortness of breath / dyspnea or wheezing    Viral URI with cough       * albuterol (2.5 MG/3ML) 0.083% neb solution     25 vial    Take 1 vial (2.5 mg) by nebulization every 6 hours as needed for shortness of breath / dyspnea or wheezing    Mild reactive airways disease, unspecified whether persistent       atorvastatin 20 MG tablet    LIPITOR    90 tablet    Take 1 tablet (20 mg) by mouth At Bedtime    Hyperlipidemia LDL goal <130       butalbital-acetaminophen-caffeine -40 MG per tablet    FIORICET/ESGIC     Take 1-2 tablets by mouth 4 times daily as needed        clonazePAM 0.5 MG tablet    klonoPIN     Take 0.5 mg by mouth 2 times daily as needed for anxiety        cyabnocobalamin 2500 MCG sublingual tablet    VITAMIN B-12    90 tablet    Place 2,500 mcg under the tongue daily    Vitamin B12 deficiency (non anemic)       estropipate 1.5  MG tablet    OGEN    90 tablet    Take 1 tablet by mouth once daily.    Menopausal syndrome (hot flashes)       fluticasone 50 MCG/ACT spray    FLONASE    1 Bottle    Spray 1-2 sprays into both nostrils daily as needed for rhinitis or allergies    Other chronic rhinitis       hydrOXYzine 50 MG tablet    ATARAX    180 tablet    Take 2 tablets (100 mg) by mouth every 6 hours as needed for anxiety    Anxiety       hyoscyamine 0.125 MG tablet    ANASPAZ/LEVSIN    40 tablet    Take 1-2 tablets (125-250 mcg) by mouth every 4 hours as needed for cramping    Irritable bowel syndrome without diarrhea       LYRICA 100 MG capsule   Generic drug:  pregabalin     270 capsule    Take 1 capsule (100 mg) by mouth daily And 2 capsules (200 mg) at bedtime        MOBIC 15 MG tablet   Generic drug:  meloxicam     30 tablet    Take 1 tablet (15 mg) by mouth daily        MS CONTIN 15 MG 12 hr tablet   Generic drug:  morphine      Take 15 mg by mouth every 12 hours        naloxegol 25 MG Tabs tablet     30 tablet    Take 1 tablet (25 mg) by mouth every morning (before breakfast)    Drug-induced constipation       Omega-3 Krill Oil 300 MG Caps      Take 300 mg by mouth daily        ondansetron 4 MG ODT tab    ZOFRAN ODT    20 tablet    Take 1 tablet (4 mg) by mouth every 6 hours as needed for nausea or vomiting    Nausea       oxyCODONE IR 5 MG tablet    ROXICODONE    18 tablet    Take 2 tablets (10 mg) by mouth every 4 hours as needed for pain        senna-docusate 8.6-50 MG per tablet    SENOKOT-S;PERICOLACE    120 tablet    Take 2 tablets by mouth 2 times daily    Other constipation       * traZODone 150 MG tablet    DESYREL    30 tablet    take 1 tablet by mouth daily  at bedtime    Primary insomnia       * traZODone 50 MG tablet    DESYREL    30 tablet    Take 1 tablet (50mg) by mouth nightly with 150 mg tablets as needed for sleep for a total of 200mg.    Primary insomnia       varenicline 0.5 MG X 11 & 1 MG X 42 tablet    CHANTIX  STARTING MONTH FILIBERTO    53 tablet    Take 0.5 mg tab daily for 3 days, then 0.5 mg tab twice daily for 4 days, then 1 mg twice daily.    Tobacco abuse       vitamin D 2000 UNITS tablet     100 tablet    Take 2,000 Units by mouth daily        vitamin E 400 UNIT capsule     30 capsule    Take by mouth daily        * Notice:  This list has 4 medication(s) that are the same as other medications prescribed for you. Read the directions carefully, and ask your doctor or other care provider to review them with you.

## 2017-11-27 NOTE — PROGRESS NOTES
SUBJECTIVE:                                                    Cristela Smith is a 52 year old female who presents to clinic today for the following health issues:    52-year-old woman with a history of depression anxiety, chronic pain syndrome and complex regional pain syndrome of the right upper extremity presents today for follow-up of recent visit for acute sinusitis. Patient has complaints of generalized ongoing fatigue today. Patient is concerned she needs additional antibiotics for a sinus infection. She was recently seen in urgent care and prescribed Augmentin and she is completing her 10th day roughly. She states she was given additional Augmentin and has questions whether this should be continued. Patient denies persistent headache and sinus congestion or drainage.       Main concern today is feeling fatigued for the past week. Patient initially states the symptoms are new however her partner present today states she has complained of ongoing fatigue for the past 3 years. Patient describes the fatigue as 'lack of energy' and intermittent sleepiness occurring throughout each day. Patient has had recent lab work to evaluate including CBC thyroid and sedimentation rate, Lyme test, CK, vit D-normal, B12 level was above normal range. Patient and her partner have questions today about a sodium level as she has had hyponatremia in the past. She is currently followed through the pain center for management of her chronic pain. Regimen includes oxycodone, pregabalin, and MS Contin.  Sleep habits: Tends to get to bed approximate 8:45 each evening and states she is generally up at 7.  No known history of sleep apnea.      Patient Active Problem List   Diagnosis     Hyperlipidemia LDL goal <130     Moderate major depression (H)     Anxiety     Hypertension goal BP (blood pressure) < 140/90     Cervicalgia     Post concussive syndrome     SLAP tear of shoulder     Adenomatous colon polyp     Chronic pain syndrome      Complex regional pain syndrome of right upper extremity     Prediabetes     Simple chronic bronchitis (H)     Past Surgical History:   Procedure Laterality Date     CHOLECYSTECTOMY  1994    cervical     HYSTERECTOMY, CARTER       L5-S1 laminectomy         Social History   Substance Use Topics     Smoking status: Current Every Day Smoker     Packs/day: 1.00     Years: 20.00     Types: Cigarettes     Smokeless tobacco: Never Used     Alcohol use No     Family History   Problem Relation Age of Onset     Depression Brother      brother comitted suicide in dec         Current Outpatient Prescriptions   Medication Sig Dispense Refill     albuterol (2.5 MG/3ML) 0.083% neb solution Take 1 vial (2.5 mg) by nebulization every 6 hours as needed for shortness of breath / dyspnea or wheezing 25 vial 1     ondansetron (ZOFRAN ODT) 4 MG ODT tab Take 1 tablet (4 mg) by mouth every 6 hours as needed for nausea or vomiting 20 tablet 0     albuterol (PROAIR HFA/PROVENTIL HFA/VENTOLIN HFA) 108 (90 BASE) MCG/ACT Inhaler Inhale 2 puffs into the lungs every 6 hours as needed for shortness of breath / dyspnea or wheezing 1 Inhaler 0     traZODone (DESYREL) 150 MG tablet take 1 tablet by mouth daily  at bedtime 30 tablet 1     traZODone (DESYREL) 50 MG tablet Take 1 tablet (50mg) by mouth nightly with 150 mg tablets as needed for sleep for a total of 200mg. 30 tablet 1     atorvastatin (LIPITOR) 20 MG tablet Take 1 tablet (20 mg) by mouth At Bedtime 90 tablet 3     hydrOXYzine (ATARAX) 50 MG tablet Take 2 tablets (100 mg) by mouth every 6 hours as needed for anxiety 180 tablet 5     Omega-3 Krill Oil 300 MG CAPS Take 300 mg by mouth daily       meloxicam (MOBIC) 15 MG tablet Take 1 tablet (15 mg) by mouth daily 30 tablet 1     oxyCODONE (ROXICODONE) 5 MG IR tablet Take 2 tablets (10 mg) by mouth every 4 hours as needed for pain 18 tablet 0     pregabalin (LYRICA) 100 MG capsule Take 1 capsule (100 mg) by mouth daily And 2 capsules (200 mg) at  bedtime 270 capsule 1     senna-docusate (SENOKOT-S;PERICOLACE) 8.6-50 MG per tablet Take 2 tablets by mouth 2 times daily 120 tablet 5     varenicline (CHANTIX STARTING MONTH FILIBERTO) 0.5 MG X 11 & 1 MG X 42 tablet Take 0.5 mg tab daily for 3 days, then 0.5 mg tab twice daily for 4 days, then 1 mg twice daily. 53 tablet 0     Cholecalciferol (VITAMIN D) 2000 UNITS tablet Take 2,000 Units by mouth daily 100 tablet 0     vitamin E 400 UNIT capsule Take by mouth daily 30 capsule 0     fluticasone (FLONASE) 50 MCG/ACT spray Spray 1-2 sprays into both nostrils daily as needed for rhinitis or allergies 1 Bottle 3     estropipate (OGEN) 1.5 MG tablet Take 1 tablet by mouth once daily. 90 tablet 1     cyabnocobalamin (VITAMIN B-12) 2500 MCG sublingual tablet Place 2,500 mcg under the tongue daily 90 tablet 2     naloxegol 25 MG TABS tablet Take 1 tablet (25 mg) by mouth every morning (before breakfast) 30 tablet 11     morphine (MS CONTIN) 15 MG 12 hr tablet Take 15 mg by mouth every 12 hours       clonazePAM (KLONOPIN) 0.5 MG tablet Take 0.5 mg by mouth 2 times daily as needed for anxiety       hyoscyamine (ANASPAZ,LEVSIN) 0.125 MG tablet Take 1-2 tablets (125-250 mcg) by mouth every 4 hours as needed for cramping 40 tablet 9     butalbital-acetaminophen-caffeine (FIORICET, ESGIC) -40 MG per tablet Take 1-2 tablets by mouth 4 times daily as needed         ROS: The following systems have been completely reviewed and are negative except as noted in the HPI: CONSTITUTIONAL, HEAD AND NECK, CARDIOVASCULAR, PULMONARY, GASTROINTESTINAL, GENITOURINARY, DERMATOLOGIC, NEUROLOGIC, ENDOCRINE, PSYCHIATRIC.     OBJECTIVE:                                                    BP 94/60 (Cuff Size: Adult Regular)  Pulse 71  Temp 98.7  F (37.1  C) (Oral)  Wt 166 lb (75.3 kg)  SpO2 94%  BMI 31.89 kg/m2 Body mass index is 31.89 kg/(m^2).  GENERAL:  alert,  no distress, appearing older than stated age  Psychiatric: Flat affect  HENT:  oropharynx-normal  NECK: no tenderness, no adenopathy  RESP: lungs clear to auscultation - no rales, no rhonchi, no wheezes  CV: regular rates and rhythm, normal S1 S2, no S3 or S4 and no murmur, no click or rub -  ABDOMEN: soft, no tenderness, no  hepatosplenomegaly, no masses, normal bowel sounds  MS: extremities- no edema       ASSESSMENT/PLAN:                                                        ICD-10-CM    1. Other fatigue R53.83 Comprehensive metabolic panel      52-year-old woman with a history of anxiety depression complex regional pain syndrome /chronic pain syndrome presents today for evaluation of fatigue which has persisted for the past 3 years and worse in the past week or so. Laboratory workup to date has been unremarkable. Did add comprehensive metabolic panel today. Differential discussed with the patient and her partner:   Worsening anxiety, depression, anemia, hypothyroid, medication side effects, sleep disorder or obstructive sleep apnea, occult collagen vascular disease.   consider further evaluation for sleep apnea/sleep disordered breathing if fatigue persists.  also consider polypharmacy/several medications listed have sedative properties including hydroxyzine, MS Contin, oxycodone, clonazepam, trazodone and pregabalin. Patient was reluctant to make changes to the regimen and states she is working with her pain specialist to gradually reduce her overall regimen. 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  Will contact patient with results patient will return for clinic follow-up in 2 weeks if fatigue persists.    Kyler James MD  [facility]

## 2017-11-27 NOTE — NURSING NOTE
"Chief Complaint   Patient presents with     URI       Initial BP 94/60 (Cuff Size: Adult Regular)  Pulse 71  Temp 98.7  F (37.1  C) (Oral)  Wt 166 lb (75.3 kg)  SpO2 94%  BMI 31.89 kg/m2 Estimated body mass index is 31.89 kg/(m^2) as calculated from the following:    Height as of 11/17/17: 5' 0.5\" (1.537 m).    Weight as of this encounter: 166 lb (75.3 kg).  Medication Reconciliation: jonny Crow CMA    "

## 2017-11-28 LAB
ALBUMIN SERPL-MCNC: 3.4 G/DL (ref 3.4–5)
ALP SERPL-CCNC: 99 U/L (ref 40–150)
ALT SERPL W P-5'-P-CCNC: 32 U/L (ref 0–50)
ANION GAP SERPL CALCULATED.3IONS-SCNC: 7 MMOL/L (ref 3–14)
AST SERPL W P-5'-P-CCNC: 42 U/L (ref 0–45)
BILIRUB SERPL-MCNC: 0.4 MG/DL (ref 0.2–1.3)
BUN SERPL-MCNC: 13 MG/DL (ref 7–30)
CALCIUM SERPL-MCNC: 8.9 MG/DL (ref 8.5–10.1)
CHLORIDE SERPL-SCNC: 106 MMOL/L (ref 94–109)
CO2 SERPL-SCNC: 29 MMOL/L (ref 20–32)
CREAT SERPL-MCNC: 1.07 MG/DL (ref 0.52–1.04)
GFR SERPL CREATININE-BSD FRML MDRD: 54 ML/MIN/1.7M2
GLUCOSE SERPL-MCNC: 92 MG/DL (ref 70–99)
POTASSIUM SERPL-SCNC: 4.2 MMOL/L (ref 3.4–5.3)
PROT SERPL-MCNC: 6.9 G/DL (ref 6.8–8.8)
SODIUM SERPL-SCNC: 142 MMOL/L (ref 133–144)

## 2017-11-30 ENCOUNTER — TELEPHONE (OUTPATIENT)
Dept: PEDIATRICS | Facility: CLINIC | Age: 52
End: 2017-11-30

## 2017-11-30 ENCOUNTER — OFFICE VISIT (OUTPATIENT)
Dept: PEDIATRICS | Facility: CLINIC | Age: 52
End: 2017-11-30
Payer: MEDICARE

## 2017-11-30 VITALS
SYSTOLIC BLOOD PRESSURE: 96 MMHG | HEIGHT: 60 IN | TEMPERATURE: 98 F | RESPIRATION RATE: 12 BRPM | OXYGEN SATURATION: 93 % | HEART RATE: 72 BPM | DIASTOLIC BLOOD PRESSURE: 66 MMHG | BODY MASS INDEX: 33.47 KG/M2 | WEIGHT: 170.5 LBS

## 2017-11-30 DIAGNOSIS — E55.9 VITAMIN D DEFICIENCY: ICD-10-CM

## 2017-11-30 DIAGNOSIS — G89.4 CHRONIC PAIN SYNDROME: ICD-10-CM

## 2017-11-30 DIAGNOSIS — R00.1 BRADYCARDIA: Primary | ICD-10-CM

## 2017-11-30 DIAGNOSIS — R53.82 CHRONIC FATIGUE: ICD-10-CM

## 2017-11-30 LAB
BASOPHILS # BLD AUTO: 0 10E9/L (ref 0–0.2)
BASOPHILS NFR BLD AUTO: 0.3 %
DIFFERENTIAL METHOD BLD: NORMAL
EOSINOPHIL # BLD AUTO: 0.3 10E9/L (ref 0–0.7)
EOSINOPHIL NFR BLD AUTO: 4.7 %
ERYTHROCYTE [DISTWIDTH] IN BLOOD BY AUTOMATED COUNT: 13.5 % (ref 10–15)
ERYTHROCYTE [SEDIMENTATION RATE] IN BLOOD BY WESTERGREN METHOD: 5 MM/H (ref 0–30)
HCT VFR BLD AUTO: 38.2 % (ref 35–47)
HGB BLD-MCNC: 12.8 G/DL (ref 11.7–15.7)
LYMPHOCYTES # BLD AUTO: 2.9 10E9/L (ref 0.8–5.3)
LYMPHOCYTES NFR BLD AUTO: 44.8 %
MCH RBC QN AUTO: 29.6 PG (ref 26.5–33)
MCHC RBC AUTO-ENTMCNC: 33.5 G/DL (ref 31.5–36.5)
MCV RBC AUTO: 88 FL (ref 78–100)
MONOCYTES # BLD AUTO: 0.6 10E9/L (ref 0–1.3)
MONOCYTES NFR BLD AUTO: 9.7 %
NEUTROPHILS # BLD AUTO: 2.6 10E9/L (ref 1.6–8.3)
NEUTROPHILS NFR BLD AUTO: 40.5 %
PLATELET # BLD AUTO: 214 10E9/L (ref 150–450)
RBC # BLD AUTO: 4.33 10E12/L (ref 3.8–5.2)
WBC # BLD AUTO: 6.4 10E9/L (ref 4–11)

## 2017-11-30 PROCEDURE — 86140 C-REACTIVE PROTEIN: CPT | Performed by: INTERNAL MEDICINE

## 2017-11-30 PROCEDURE — 93000 ELECTROCARDIOGRAM COMPLETE: CPT | Performed by: STUDENT IN AN ORGANIZED HEALTH CARE EDUCATION/TRAINING PROGRAM

## 2017-11-30 PROCEDURE — 85025 COMPLETE CBC W/AUTO DIFF WBC: CPT | Performed by: INTERNAL MEDICINE

## 2017-11-30 PROCEDURE — 84443 ASSAY THYROID STIM HORMONE: CPT | Performed by: INTERNAL MEDICINE

## 2017-11-30 PROCEDURE — 99214 OFFICE O/P EST MOD 30 MIN: CPT | Mod: GC | Performed by: STUDENT IN AN ORGANIZED HEALTH CARE EDUCATION/TRAINING PROGRAM

## 2017-11-30 PROCEDURE — 82550 ASSAY OF CK (CPK): CPT | Performed by: INTERNAL MEDICINE

## 2017-11-30 PROCEDURE — 85652 RBC SED RATE AUTOMATED: CPT | Performed by: INTERNAL MEDICINE

## 2017-11-30 PROCEDURE — 82306 VITAMIN D 25 HYDROXY: CPT | Performed by: INTERNAL MEDICINE

## 2017-11-30 PROCEDURE — 36415 COLL VENOUS BLD VENIPUNCTURE: CPT | Performed by: INTERNAL MEDICINE

## 2017-11-30 NOTE — MR AVS SNAPSHOT
After Visit Summary   11/30/2017    Cristela Smith    MRN: [id number]           Patient Information     Date Of Birth          1965        Visit Information        Provider Department      11/30/2017 3:45 PM Carlos Bui MD [facility] Carlos        Today's Diagnoses     Bradycardia    -  1    Chronic fatigue          Care Instructions    - will get multiple labs today; follow-up with Dr. De Los Santos in 1 week to review lab results  - 48 hr holter monitor is ordered; they should call you to  the monitor. You will have to wear this for 48 hrs and bring the monitor back to [facility]; result will be routed to Dr. De Los Santos and will discuss the result in future appointments  - follow-up with Dr. De Los Santos in 1 week to go over lab results and to review low heart rate and fatigue          Follow-ups after your visit        Additional Services     CARDIOLOGY PROCEDURE ADULT REFERRAL       Your provider has referred you to:   [facility]: Heart Care [facility] [phone]   [email or web address]    Cardiology procedures to be completed: 48 holter monitor, reason for procedure fatgiue, ? bradycardia    Please be aware that coverage of these services is subject to the terms and limitations of your health insurance plan.  Call member services at your health plan with any benefit or coverage questions.     Please bring the following to your appointment:  >>   Any x-rays, CTs or MRIs which have been performed.  Contact the facility where they were done to arrange for  prior to your scheduled appointment.   >>   List of current medications  >>   This referral request   >>   Any documents/labs given to you for this referral    [facility]   For scheduling questions call [phone]    [facility]  For scheduling questions call [phone]                  Follow-up notes from your care team     Return  "in about 8 days (around 12/8/2017) for follow-up on lab, bradycardia, and fatigue.      Your next 10 appointments already scheduled     Dec 08, 2017 10:40 AM CST   SHORT with Halle De Los Santos MD   [facility] [place] ([facility])    [address]  [address]  [place] [address] [phone]            Dec 13, 2017 10:20 AM CST   SHORT with Halle De Los Santos MD   [facility] [place] ([facility])    [address]  [address]  [place] [id number] [phone]              Future tests that were ordered for you today     Open Future Orders        Priority Expected Expires Ordered    Holter Monitor 48 hour - Adult Routine  1/14/2018 11/30/2017            Who to contact     If you have questions or need follow up information about today's clinic visit or your schedule please contact [facility] directly at [phone].  Normal or non-critical lab and imaging results will be communicated to you by eXpressohart, letter or phone within 4 business days after the clinic has received the results. If you do not hear from us within 7 days, please contact the clinic through Calle[employer] or phone. If you have a critical or abnormal lab result, we will notify you by phone as soon as possible.  Submit refill requests through [employer]Ã¢â€žÂ¢ or call your pharmacy and they will forward the refill request to us. Please allow 3 business days for your refill to be completed.          Additional Information About Your Visit        [employer] Information     Inside[employer]Ã¢â€žÂ¢ lets you send messages to your doctor, view your test results, renew your prescriptions, schedule appointments and more. To sign up, go to [email or web address] . Click on \"Log in\" on the left side of the screen, which will take you to the Welcome page. Then click on \"Sign up Now\" on the right side of the page.     You will be asked to enter the access code listed below, as well as some personal " "information. Please follow the directions to create your username and password.     Your access code is: [email or web address]  Expires: 2018  9:14 AM     Your access code will  in 90 days. If you need help or a new code, please call your [place] clinic or [phone].        Care EveryWhere ID     This is your Care EveryWhere ID. This could be used by other organizations to access your [place] medical records  [id number]        Your Vitals Were     Pulse Temperature Respirations Height Pulse Oximetry BMI (Body Mass Index)    72 98  F (36.7  C) (Oral) 12 5' 0.05\" (1.525 m) 92% 33.24 kg/m2       Blood Pressure from Last 3 Encounters:   17 96/66   17 94/60   17 (!) 88/60    Weight from Last 3 Encounters:   17 170 lb 8 oz (77.3 kg)   17 166 lb (75.3 kg)   17 167 lb 9.6 oz (76 kg)              We Performed the Following     CARDIOLOGY PROCEDURE ADULT REFERRAL     CBC with platelets differential     CK total     CRP inflammation     EKG 12-lead complete w/read - Clinics     Erythrocyte sedimentation rate auto     TSH with free T4 reflex        Primary Care Provider Office Phone # Fax #    Halle De Los Santos [phone] [address] [place] [address]        Equal Access to Services     [facility]: Hadii aad ku hadasho Soomaali, waaxda luqadaha, qaybta kaalmada collinyada, daniel membreno . So [facility] [phone].    ATENCIÓN: Si habla español, tiene a rowland disposición servicios gratuitos de asistencia lingüística. Llame al [phone].    We comply with applicable federal civil rights laws and [place] laws. We do not discriminate on the basis of race, color, national origin, age, disability, sex, sexual orientation, or gender identity.            Thank you!     Thank you for choosing [facility] [place]  for your care. Our goal is always to provide you with excellent care. Hearing back from our patients is one " way we can continue to improve our services. Please take a few minutes to complete the written survey that you may receive in the mail after your visit with us. Thank you!             Your Updated Medication List - Protect others around you: Learn how to safely use, store and throw away your medicines at www.disposemymeds.org.          This list is accurate as of: 11/30/17  5:11 PM.  Always use your most recent med list.                   Brand Name Dispense Instructions for use Diagnosis    * albuterol 108 (90 BASE) MCG/ACT Inhaler    PROAIR HFA/PROVENTIL HFA/VENTOLIN HFA    1 Inhaler    Inhale 2 puffs into the lungs every 6 hours as needed for shortness of breath / dyspnea or wheezing    Viral URI with cough       * albuterol (2.5 MG/3ML) 0.083% neb solution     25 vial    Take 1 vial (2.5 mg) by nebulization every 6 hours as needed for shortness of breath / dyspnea or wheezing    Mild reactive airways disease, unspecified whether persistent       atorvastatin 20 MG tablet    LIPITOR    90 tablet    Take 1 tablet (20 mg) by mouth At Bedtime    Hyperlipidemia LDL goal <130       butalbital-acetaminophen-caffeine -40 MG per tablet    FIORICET/ESGIC     Take 1-2 tablets by mouth 4 times daily as needed        clonazePAM 0.5 MG tablet    klonoPIN     Take 0.5 mg by mouth 2 times daily as needed for anxiety        cyabnocobalamin 2500 MCG sublingual tablet    VITAMIN B-12    90 tablet    Place 2,500 mcg under the tongue daily    Vitamin B12 deficiency (non anemic)       estropipate 1.5 MG tablet    OGEN    90 tablet    Take 1 tablet by mouth once daily.    Menopausal syndrome (hot flashes)       fluticasone 50 MCG/ACT spray    FLONASE    1 Bottle    Spray 1-2 sprays into both nostrils daily as needed for rhinitis or allergies    Other chronic rhinitis       hydrOXYzine 50 MG tablet    ATARAX    180 tablet    Take 2 tablets (100 mg) by mouth every 6 hours as needed for anxiety    Anxiety       hyoscyamine 0.125 MG  tablet    ANASPAZ/LEVSIN    40 tablet    Take 1-2 tablets (125-250 mcg) by mouth every 4 hours as needed for cramping    Irritable bowel syndrome without diarrhea       LYRICA 100 MG capsule   Generic drug:  pregabalin     270 capsule    Take 1 capsule (100 mg) by mouth daily And 2 capsules (200 mg) at bedtime        MOBIC 15 MG tablet   Generic drug:  meloxicam     30 tablet    Take 1 tablet (15 mg) by mouth daily        MS CONTIN 15 MG 12 hr tablet   Generic drug:  morphine      Take 15 mg by mouth every 12 hours        naloxegol 25 MG Tabs tablet     30 tablet    Take 1 tablet (25 mg) by mouth every morning (before breakfast)    Drug-induced constipation       Omega-3 Krill Oil 300 MG Caps      Take 300 mg by mouth daily        ondansetron 4 MG ODT tab    ZOFRAN ODT    20 tablet    Take 1 tablet (4 mg) by mouth every 6 hours as needed for nausea or vomiting    Nausea       oxyCODONE IR 5 MG tablet    ROXICODONE    18 tablet    Take 2 tablets (10 mg) by mouth every 4 hours as needed for pain        senna-docusate 8.6-50 MG per tablet    SENOKOT-S;PERICOLACE    120 tablet    Take 2 tablets by mouth 2 times daily    Other constipation       * traZODone 150 MG tablet    DESYREL    30 tablet    take 1 tablet by mouth daily  at bedtime    Primary insomnia       * traZODone 50 MG tablet    DESYREL    30 tablet    Take 1 tablet (50mg) by mouth nightly with 150 mg tablets as needed for sleep for a total of 200mg.    Primary insomnia       varenicline 0.5 MG X 11 & 1 MG X 42 tablet    CHANTIX STARTING MONTH FILIBERTO    53 tablet    Take 0.5 mg tab daily for 3 days, then 0.5 mg tab twice daily for 4 days, then 1 mg twice daily.    Tobacco abuse       vitamin D 2000 UNITS tablet     100 tablet    Take 2,000 Units by mouth daily        vitamin E 400 UNIT capsule     30 capsule    Take by mouth daily        * Notice:  This list has 4 medication(s) that are the same as other medications prescribed for you. Read the directions  carefully, and ask your doctor or other care provider to review them with you.

## 2017-11-30 NOTE — NURSING NOTE
"Chief Complaint   Patient presents with     Bradycardia       Initial BP 96/66  Pulse 72  Temp 98  F (36.7  C) (Oral)  Resp 12  Ht 5' 0.05\" (1.525 m)  Wt 170 lb 8 oz (77.3 kg)  SpO2 92%  BMI 33.24 kg/m2 Estimated body mass index is 33.24 kg/(m^2) as calculated from the following:    Height as of this encounter: 5' 0.05\" (1.525 m).    Weight as of this encounter: 170 lb 8 oz (77.3 kg).  Medication Reconciliation: complete   Joy Chapman MA      "

## 2017-11-30 NOTE — PROGRESS NOTES
SUBJECTIVE:   Cristela Smith is a 52 year old female who presents to clinic today for the following health issues:      Bradycardia       Onset: 2 weeks     Description (location/character/radiation/duration): left and center     Intensity:  moderate    Accompanying signs and symptoms:        Shortness of breath: YES       Sweating: YES       Nausea/vomitting: YES- on and off        Palpitations: YES- flutters and jumps        Other (fevers/chills/cough/heartburn/lightheadedness): YES- extreme fatigue, dizziness, lightheadedness, and unbalanced, more forgetful     History (similar episodes/previous evaluation): None    Precipitating or alleviating factors:       Worse with exertion: YES       Worse with breathing: YES       Related to eating: not applicale       Better with burping: not applicable     Therapies tried and outcome: None    Cristela is a 53 yo F w/ hx of fibromyalgia and complex regional pain syndrome followed by [facility] on multiple pain/sedative medications who presents to clinic today with a 2-week hx of bradycardia and fatigue as well as couple months history of chest pain and fluttering. She had a procedure done at pain center this morning including sympathetic block. She states that she usually gets little bit of versed and sometimes propofol. This morning prior to procedure, she states that her heart rate was in 40s but spontaneously went back up to 60s. After the procedure, her heart rate was again low and also slightly hypoxic to high 80s. She was told to follow-up with PCP and possibly need cardiology referral. In addition to frequent pain center procedures, she also takes dilaudid, oxycodone, MS contin, pregabaline, trazodone, and klonopin PRN which are all monitored by [facility]. On top of her low heart rate, she has had fatigue for a while and was seen at clinic multiple times within 2 weeks. So far work-ups have been negative. She also complains of chest pain that is  located in midstrenum/left breast but other than location, she cannot describe it well. She also feels like her heart is fluttering intermittently. Her heart rate and blood pressure have bene quite variable past 6 months or so. In addition, she was also treated with antibiotic recently for upper respiratory symptoms.    Problem list and histories reviewed & adjusted, as indicated.  Additional history: as documented    Patient Active Problem List   Diagnosis     Hyperlipidemia LDL goal <130     Moderate major depression (H)     Anxiety     Hypertension goal BP (blood pressure) < 140/90     Cervicalgia     Post concussive syndrome     SLAP tear of shoulder     Adenomatous colon polyp     Chronic pain syndrome     Complex regional pain syndrome of right upper extremity     Prediabetes     Simple chronic bronchitis (H)     Past Surgical History:   Procedure Laterality Date     CHOLECYSTECTOMY  1994    cervical     HYSTERECTOMY, CARTER       L5-S1 laminectomy         Social History   Substance Use Topics     Smoking status: Current Every Day Smoker     Packs/day: 1.00     Years: 20.00     Types: Cigarettes     Smokeless tobacco: Never Used     Alcohol use No     Family History   Problem Relation Age of Onset     Depression Brother      brother comitted suicide in dec     DIABETES Mother      HEART DISEASE Mother      CEREBROVASCULAR DISEASE Maternal Grandfather      Skin Cancer Paternal Grandfather      HEART DISEASE Maternal Uncle      HEART DISEASE Paternal Uncle      Brain Cancer Paternal Uncle      HEART DISEASE Maternal Aunt      Breast Cancer Maternal Aunt          Current Outpatient Prescriptions   Medication Sig Dispense Refill     albuterol (2.5 MG/3ML) 0.083% neb solution Take 1 vial (2.5 mg) by nebulization every 6 hours as needed for shortness of breath / dyspnea or wheezing 25 vial 1     ondansetron (ZOFRAN ODT) 4 MG ODT tab Take 1 tablet (4 mg) by mouth every 6 hours as needed for nausea or vomiting 20 tablet  0     traZODone (DESYREL) 150 MG tablet take 1 tablet by mouth daily  at bedtime 30 tablet 1     traZODone (DESYREL) 50 MG tablet Take 1 tablet (50mg) by mouth nightly with 150 mg tablets as needed for sleep for a total of 200mg. 30 tablet 1     atorvastatin (LIPITOR) 20 MG tablet Take 1 tablet (20 mg) by mouth At Bedtime 90 tablet 3     hydrOXYzine (ATARAX) 50 MG tablet Take 2 tablets (100 mg) by mouth every 6 hours as needed for anxiety 180 tablet 5     Omega-3 Krill Oil 300 MG CAPS Take 300 mg by mouth daily       meloxicam (MOBIC) 15 MG tablet Take 1 tablet (15 mg) by mouth daily 30 tablet 1     oxyCODONE (ROXICODONE) 5 MG IR tablet Take 2 tablets (10 mg) by mouth every 4 hours as needed for pain 18 tablet 0     pregabalin (LYRICA) 100 MG capsule Take 1 capsule (100 mg) by mouth daily And 2 capsules (200 mg) at bedtime 270 capsule 1     senna-docusate (SENOKOT-S;PERICOLACE) 8.6-50 MG per tablet Take 2 tablets by mouth 2 times daily 120 tablet 5     Cholecalciferol (VITAMIN D) 2000 UNITS tablet Take 2,000 Units by mouth daily 100 tablet 0     vitamin E 400 UNIT capsule Take by mouth daily 30 capsule 0     estropipate (OGEN) 1.5 MG tablet Take 1 tablet by mouth once daily. 90 tablet 1     cyabnocobalamin (VITAMIN B-12) 2500 MCG sublingual tablet Place 2,500 mcg under the tongue daily 90 tablet 2     naloxegol 25 MG TABS tablet Take 1 tablet (25 mg) by mouth every morning (before breakfast) 30 tablet 11     morphine (MS CONTIN) 15 MG 12 hr tablet Take 15 mg by mouth every 12 hours       clonazePAM (KLONOPIN) 0.5 MG tablet Take 0.5 mg by mouth 2 times daily as needed for anxiety       hyoscyamine (ANASPAZ,LEVSIN) 0.125 MG tablet Take 1-2 tablets (125-250 mcg) by mouth every 4 hours as needed for cramping 40 tablet 9     albuterol (PROAIR HFA/PROVENTIL HFA/VENTOLIN HFA) 108 (90 BASE) MCG/ACT Inhaler Inhale 2 puffs into the lungs every 6 hours as needed for shortness of breath / dyspnea or wheezing (Patient not  "taking: Reported on 11/30/2017) 1 Inhaler 0     varenicline (CHANTIX STARTING MONTH FILIBERTO) 0.5 MG X 11 & 1 MG X 42 tablet Take 0.5 mg tab daily for 3 days, then 0.5 mg tab twice daily for 4 days, then 1 mg twice daily. (Patient not taking: Reported on 11/30/2017) 53 tablet 0     fluticasone (FLONASE) 50 MCG/ACT spray Spray 1-2 sprays into both nostrils daily as needed for rhinitis or allergies (Patient not taking: Reported on 11/30/2017) 1 Bottle 3     butalbital-acetaminophen-caffeine (FIORICET, ESGIC) -40 MG per tablet Take 1-2 tablets by mouth 4 times daily as needed       Allergies   Allergen Reactions     Aspirin      Sulfa Drugs      Tongue and roof of mouth feels burning sensation. 20 years ago     Topamax [Topiramate] Itching and Swelling     Toprol Xl [Metoprolol]      Latex Rash         Reviewed and updated as needed this visit by clinical staffTobacco  Allergies  Meds  Problems  Med Hx  Surg Hx  Fam Hx  Soc Hx        Reviewed and updated as needed this visit by Provider  Allergies  Meds  Problems         ROS:  - please refer to HPI for complete ROS    OBJECTIVE:     BP 96/66  Pulse 72  Temp 98  F (36.7  C) (Oral)  Resp 12  Ht 5' 0.05\" (1.525 m)  Wt 170 lb 8 oz (77.3 kg)  SpO2 92%  BMI 33.24 kg/m2  Body mass index is 33.24 kg/(m^2).     GENERAL: Alert and oriented but appears tired  EYES: Eyes grossly normal to inspection, PERRL and conjunctivae and sclerae normal  RESP: Non-labored respirations on 2 L O2, poor effort in deep inspirations however lungs are clear to auscultation - no rales, rhonchi or wheezes  CV: regular rate and rhythm, normal S1 S2, no S3 or S4, no murmur, click or rub, no peripheral edema and peripheral pulses palpable  MS: no gross musculoskeletal defects noted, no edema  SKIN: no suspicious lesions or rashes  NEURO: Alert and oriented, normal strength and sensation  PSYCH: Normal mood (denies sad/depressed mood), affect is normal    Diagnostic Test Results:  EKG - " sinus bradycardia, normal axis, normal intervals, no acute ST/T changes c/w ischemia, no LVH by voltage criteria  CBC w/ diff: normal  CK: pending  Vitamin D: pending  CRP: pending  ESR: pending  TSH w/ reflex to T4: pending    ASSESSMENT/PLAN:   1. Bradycardia  Per report, she has been having bradycardia to 40s at home and at pain center. No personal cardiac history. At today's visit, her heart rate is ranging from high 50s to 60s. Physical exam shows slight bradycardia but no murmurs and regular rhythm. EKG is consistent with this. She is on multiple pain medications which could contribute to her reported bradycardia. Also, she does get versed and propofol for her procedure which can also affect her heart rate. Not clear whether sympathetic block procedure itself can cause inhibition of sympathetic system controlling heart rate. Since resting EKG does not show clear bradycardia and other cardiac source, would be benefit from 48 hr holter monitoring to capture any possible bradycardia at home. This will also give more information for PCP and Cardiology if needed. This was discussed with patient and she agreed. More importantly, it is important to determine whether polypharmacy with multiple pain and sedative medications is the culprit of her condition. This was emphasized to patient and encouraged her to talk again with her pain specialist and PCP  - EKG 12-lead complete w/read - Clinics  - TSH with free T4 reflex  - Erythrocyte sedimentation rate auto  - CRP inflammation  - CK total  - CBC with platelets differential  - CARDIOLOGY PROCEDURE ADULT REFERRAL  - Holter Monitor 48 hour - Adult; Future    2. Chronic fatigue  Unclear etiology. Work-ups so far have been negative with TSH, CBC, CMP. Will check few more labs including inflammatory markers, CK, repeat CBC, and vitamin D to complete the work-up; however, more concerning factor is polypharmacy with various pain medications and other sedative medications. Per  patient, she is taking less than what it is prescribed so not clear the exact amount of medications she is taking. Clear and michelle discussion about her polymedications would be really beneficial, hopefully involving pain specialist from [facility] as well  - EKG 12-lead complete w/read - Clinics  - TSH with free T4 reflex  - Erythrocyte sedimentation rate auto  - CRP inflammation  - CK total  - CBC with platelets differential  - CARDIOLOGY PROCEDURE ADULT REFERRAL  - Holter Monitor 48 hour - Adult; Future  - Vitamin D deficiency screening    3. Vitamin D deficiency   - Vitamin D deficiency screening    FUTURE APPOINTMENTS:       - Follow-up visit on 12/8 with Dr. De Los Santos as previously scheduled    Patient was discussed with Dr. Hu Bui MD  [facility]    Patient seen and examined with Dr. Bui. I was present for the key portions of the visit including the history and physical exam. His progress note reflects our joint assessment and plan.     Jamel Lui M.D.  Internal Medicine-Pediatrics

## 2017-11-30 NOTE — TELEPHONE ENCOUNTER
Cecil Roberson, calling.  Patient was seen at pain clinic today and her pulse was in the low 40s and weak.  Patient feels lightheaded and weak.  Is tired, but this is normal after a visit to the pain clinic.  No chest pain, shortness of breath or syncope  Patient was given anesthesia by the pain clinic.  Pain doctor wants her seen by primary care or cardiology today.  Discussed with Dr. Bagley for patient to be seen in the clinic.  Appointment scheduled this afternoon.  Advised if she notes chest pain, shortness of breath, syncope, will need to go to the ER.  Patient sees Dr. Beyer at [facility] [address] [facility].  Cecil notified of appointment time.  LMTCB for Garrick Cassidy RN

## 2017-11-30 NOTE — PATIENT INSTRUCTIONS
- will get multiple labs today; follow-up with Dr. De Los Santos in 1 week to review lab results  - 48 hr holter monitor is ordered; they should call you to  the monitor. You will have to wear this for 48 hrs and bring the monitor back to [facility]; result will be routed to Dr. De Los Santos and will discuss the result in future appointments  - follow-up with Dr. De Los Santos in 1 week to go over lab results and to review low heart rate and fatigue

## 2017-12-01 LAB
CK SERPL-CCNC: 158 U/L (ref 30–225)
CRP SERPL-MCNC: <2.9 MG/L (ref 0–8)
DEPRECATED CALCIDIOL+CALCIFEROL SERPL-MC: 54 UG/L (ref 20–75)
TSH SERPL DL<=0.005 MIU/L-ACNC: 1.6 MU/L (ref 0.4–4)

## 2017-12-04 ENCOUNTER — HOSPITAL ENCOUNTER (OUTPATIENT)
Dept: CARDIOLOGY | Facility: CLINIC | Age: 52
Discharge: HOME | End: 2017-12-04
Attending: INTERNAL MEDICINE | Admitting: INTERNAL MEDICINE
Payer: MEDICARE

## 2017-12-04 DIAGNOSIS — R53.82 CHRONIC FATIGUE: ICD-10-CM

## 2017-12-04 DIAGNOSIS — R00.1 BRADYCARDIA: ICD-10-CM

## 2017-12-04 PROCEDURE — 93226 XTRNL ECG REC<48 HR SCAN A/R: CPT

## 2017-12-04 PROCEDURE — 93227 XTRNL ECG REC<48 HR R&I: CPT | Performed by: INTERNAL MEDICINE

## 2017-12-07 DIAGNOSIS — R53.83 FATIGUE: ICD-10-CM

## 2017-12-07 DIAGNOSIS — R00.1 BRADYCARDIA: Primary | ICD-10-CM

## 2017-12-08 ENCOUNTER — OFFICE VISIT (OUTPATIENT)
Dept: PEDIATRICS | Facility: CLINIC | Age: 52
End: 2017-12-08
Payer: MEDICARE

## 2017-12-08 VITALS
HEIGHT: 61 IN | DIASTOLIC BLOOD PRESSURE: 60 MMHG | WEIGHT: 167.2 LBS | SYSTOLIC BLOOD PRESSURE: 88 MMHG | HEART RATE: 76 BPM | BODY MASS INDEX: 31.57 KG/M2 | TEMPERATURE: 98.1 F | OXYGEN SATURATION: 93 %

## 2017-12-08 DIAGNOSIS — M62.81 GENERALIZED MUSCLE WEAKNESS: ICD-10-CM

## 2017-12-08 DIAGNOSIS — R53.83 FATIGUE, UNSPECIFIED TYPE: ICD-10-CM

## 2017-12-08 DIAGNOSIS — R00.1 BRADYCARDIA: Primary | ICD-10-CM

## 2017-12-08 PROCEDURE — 99214 OFFICE O/P EST MOD 30 MIN: CPT | Performed by: INTERNAL MEDICINE

## 2017-12-08 NOTE — NURSING NOTE
"Chief Complaint   Patient presents with     Bradycardia     would like referral to cardiology       Initial BP (!) 88/60 (BP Location: Right arm, Patient Position: Chair, Cuff Size: Adult Large)  Pulse 76  Temp 98.1  F (36.7  C) (Tympanic)  Ht 5' 0.5\" (1.537 m)  Wt 167 lb 3.2 oz (75.8 kg)  SpO2 93%  BMI 32.12 kg/m2 Estimated body mass index is 32.12 kg/(m^2) as calculated from the following:    Height as of this encounter: 5' 0.5\" (1.537 m).    Weight as of this encounter: 167 lb 3.2 oz (75.8 kg).  Medication Reconciliation: complete   Dalila Zabala LPN      "

## 2017-12-08 NOTE — MR AVS SNAPSHOT
After Visit Summary   12/8/2017    Cristela Smith    MRN: [id number]           Patient Information     Date Of Birth          1965        Visit Information        Provider Department      12/8/2017 10:40 AM Halle De Los Santos MD [facility]        Today's Diagnoses     Bradycardia    -  1    Fatigue, unspecified type        Generalized muscle weakness           Follow-ups after your visit        Additional Services     CARDIOLOGY EVAL ADULT REFERRAL       Your provider has referred you to:  [facility]: [facility] [phone]   [email or web address]    Please be aware that coverage of these services is subject to the terms and limitations of your health insurance plan.  Call member services at your health plan with any benefit or coverage questions.      Type of Referral:  New Cardiology Consult    Timeframe requested:  1 Week    Please bring the following to your appointment:  >>   Any x-rays, CTs or MRIs which have been performed.  Contact the facility where they were done to arrange for  prior to your scheduled appointment.  Any new CT, MRI or other procedures ordered by your specialist must be performed at a [place] facility or coordinated by your clinic's referral office.   >>   List of current medications  >>   This referral request   >>   Any documents/labs given to you for this referral                  Your next 10 appointments already scheduled     Dec 13, 2017 10:20 AM CST   SHORT with Halle De Los Santos MD   [facility] ([facility])    [address]  [address]  [facility] [address] [phone]              Future tests that were ordered for you today     Open Future Orders        Priority Expected Expires Ordered    Holter Monitor 48 hour - Adult Routine 12/29/2017 1/21/2018 12/7/2017            Who to contact     If you have  "questions or need follow up information about today's clinic visit or your schedule please contact [facility] directly at [phone].  Normal or non-critical lab and imaging results will be communicated to you by MyChart, letter or phone within 4 business days after the clinic has received the results. If you do not hear from us within 7 days, please contact the clinic through [employer] or phone. If you have a critical or abnormal lab result, we will notify you by phone as soon as possible.  Submit refill requests through [employer] or call your pharmacy and they will forward the refill request to us. Please allow 3 business days for your refill to be completed.          Additional Information About Your Visit        [email or web address] Information     [employer] lets you send messages to your doctor, view your test results, renew your prescriptions, schedule appointments and more. To sign up, go to [email or web address] . Click on \"Log in\" on the left side of the screen, which will take you to the Welcome page. Then click on \"Sign up Now\" on the right side of the page.     You will be asked to enter the access code listed below, as well as some personal information. Please follow the directions to create your username and password.     Your access code is: [email or web address]  Expires: 2018  9:14 AM     Your access code will  in 90 days. If you need help or a new code, please call your [place] clinic or [phone].        Care EveryWhere ID     This is your Care EveryWhere ID. This could be used by other organizations to access your [facility] medical records  [id number]        Your Vitals Were     Pulse Temperature Height Pulse Oximetry BMI (Body Mass Index)       76 98.1  F (36.7  C) (Tympanic) 5' 0.5\" (1.537 m) 93% 32.12 kg/m2        Blood Pressure from Last 3 Encounters:   17 (!) 88/60   17 96/66   17 94/60    Weight from Last 3 Encounters:   17 167 lb 3.2 oz (75.8 kg)   17 170 " lb 8 oz (77.3 kg)   11/27/17 166 lb (75.3 kg)              We Performed the Following     CARDIOLOGY EVAL ADULT REFERRAL        Primary Care Provider Office Phone # Fax #    Halle De Los Santos [phone] [address] [place] [address]        Equal Access to Services     [facility]: Hadii aad ku hadasho Soomaali, waaxda luqadaha, qaybta kaalmada adeegyada, waxay idiin hayaan adeeg jose mariash la'aan . [facility] [phone].    ATENCIÓN: Si habla español, tiene a rowland disposición servicios gratuitos de asistencia lingüística. [facility] [phone].    We comply with applicable federal civil rights laws and [place] laws. We do not discriminate on the basis of race, color, national origin, age, disability, sex, sexual orientation, or gender identity.            Thank you!     Thank you for choosing [facility]  for your care. Our goal is always to provide you with excellent care. Hearing back from our patients is one way we can continue to improve our services. Please take a few minutes to complete the written survey that you may receive in the mail after your visit with us. Thank you!             Your Updated Medication List - Protect others around you: Learn how to safely use, store and throw away your medicines at www.disposemymeds.org.          This list is accurate as of: 12/8/17 11:34 AM.  Always use your most recent med list.                   Brand Name Dispense Instructions for use Diagnosis    * albuterol 108 (90 BASE) MCG/ACT Inhaler    PROAIR HFA/PROVENTIL HFA/VENTOLIN HFA    1 Inhaler    Inhale 2 puffs into the lungs every 6 hours as needed for shortness of breath / dyspnea or wheezing    Viral URI with cough       * albuterol (2.5 MG/3ML) 0.083% neb solution     25 vial    Take 1 vial (2.5 mg) by nebulization every 6 hours as needed for shortness of breath / dyspnea or wheezing    Mild reactive airways disease, unspecified whether persistent        atorvastatin 20 MG tablet    LIPITOR    90 tablet    Take 1 tablet (20 mg) by mouth At Bedtime    Hyperlipidemia LDL goal <130       butalbital-acetaminophen-caffeine -40 MG per tablet    FIORICET/ESGIC     Take 1-2 tablets by mouth 4 times daily as needed        clonazePAM 0.5 MG tablet    klonoPIN     Take 0.5 mg by mouth 2 times daily as needed for anxiety        cyabnocobalamin 2500 MCG sublingual tablet    VITAMIN B-12    90 tablet    Place 2,500 mcg under the tongue daily    Vitamin B12 deficiency (non anemic)       estropipate 1.5 MG tablet    OGEN    90 tablet    Take 1 tablet by mouth once daily.    Menopausal syndrome (hot flashes)       fluticasone 50 MCG/ACT spray    FLONASE    1 Bottle    Spray 1-2 sprays into both nostrils daily as needed for rhinitis or allergies    Other chronic rhinitis       hydrOXYzine 50 MG tablet    ATARAX    180 tablet    Take 2 tablets (100 mg) by mouth every 6 hours as needed for anxiety    Anxiety       hyoscyamine 0.125 MG tablet    ANASPAZ/LEVSIN    40 tablet    Take 1-2 tablets (125-250 mcg) by mouth every 4 hours as needed for cramping    Irritable bowel syndrome without diarrhea       LYRICA 100 MG capsule   Generic drug:  pregabalin     270 capsule    Take 1 capsule (100 mg) by mouth daily And 2 capsules (200 mg) at bedtime        MOBIC 15 MG tablet   Generic drug:  meloxicam     30 tablet    Take 1 tablet (15 mg) by mouth daily        MS CONTIN 15 MG 12 hr tablet   Generic drug:  morphine      Take 15 mg by mouth every 12 hours        naloxegol 25 MG Tabs tablet     30 tablet    Take 1 tablet (25 mg) by mouth every morning (before breakfast)    Drug-induced constipation       naloxone nasal spray    NARCAN    0.2 mL    Spray 1 spray (4 mg) into one nostril alternating nostrils as needed for opioid reversal every 2-3 minutes until assistance arrives    Chronic pain syndrome       Omega-3 Krill Oil 300 MG Caps      Take 300 mg by mouth daily        ondansetron 4  MG ODT tab    ZOFRAN ODT    20 tablet    Take 1 tablet (4 mg) by mouth every 6 hours as needed for nausea or vomiting    Nausea       oxyCODONE IR 5 MG tablet    ROXICODONE    18 tablet    Take 2 tablets (10 mg) by mouth every 4 hours as needed for pain        senna-docusate 8.6-50 MG per tablet    SENOKOT-S;PERICOLACE    120 tablet    Take 2 tablets by mouth 2 times daily    Other constipation       * traZODone 150 MG tablet    DESYREL    30 tablet    take 1 tablet by mouth daily  at bedtime    Primary insomnia       * traZODone 50 MG tablet    DESYREL    30 tablet    Take 1 tablet (50mg) by mouth nightly with 150 mg tablets as needed for sleep for a total of 200mg.    Primary insomnia       varenicline 0.5 MG X 11 & 1 MG X 42 tablet    CHANTIX STARTING MONTH PAK    53 tablet    Take 0.5 mg tab daily for 3 days, then 0.5 mg tab twice daily for 4 days, then 1 mg twice daily.    Tobacco abuse       vitamin D 2000 UNITS tablet     100 tablet    Take 2,000 Units by mouth daily        vitamin E 400 UNIT capsule     30 capsule    Take by mouth daily        * Notice:  This list has 4 medication(s) that are the same as other medications prescribed for you. Read the directions carefully, and ask your doctor or other care provider to review them with you.

## 2017-12-08 NOTE — PROGRESS NOTES
"  SUBJECTIVE:   Cristela Smith is a 52 year old female who presents to clinic today for the following health issues:      Patient here for follow up low HR and cardiology referral    51 y/o F with h/o fibromyalgia and complex regional pain syndrome after a fall at work and followed by [facility] for multiple pain medications and procedures. Patient seen on 11/30 by Dr. Bui for concerns of low HR. HR to 40's prior to procedures and getting medication. Following the procedure she was again low and a little hypoxic to the low 80's. Has had fatigue for last several months with lab work negative thus far. Reports sleeping sometimes for 24 hours at a time. She reports she has had HR into the 40's for several years. Previously worked as a medical assistant and sometimes would be in the 40's when she tested herself. She was asymptomatic when she had low HR in the past. At that visit was referred for holter monitor and also had some more lab work done. Unfortunately, the results of the holter monitor are not yet back.    Reviewed and updated as needed this visit by clinical staff  Tobacco  Allergies  Meds  Problems  Med Hx  Surg Hx  Fam Hx  Soc Hx        Reviewed and updated as needed this visit by Provider  Allergies  Meds  Problems       -------------------------------------    ROS:  Constitutional, HEENT, cardiovascular, pulmonary, gi and gu systems are negative, except as otherwise noted.      OBJECTIVE:                                                    BP (!) 88/60 (BP Location: Right arm, Patient Position: Chair, Cuff Size: Adult Large)  Pulse 76  Temp 98.1  F (36.7  C) (Tympanic)  Ht 5' 0.5\" (1.537 m)  Wt 167 lb 3.2 oz (75.8 kg)  SpO2 93%  BMI 32.12 kg/m2   Body mass index is 32.12 kg/(m^2).  General Appearance: tired appearing, alert and no distress  Eyes:   no discharge, erythema.  Normal pupils.  Respiratory: lungs clear to auscultation - no rales, rhonchi or wheezes.  Cardiovascular: " regular rate and rhythm, normal S1 S2, no S3 or S4 and no murmur, click or rub.  No peripheral edema.  Skin: no rashes or lesions.  Well perfused and normal turgor.    Diagnostic Test Results:  Component      Latest Ref Rng & Units 11/30/2017   WBC      4.0 - 11.0 10e9/L 6.4   RBC Count      3.8 - 5.2 10e12/L 4.33   Hemoglobin      11.7 - 15.7 g/dL 12.8   Hematocrit      35.0 - 47.0 % 38.2   MCV      78 - 100 fl 88   MCH      26.5 - 33.0 pg 29.6   MCHC      31.5 - 36.5 g/dL 33.5   RDW      10.0 - 15.0 % 13.5   Platelet Count      150 - 450 10e9/L 214   Diff Method       Automated Method   % Neutrophils      % 40.5   % Lymphocytes      % 44.8   % Monocytes      % 9.7   % Eosinophils      % 4.7   % Basophils      % 0.3   Absolute Neutrophil      1.6 - 8.3 10e9/L 2.6   Absolute Lymphocytes      0.8 - 5.3 10e9/L 2.9   Absolute Monocytes      0.0 - 1.3 10e9/L 0.6   Absolute Eosinophils      0.0 - 0.7 10e9/L 0.3   Absolute Basophils      0.0 - 0.2 10e9/L 0.0   TSH      0.40 - 4.00 mU/L 1.60   Sed Rate      0 - 30 mm/h 5   CRP Inflammation      0.0 - 8.0 mg/L <2.9   CK Total      30 - 225 U/L 158   Vitamin D Deficiency screening      20 - 75 ug/L 54        ASSESSMENT/PLAN:                                                      (R00.1) Bradycardia  (primary encounter diagnosis)  (R53.83) Fatigue, unspecified type  (M62.81) Generalized muscle weakness  Comment: holter results not yet available. Bradycardia certainly could be due to her pain medications; however, she is taking lower doses than previously and did not have documented low HR during her numerous prior procedures at the pain clinic. Concerned with her severe fatigue over the past several months, that she could be having sick sinus that is causing her fatigue with the lower HR's seen recently. Labs to date have been negative including CBC, CMP, TSH, vitamin D, CRP, CK.   Plan: CARDIOLOGY EVAL ADULT REFERRAL  - will f/u next week to discuss results of holter  monitor  - referral also placed to Cardiology so she can get something set up. Even if holter negative, would appreciate Cardiologies opinion of bradycardia.   - if cardiology work-up negative, would recommend sleep study/evaluation    Follow up with Provider - 1 week     [facility]

## 2017-12-11 ENCOUNTER — TELEPHONE (OUTPATIENT)
Dept: PEDIATRICS | Facility: CLINIC | Age: 52
End: 2017-12-11

## 2017-12-11 DIAGNOSIS — G89.4 CHRONIC PAIN SYNDROME: Primary | ICD-10-CM

## 2017-12-12 ENCOUNTER — TRANSFERRED RECORDS (OUTPATIENT)
Dept: HEALTH INFORMATION MANAGEMENT | Facility: CLINIC | Age: 52
End: 2017-12-12

## 2017-12-12 DIAGNOSIS — F51.01 PRIMARY INSOMNIA: ICD-10-CM

## 2017-12-12 NOTE — TELEPHONE ENCOUNTER
Received PA request for Narcan for CMM. Form completed and submitted. Key [id number].  Dalila Zabala LPN

## 2017-12-13 ENCOUNTER — OFFICE VISIT (OUTPATIENT)
Dept: PEDIATRICS | Facility: CLINIC | Age: 52
End: 2017-12-13
Payer: MEDICARE

## 2017-12-13 VITALS
HEIGHT: 61 IN | SYSTOLIC BLOOD PRESSURE: 74 MMHG | HEART RATE: 75 BPM | DIASTOLIC BLOOD PRESSURE: 50 MMHG | WEIGHT: 165.9 LBS | OXYGEN SATURATION: 91 % | BODY MASS INDEX: 31.32 KG/M2 | TEMPERATURE: 98.4 F

## 2017-12-13 DIAGNOSIS — R00.1 BRADYCARDIA: Primary | ICD-10-CM

## 2017-12-13 DIAGNOSIS — R40.0 SOMNOLENCE: ICD-10-CM

## 2017-12-13 DIAGNOSIS — R33.9 URINARY RETENTION: ICD-10-CM

## 2017-12-13 DIAGNOSIS — G89.4 CHRONIC PAIN SYNDROME: ICD-10-CM

## 2017-12-13 DIAGNOSIS — I95.9 HYPOTENSION, UNSPECIFIED HYPOTENSION TYPE: ICD-10-CM

## 2017-12-13 PROCEDURE — 99215 OFFICE O/P EST HI 40 MIN: CPT | Performed by: INTERNAL MEDICINE

## 2017-12-13 RX ORDER — NALOXONE HYDROCHLORIDE 0.4 MG/ML
.1-.4 INJECTION, SOLUTION INTRAMUSCULAR; INTRAVENOUS; SUBCUTANEOUS ONCE
Qty: 1 ML | Refills: 3 | Status: SHIPPED | OUTPATIENT
Start: 2017-12-13 | End: 2017-12-13

## 2017-12-13 RX ORDER — TRAZODONE HYDROCHLORIDE 150 MG/1
TABLET ORAL
Qty: 30 TABLET | Refills: 3 | Status: SHIPPED | OUTPATIENT
Start: 2017-12-13 | End: 2018-04-12

## 2017-12-13 RX ORDER — TRAZODONE HYDROCHLORIDE 50 MG/1
TABLET, FILM COATED ORAL
Qty: 30 TABLET | Refills: 3 | Status: SHIPPED | OUTPATIENT
Start: 2017-12-13 | End: 2018-04-12

## 2017-12-13 NOTE — TELEPHONE ENCOUNTER
Called patient with new script info. She stated she will pick it up if she thinks she will need it.  Dalila Zabala LPN

## 2017-12-13 NOTE — TELEPHONE ENCOUNTER
Received notice from [employer]. Nasal narcan not covered, but injectable forms are covered. Will change prescription. Please let know.    Halle De Los Santos MD  Internal Medicine/Pediatrics

## 2017-12-13 NOTE — PATIENT INSTRUCTIONS
1. Schedule with Cardiology - check with insurance to see if covered at [facility] - make sure they send me records  - if not covered, can put in referral to Cardiology down at [facility] or at [place]  2. If Cardiology does not feel symptoms due to the heart, would recommend sleep clinic evaluation as next step.   3. If you want to do medical marijuana, recommend get heart things figured out first and then talk with Dr. Beyer  4. Come in for urine  5. If no infection, would recommend see Urology - let me know when you are ready to do ron

## 2017-12-13 NOTE — MR AVS SNAPSHOT
After Visit Summary   12/13/2017    Cristela Smith    MRN: [id number]           Patient Information     Date Of Birth          1965        Visit Information        Provider Department      12/13/2017 10:20 AM Halle De Los Santos MD [facility]        Today's Diagnoses     Bradycardia    -  1    Hypotension, unspecified hypotension type        Somnolence        Urinary retention          Care Instructions    1. Schedule with Cardiology - check with insurance to see if covered at [facility] - make sure they send me records  - if not covered, can put in referral to Cardiology down at [facility] or at [place]  2. If Cardiology does not feel symptoms due to the heart, would recommend sleep clinic evaluation as next step.   3. If you want to do medical marijuana, recommend get heart things figured out first and then talk with Dr. Beyer  4. Come in for urine  5. If no infection, would recommend see Urology - let me know when you are ready to do that.           Follow-ups after your visit        Additional Services     CARDIOLOGY EVAL ADULT REFERRAL       Your provider has referred you to:  OTHER PROVIDERS:  [facility], [facility] - [phone]    Please be aware that coverage of these services is subject to the terms and limitations of your health insurance plan.  Call member services at your health plan with any benefit or coverage questions.      Type of Referral:  New Cardiology Consult    Timeframe requested:  Within 1 month    Please bring the following to your appointment:  >>   Any x-rays, CTs or MRIs which have been performed.  Contact the facility where they were done to arrange for  prior to your scheduled appointment.    >>   List of current medications  >>   This referral request   >>   Any documents/labs given to you for this referral                  Future tests that were ordered for you today     Open Future Orders  "       Priority Expected Expires Ordered    *UA reflex to Microscopic and Culture ([place] and [place] [facility] (except [place] and [place]) Routine  2018            Who to contact     If you have questions or need follow up information about today's clinic visit or your schedule please contact [facility] directly at [phone].  Normal or non-critical lab and imaging results will be communicated to you by MyChart, letter or phone within 4 business days after the clinic has received the results. If you do not hear from us within 7 days, please contact the clinic through MyChart or phone. If you have a critical or abnormal lab result, we will notify you by phone as soon as possible.  Submit refill requests through [facility] or call your pharmacy and they will forward the refill request to us. Please allow 3 business days for your refill to be completed.          Additional Information About Your Visit        City Notes Information     City Notes lets you send messages to your doctor, view your test results, renew your prescriptions, schedule appointments and more. To sign up, go to [email or web address] . Click on \"Log in\" on the left side of the screen, which will take you to the Welcome page. Then click on \"Sign up Now\" on the right side of the page.     You will be asked to enter the access code listed below, as well as some personal information. Please follow the directions to create your username and password.     Your access code is: [email or web address]  Expires: 2018  9:14 AM     Your access code will  in 90 days. If you need help or a new code, please call your [place] clinic or [phone].        Care EveryWhere ID     This is your Care EveryWhere ID. This could be used by other organizations to access your [place] medical records  [id number]        Your Vitals Were     Pulse Temperature Height Pulse Oximetry BMI (Body Mass Index)       75 98.4  F (36.9  C) (Tympanic) 5' " "0.5\" (1.537 m) 91% 31.87 kg/m2        Blood Pressure from Last 3 Encounters:   12/13/17 (!) 74/50   12/08/17 (!) 88/60   11/30/17 96/66    Weight from Last 3 Encounters:   12/13/17 165 lb 14.4 oz (75.3 kg)   12/08/17 167 lb 3.2 oz (75.8 kg)   11/30/17 170 lb 8 oz (77.3 kg)              We Performed the Following     CARDIOLOGY EVAL ADULT REFERRAL     UA reflex to Microscopic and Culture        Primary Care Provider Office Phone # Fax #    Halle De Los Santos [phone] [address] [place] [address]        Equal Access to Services     [facility]: Hadii aad ku hadasho Soomaali, waaxda luqadaha, qaybta kaalmada adeegyada, waxay armandoin haymarzena membreno . So [facility] [phone].    ATENCIÓN: Si habla español, tiene a rowland disposición servicios gratuitos de asistencia lingüística. [facility] [phone].    We comply with applicable federal civil rights laws and [place] laws. We do not discriminate on the basis of race, color, national origin, age, disability, sex, sexual orientation, or gender identity.            Thank you!     Thank you for choosing [facility]  for your care. Our goal is always to provide you with excellent care. Hearing back from our patients is one way we can continue to improve our services. Please take a few minutes to complete the written survey that you may receive in the mail after your visit with us. Thank you!             Your Updated Medication List - Protect others around you: Learn how to safely use, store and throw away your medicines at www.disposemymeds.org.          This list is accurate as of: 12/13/17 11:17 AM.  Always use your most recent med list.                   Brand Name Dispense Instructions for use Diagnosis    * albuterol 108 (90 BASE) MCG/ACT Inhaler    PROAIR HFA/PROVENTIL HFA/VENTOLIN HFA    1 Inhaler    Inhale 2 puffs into the lungs every 6 hours as needed for shortness of breath / dyspnea or wheezing    " Viral URI with cough       * albuterol (2.5 MG/3ML) 0.083% neb solution     25 vial    Take 1 vial (2.5 mg) by nebulization every 6 hours as needed for shortness of breath / dyspnea or wheezing    Mild reactive airways disease, unspecified whether persistent       atorvastatin 20 MG tablet    LIPITOR    90 tablet    Take 1 tablet (20 mg) by mouth At Bedtime    Hyperlipidemia LDL goal <130       butalbital-acetaminophen-caffeine -40 MG per tablet    FIORICET/ESGIC     Take 1-2 tablets by mouth 4 times daily as needed        clonazePAM 0.5 MG tablet    klonoPIN     Take 0.5 mg by mouth 2 times daily as needed for anxiety        cyabnocobalamin 2500 MCG sublingual tablet    VITAMIN B-12    90 tablet    Place 2,500 mcg under the tongue daily    Vitamin B12 deficiency (non anemic)       estropipate 1.5 MG tablet    OGEN    90 tablet    Take 1 tablet by mouth once daily.    Menopausal syndrome (hot flashes)       fluticasone 50 MCG/ACT spray    FLONASE    1 Bottle    Spray 1-2 sprays into both nostrils daily as needed for rhinitis or allergies    Other chronic rhinitis       hydrOXYzine 50 MG tablet    ATARAX    180 tablet    Take 2 tablets (100 mg) by mouth every 6 hours as needed for anxiety    Anxiety       hyoscyamine 0.125 MG tablet    ANASPAZ/LEVSIN    40 tablet    Take 1-2 tablets (125-250 mcg) by mouth every 4 hours as needed for cramping    Irritable bowel syndrome without diarrhea       LYRICA 100 MG capsule   Generic drug:  pregabalin     270 capsule    Take 1 capsule (100 mg) by mouth daily And 2 capsules (200 mg) at bedtime        MOBIC 15 MG tablet   Generic drug:  meloxicam     30 tablet    Take 1 tablet (15 mg) by mouth daily        MS CONTIN 15 MG 12 hr tablet   Generic drug:  morphine      Take 15 mg by mouth every 12 hours        naloxegol 25 MG Tabs tablet     30 tablet    Take 1 tablet (25 mg) by mouth every morning (before breakfast)    Drug-induced constipation       naloxone nasal spray     NARCAN    0.2 mL    Spray 1 spray (4 mg) into one nostril alternating nostrils as needed for opioid reversal every 2-3 minutes until assistance arrives    Chronic pain syndrome       Omega-3 Krill Oil 300 MG Caps      Take 300 mg by mouth daily        ondansetron 4 MG ODT tab    ZOFRAN ODT    20 tablet    Take 1 tablet (4 mg) by mouth every 6 hours as needed for nausea or vomiting    Nausea       oxyCODONE IR 5 MG tablet    ROXICODONE    18 tablet    Take 2 tablets (10 mg) by mouth every 4 hours as needed for pain        senna-docusate 8.6-50 MG per tablet    SENOKOT-S;PERICOLACE    120 tablet    Take 2 tablets by mouth 2 times daily    Other constipation       * traZODone 150 MG tablet    DESYREL    30 tablet    take 1 tablet by mouth daily  at bedtime    Primary insomnia       * traZODone 50 MG tablet    DESYREL    30 tablet    Take 1 tablet (50mg) by mouth nightly with 150 mg tablets as needed for sleep for a total of 200mg.    Primary insomnia       varenicline 0.5 MG X 11 & 1 MG X 42 tablet    CHANTIX STARTING MONTH FILIBERTO    53 tablet    Take 0.5 mg tab daily for 3 days, then 0.5 mg tab twice daily for 4 days, then 1 mg twice daily.    Tobacco abuse       vitamin D 2000 UNITS tablet     100 tablet    Take 2,000 Units by mouth daily        vitamin E 400 UNIT capsule     30 capsule    Take by mouth daily        * Notice:  This list has 4 medication(s) that are the same as other medications prescribed for you. Read the directions carefully, and ask your doctor or other care provider to review them with you.

## 2017-12-13 NOTE — NURSING NOTE
"Chief Complaint   Patient presents with     follow up on multiple issues       Initial BP (!) 74/50 (BP Location: Right arm, Patient Position: Chair, Cuff Size: Adult Regular)  Pulse 75  Temp 98.4  F (36.9  C) (Tympanic)  Ht 5' 0.5\" (1.537 m)  Wt 165 lb 14.4 oz (75.3 kg)  SpO2 91%  BMI 31.87 kg/m2 Estimated body mass index is 31.87 kg/(m^2) as calculated from the following:    Height as of this encounter: 5' 0.5\" (1.537 m).    Weight as of this encounter: 165 lb 14.4 oz (75.3 kg).  Medication Reconciliation: complete   Dalila Zabala LPN      "

## 2017-12-13 NOTE — PROGRESS NOTES
SUBJECTIVE:   Cristela Smith is a 52 year old female who presents to clinic today for the following health issues:      Patient here to review Holter Monitor results and multiple health issues    51 y/o F with h/o fibromyalgia, depression, anxiety and complex regional pain syndrome after a fall at work a few years back who is followed at the [facility] with Dr. Beyer for multiple pain medications and procedures. Has had progressive fatigue and somnolence over last several months. Extensive lab work-up has been negative. Has been having issues with procedures that HR goes down into the 40's and she has also had low blood pressure with systolic's in the 70's. Notes this happens at home as well. Has had this happen when she was much younger in the past, but not noticed recently. In fact, she has a history of hypertension and was previously on medication for this. She reports she is taking significantly less pain medication than in the past. Is prescribed MS Contin 15 mg twice daily, but typically takes only in the evening. Also has oxycodone and dilaudid prescribed, but using far less than prescribed. Last dilaudid prescription lasted almost 7 months. Dr. Beyer does not feel her symptoms are related to her pain medication. Patient comes in today to discuss results of holter monitor that she took back last week. Reports she visited her aunt in the hospital and fell asleep when in the room, which was embarrassing for her. Had holter monitor done recently and here for results. Dr. Beyer has told her that he would prefer she see a Cardiologist through [facility] if she needs to see Cardiology.    Urinary retention: Also has been having to strain to empty bladder. Only able to go about twice a day. Having leaking afterwards. No dysuria or urinary frequency. Has a history of a hysterectomy. Was told would probably need a bladder procedure in the future.    Chronic pain: continues to follow with Dr. Beyer. Has had many  "procedures. Thinks they are helpful. Feels medical marijuana might be a better option than opioids. Feels works better (when has tried marijuana when in [place]) and less side effects. Dr. Beyer unable to certify due to policy of group. Has told her she would need to come off of the opioids, which she is fine with. Wondering what my opinion is.    Reviewed and updated as needed this visit by clinical staff  Tobacco  Allergies  Meds  Problems  Med Hx  Surg Hx  Fam Hx  Soc Hx        Reviewed and updated as needed this visit by Provider  Allergies  Meds  Problems       -------------------------------------    ROS:  Constitutional, HEENT, cardiovascular, pulmonary, GI, , musculoskeletal, neuro, skin, endocrine and psych systems are negative, except as otherwise noted.    OBJECTIVE:                                                    BP (!) 74/50 (BP Location: Right arm, Patient Position: Chair, Cuff Size: Adult Regular)  Pulse 75  Temp 98.4  F (36.9  C) (Tympanic)  Ht 5' 0.5\" (1.537 m)  Wt 165 lb 14.4 oz (75.3 kg)  SpO2 91%  BMI 31.87 kg/m2   Body mass index is 31.87 kg/(m^2).  General Appearance: tired appearing, alert and no distress  Eyes:   no discharge, erythema.  Normal pupils.  Neck: Supple.  No adenopathy, no asymmetry, masses, or scars and thyroid normal to palpation  Respiratory: lungs clear to auscultation - no rales, rhonchi or wheezes.  Cardiovascular: regular rate and rhythm, normal S1 S2, no S3 or S4 and no murmur, click or rub.  No peripheral edema.  Skin: no rashes or lesions.  Well perfused and normal turgor.    Diagnostic Test Results:  holter monitor: no significant bradycardia. HR range from . No abnormalities for patient reported symptoms.     ASSESSMENT/PLAN:                                                      (R00.1) Bradycardia  (primary encounter diagnosis)  (I95.9) Hypotension, unspecified hypotension type  Comment: given history of low HR and low BP with procedures and " at times at home concerned about sick sinus symptoms even with normal 48 hour holter monitor. Certainly many of of medications could cause bradycardia and hypotension, but reports has not taken any pain medication and BP quite low today  Plan: CARDIOLOGY EVAL ADULT REFERRAL, UA reflex to         Microscopic and Culture  - will have see cardiology - patient prefers to see through [place]. Encouraged to discuss with insurance to make sure covered    (R40.0) Somnolence  Comment: concerned about sick sinus given documented bradycardia and hypotension despite normal 48 hour holter monitor. Also could have primary sleep disturbance.  Plan: CARDIOLOGY EVAL ADULT REFERRAL  - will have see cardiology as next steps  - if cardiology does not feel due to sick sinus or other cardiovascular problem, would recommend sleep eval as next steps    (R33.9) Urinary retention  Comment: suspect due to bladder dropping  Plan: *UA reflex to Microscopic and Culture ([place]         and [facility] (except [place] and         [place]),   - UA negative.   - recommend see urology as next step - would like to hold off on this for now    (G89.4) Chronic pain syndrome  Comment: with complex regional pain syndrome  Plan:   - continue to follow with Dr. Beyer  - recommend discuss medical marijuana further with him if she would like to do this  - discussed general logistics and average cost/month out of pocket that I have seen with other patients in the past.    A total of 42 minutes was spent with Cristela in clinic today, of which >50% was spent counseling or in coordination of care regarding bradycardia, hypotension, somnolence, urinary retention, chronic pain syndrome.    Follow up with Provider - 2-3 months    [facility]

## 2017-12-15 ENCOUNTER — TRANSFERRED RECORDS (OUTPATIENT)
Dept: HEALTH INFORMATION MANAGEMENT | Facility: CLINIC | Age: 52
End: 2017-12-15

## 2017-12-15 DIAGNOSIS — R33.9 URINARY RETENTION: ICD-10-CM

## 2017-12-15 LAB
ALBUMIN UR-MCNC: NEGATIVE MG/DL
APPEARANCE UR: CLEAR
BACTERIA #/AREA URNS HPF: ABNORMAL /HPF
BILIRUB UR QL STRIP: NEGATIVE
COLOR UR AUTO: YELLOW
GLUCOSE UR STRIP-MCNC: NEGATIVE MG/DL
HGB UR QL STRIP: ABNORMAL
KETONES UR STRIP-MCNC: NEGATIVE MG/DL
LEUKOCYTE ESTERASE UR QL STRIP: NEGATIVE
NITRATE UR QL: NEGATIVE
NON-SQ EPI CELLS #/AREA URNS LPF: ABNORMAL /LPF
PH UR STRIP: 7.5 PH (ref 5–7)
RBC #/AREA URNS AUTO: ABNORMAL /HPF
SOURCE: ABNORMAL
SP GR UR STRIP: 1.01 (ref 1–1.03)
UROBILINOGEN UR STRIP-ACNC: 1 EU/DL (ref 0.2–1)
WBC #/AREA URNS AUTO: ABNORMAL /HPF

## 2017-12-15 PROCEDURE — 81001 URINALYSIS AUTO W/SCOPE: CPT | Performed by: INTERNAL MEDICINE

## 2017-12-19 ENCOUNTER — TRANSFERRED RECORDS (OUTPATIENT)
Dept: HEALTH INFORMATION MANAGEMENT | Facility: CLINIC | Age: 52
End: 2017-12-19

## 2018-01-04 ENCOUNTER — TRANSFERRED RECORDS (OUTPATIENT)
Dept: HEALTH INFORMATION MANAGEMENT | Facility: CLINIC | Age: 53
End: 2018-01-04

## 2018-01-08 ENCOUNTER — TRANSFERRED RECORDS (OUTPATIENT)
Dept: HEALTH INFORMATION MANAGEMENT | Facility: CLINIC | Age: 53
End: 2018-01-08

## 2018-01-12 ENCOUNTER — TRANSFERRED RECORDS (OUTPATIENT)
Dept: HEALTH INFORMATION MANAGEMENT | Facility: CLINIC | Age: 53
End: 2018-01-12

## 2018-01-12 LAB — EJECTION FRACTION: 59

## 2018-01-23 ENCOUNTER — TRANSFERRED RECORDS (OUTPATIENT)
Dept: HEALTH INFORMATION MANAGEMENT | Facility: CLINIC | Age: 53
End: 2018-01-23

## 2018-01-31 ENCOUNTER — TRANSFERRED RECORDS (OUTPATIENT)
Dept: HEALTH INFORMATION MANAGEMENT | Facility: CLINIC | Age: 53
End: 2018-01-31

## 2018-02-27 ENCOUNTER — TRANSFERRED RECORDS (OUTPATIENT)
Dept: HEALTH INFORMATION MANAGEMENT | Facility: CLINIC | Age: 53
End: 2018-02-27

## 2018-03-20 ENCOUNTER — TRANSFERRED RECORDS (OUTPATIENT)
Dept: HEALTH INFORMATION MANAGEMENT | Facility: CLINIC | Age: 53
End: 2018-03-20

## 2018-03-27 PROBLEM — J45.909 MILD REACTIVE AIRWAYS DISEASE, UNSPECIFIED WHETHER PERSISTENT: Status: ACTIVE | Noted: 2018-03-27

## 2018-03-27 PROBLEM — J45.909 MILD REACTIVE AIRWAYS DISEASE, UNSPECIFIED WHETHER PERSISTENT: Status: ACTIVE | Noted: 2017-11-17

## 2018-04-03 ENCOUNTER — TRANSFERRED RECORDS (OUTPATIENT)
Dept: HEALTH INFORMATION MANAGEMENT | Facility: CLINIC | Age: 53
End: 2018-04-03

## 2018-04-12 DIAGNOSIS — F51.01 PRIMARY INSOMNIA: ICD-10-CM

## 2018-04-12 DIAGNOSIS — G89.4 CHRONIC PAIN SYNDROME: Primary | ICD-10-CM

## 2018-04-12 DIAGNOSIS — N95.1 MENOPAUSAL SYNDROME (HOT FLASHES): ICD-10-CM

## 2018-04-12 NOTE — TELEPHONE ENCOUNTER
"Lyrica 100 mg      Last Written Prescription Date:  09/13/17  Last Fill Quantity: 270,   # refills: 1  Last Office Visit: 12/13/17  Future Office visit:       Routing refill request to provider for review/approval because:  Drug not on the [facility] or [facility] refill protocol or controlled substance    Ogen  Last Written Prescription Date:  04/17/17  Last Fill Quantity: 90,  # refills: 1   Medication is being filled for 1 time refill only due to:  pt is due for follow-up in Feb or March    Trazodone 50 and 150 mg  Last Written Prescription Date:  12/13/17  Last Fill Quantity: 30,  # refills: 0   Per last office appointment note-Schedule with Cardiology - check with insurance to see if covered at [facility] - make sure they send me records  - if not covered, can put in referral to Cardiology down at [facility] or at [place]  2. If Cardiology does not feel symptoms due to the heart, would recommend sleep clinic evaluation as next step.   Notes from [facility] are from Dr. Beyer.  No Cardiology notes.  LMTCB to see if she followed up with Cardiology    Requested Prescriptions   Pending Prescriptions Disp Refills     traZODone (DESYREL) 150 MG tablet 30 tablet 3    Serotonin Modulators Passed    4/12/2018  4:23 PM       Passed - Recent (12 mo) or future (30 days) visit within the authorizing provider's specialty    Patient had office visit in the last 12 months or has a visit in the next 30 days with authorizing provider or within the authorizing provider's specialty.  See \"Patient Info\" tab in inbasket, or \"Choose Columns\" in Meds & Orders section of the refill encounter.           Passed - Patient is age 18 or older       Passed - No active pregnancy on record       Passed - No positive pregnancy test in past 12 months        traZODone (DESYREL) 50 MG tablet 30 tablet 3     Sig: Take 1 tablet (50mg) by mouth nightly with 150 mg tablets as needed for sleep for a total of 200mg.    Serotonin Modulators Passed " "   4/12/2018  4:23 PM       Passed - Recent (12 mo) or future (30 days) visit within the authorizing provider's specialty    Patient had office visit in the last 12 months or has a visit in the next 30 days with authorizing provider or within the authorizing provider's specialty.  See \"Patient Info\" tab in inbasket, or \"Choose Columns\" in Meds & Orders section of the refill encounter.           Passed - Patient is age 18 or older       Passed - No active pregnancy on record       Passed - No positive pregnancy test in past 12 months        pregabalin (LYRICA) 100 MG capsule 270 capsule 1     Sig: Take 1 capsule (100 mg) by mouth daily And 2 capsules (200 mg) at bedtime    There is no refill protocol information for this order        estropipate (OGEN) 1.5 MG tablet 90 tablet 3     Sig: Take 1 tablet by mouth once daily.    Hormone Replacement Therapy Passed    4/12/2018  4:23 PM       Passed - Blood pressure under 140/90 in past 12 months    BP Readings from Last 3 Encounters:   12/13/17 (!) 74/50   12/08/17 (!) 88/60   11/30/17 96/66                Passed - Recent (12 mo) or future (30 days) visit within the authorizing provider's specialty    Patient had office visit in the last 12 months or has a visit in the next 30 days with authorizing provider or within the authorizing provider's specialty.  See \"Patient Info\" tab in inbasket, or \"Choose Columns\" in Meds & Orders section of the refill encounter.           Passed - Patient has mammogram in past 2 years on file if age 50-75       Passed - Patient is 18 years of age or older       Passed - No active pregnancy on record       Passed - No positive pregnancy test on record in past 12 months        "

## 2018-04-13 ENCOUNTER — TELEPHONE (OUTPATIENT)
Dept: PEDIATRICS | Facility: CLINIC | Age: 53
End: 2018-04-13

## 2018-04-13 RX ORDER — PREGABALIN 100 MG/1
100 CAPSULE ORAL DAILY
Qty: 270 CAPSULE | Refills: 1 | Status: SHIPPED | OUTPATIENT
Start: 2018-04-13

## 2018-04-13 RX ORDER — TRAZODONE HYDROCHLORIDE 150 MG/1
TABLET ORAL
Qty: 30 TABLET | Refills: 3 | Status: SHIPPED | OUTPATIENT
Start: 2018-04-13

## 2018-04-13 RX ORDER — TRAZODONE HYDROCHLORIDE 50 MG/1
TABLET, FILM COATED ORAL
Qty: 30 TABLET | Refills: 3 | Status: SHIPPED | OUTPATIENT
Start: 2018-04-13 | End: 2018-08-22

## 2018-04-13 NOTE — TELEPHONE ENCOUNTER
Prior Authorization Retail Medication Request    Medication/Dose:   ICD code (if different than what is on RX):  Menopausal syndrome (hot flashes) [N95.1  Previously Tried and Failed:    Rationale:  Help Menopausal symptoms    Insurance Name:  Medicare  Insurance ID:  -A      Pharmacy Information (if different than what is on RX)  Name:  [facility] in [place] off [address]  Phone:  [phone]    Thanks,  Brittni Barrera MA 11:14 AM 4/13/2018

## 2018-04-13 NOTE — TELEPHONE ENCOUNTER
Rx faxed. Called patient and asked her to call back regarding scheduling.  Brittni Barrera MA 8:36 AM 4/13/2018

## 2018-04-13 NOTE — TELEPHONE ENCOUNTER
rx printed. Please fax lyrica. I should see her in follow-up within the next 1-2 months.    Please let know.    Thanks,  Halle De Los Santos MD  Internal Medicine/Pediatrics

## 2018-04-16 DIAGNOSIS — G89.4 CHRONIC PAIN SYNDROME: Primary | ICD-10-CM

## 2018-04-16 DIAGNOSIS — K59.03 DRUG-INDUCED CONSTIPATION: ICD-10-CM

## 2018-04-16 DIAGNOSIS — K59.09 CHRONIC CONSTIPATION: ICD-10-CM

## 2018-04-16 DIAGNOSIS — F41.9 ANXIETY: ICD-10-CM

## 2018-04-16 NOTE — TELEPHONE ENCOUNTER
Estropipate is not covered by patient's insurance. The preferred alternative is Estradiol Tab MG     Last Written Prescription Date:  04/12/2018  Last Fill Quantity: 30 tablet,   # refills: 0  Last Office Visit: 12/13/2017 Halle De Los Santos MD  Future Office visit:       Routing refill request to provider for review/approval because:  Drug not on the [facility], [facility] or [facility] refill protocol or controlled substance

## 2018-04-16 NOTE — TELEPHONE ENCOUNTER
Patient has tried estradiol in 2015 and did not cover symptoms. Previously was on Menest and changed to estropipate 2/2017 due to insurance. Are there any other options? Can she go back to the [facility]?    Thanks,  Halle De Los Santos MD  Internal Medicine/Pediatrics

## 2018-04-17 NOTE — TELEPHONE ENCOUNTER
Central Prior Authorization Team   Phone: [phone]      PA Initiation    Medication: estropipate 1.5 mg tabs  Insurance Company: [employer] - Phone [phone] Fax [phone]  Pharm[facility]illing the Rx[facility] [place] [facility] [address] [facility]  Filling Pharmacy Phone: [phone]  Filling Pharmacy Fax: [phone]  Start Date: 4/17/2018

## 2018-04-18 NOTE — TELEPHONE ENCOUNTER
Prior Authorization Approval    Authorization Effective Date: 4/12/2018  Authorization Expiration Date: Until Further Notice  Medication: estropipate 1.5 mg tabs - Approved   Approved Dose/Quantity:    Reference #: [id number]   Insurance Company: [employer] - Phone [phone] Fax [phone]  Expected CoPay: $1.25     CoPay Card Available:      Foundation Assistance Needed:    Which Pharmacy is filling the prescription (Not needed for infusion/clinic administered): [id number]  [place] - [address] AT [facility]  Pharmacy Notified: Yes  Patient Notified: Yes

## 2018-04-24 NOTE — TELEPHONE ENCOUNTER
I called patient and she said the Estropipate was completely covered.  PA was completed for this medication.    Naloxegol 25 mg  Last Written Prescription Date:  12/26/16  Last Fill Quantity: 30,  # refills: 11   Last office visit: 12/13/2017 with prescribing provider:  12/13/17   Future Office Visit:   Next 5 appointments (look out 90 days)     May 09, 2018  9:00 AM CDT   [facility]    [address] [place] [id number] [phone]                 Requested Prescriptions   Pending Prescriptions Disp Refills     naloxegol 25 MG TABS tablet 30 tablet 11     Sig: Take 1 tablet (25 mg) by mouth every morning (before breakfast)    There is no refill protocol information for this order        Routing refill request to provider for review/approval because:  A break in medication.  Reported as not taking at office appointment on 12/26/16.  Patient states that she has been taking this medication.  DAVID Cassidy RN

## 2018-05-09 ENCOUNTER — OFFICE VISIT (OUTPATIENT)
Dept: PEDIATRICS | Facility: CLINIC | Age: 53
End: 2018-05-09
Payer: MEDICARE

## 2018-05-09 VITALS
WEIGHT: 166.8 LBS | HEART RATE: 76 BPM | DIASTOLIC BLOOD PRESSURE: 70 MMHG | SYSTOLIC BLOOD PRESSURE: 110 MMHG | TEMPERATURE: 98.3 F | OXYGEN SATURATION: 97 % | HEIGHT: 61 IN | BODY MASS INDEX: 31.49 KG/M2

## 2018-05-09 DIAGNOSIS — F41.9 ANXIETY: ICD-10-CM

## 2018-05-09 DIAGNOSIS — N95.1 MENOPAUSAL SYNDROME (HOT FLASHES): ICD-10-CM

## 2018-05-09 DIAGNOSIS — F32.1 MODERATE MAJOR DEPRESSION (H): ICD-10-CM

## 2018-05-09 DIAGNOSIS — I10 HYPERTENSION GOAL BP (BLOOD PRESSURE) < 140/90: ICD-10-CM

## 2018-05-09 DIAGNOSIS — Z72.0 TOBACCO USE: Primary | ICD-10-CM

## 2018-05-09 DIAGNOSIS — Z12.31 VISIT FOR SCREENING MAMMOGRAM: ICD-10-CM

## 2018-05-09 DIAGNOSIS — R11.0 NAUSEA: ICD-10-CM

## 2018-05-09 DIAGNOSIS — K59.09 OTHER CONSTIPATION: ICD-10-CM

## 2018-05-09 PROCEDURE — 99214 OFFICE O/P EST MOD 30 MIN: CPT | Performed by: INTERNAL MEDICINE

## 2018-05-09 RX ORDER — HYDROXYZINE HYDROCHLORIDE 50 MG/1
100 TABLET, FILM COATED ORAL EVERY 6 HOURS PRN
Qty: 180 TABLET | Refills: 5 | Status: SHIPPED | OUTPATIENT
Start: 2018-05-09 | End: 2018-08-22

## 2018-05-09 RX ORDER — AMOXICILLIN 250 MG
2 CAPSULE ORAL 2 TIMES DAILY
Qty: 120 TABLET | Refills: 5 | Status: SHIPPED | OUTPATIENT
Start: 2018-05-09 | End: 2018-08-22

## 2018-05-09 RX ORDER — ONDANSETRON 4 MG/1
4 TABLET, ORALLY DISINTEGRATING ORAL EVERY 6 HOURS PRN
Qty: 20 TABLET | Refills: 5 | Status: SHIPPED | OUTPATIENT
Start: 2018-05-09 | End: 2018-11-13

## 2018-05-09 RX ORDER — BUPROPION HYDROCHLORIDE 150 MG/1
150 TABLET ORAL EVERY MORNING
Qty: 30 TABLET | Refills: 1 | Status: SHIPPED | OUTPATIENT
Start: 2018-05-09 | End: 2018-07-03

## 2018-05-09 ASSESSMENT — ANXIETY QUESTIONNAIRES
GAD7 TOTAL SCORE: 13
3. WORRYING TOO MUCH ABOUT DIFFERENT THINGS: MORE THAN HALF THE DAYS
2. NOT BEING ABLE TO STOP OR CONTROL WORRYING: MORE THAN HALF THE DAYS
1. FEELING NERVOUS, ANXIOUS, OR ON EDGE: NEARLY EVERY DAY
7. FEELING AFRAID AS IF SOMETHING AWFUL MIGHT HAPPEN: NOT AT ALL
6. BECOMING EASILY ANNOYED OR IRRITABLE: MORE THAN HALF THE DAYS
5. BEING SO RESTLESS THAT IT IS HARD TO SIT STILL: MORE THAN HALF THE DAYS
IF YOU CHECKED OFF ANY PROBLEMS ON THIS QUESTIONNAIRE, HOW DIFFICULT HAVE THESE PROBLEMS MADE IT FOR YOU TO DO YOUR WORK, TAKE CARE OF THINGS AT HOME, OR GET ALONG WITH OTHER PEOPLE: SOMEWHAT DIFFICULT

## 2018-05-09 ASSESSMENT — PATIENT HEALTH QUESTIONNAIRE - PHQ9: 5. POOR APPETITE OR OVEREATING: MORE THAN HALF THE DAYS

## 2018-05-09 NOTE — PROGRESS NOTES
SUBJECTIVE:   Cristela Smith is a 53 year old female who presents to clinic today for the following health issues:    Hypertension Follow-up      Outpatient blood pressures are being checked at home.  Results are 110/70.    Low Salt Diet: not monitoring salt    Tobacco - smoking about a pack or a little bit more a day. Would like to quit - has a grandbaby coming in August. Had side effects with chantix in the past. When had quit in the past for 13 years, had taken wellbutrin. Can't chew gum which she has done in the past. Has some mints that could use.     Menopause: still sweating a lot at night. Was off of the medication for about a month. Uses fan at night. Unsure if worse when off of the medication, but the weather was cold during that period of time. Felt that Menyani worked better, but insurance no longer covers that.    Constipation: continues on movantik. Was out of the senna/docusate and starting to get backed up again. Trying to limit pain medications. Working with Dr. Beyer to reduce.    Depression and Anxiety Follow-Up    Status since last visit: improved    Other associated symptoms:None    Complicating factors:     Significant life event: No     Current substance abuse: None    Overall, doing ok. Has had a lot of stress. Put house up for sale last week and sold in 3 days. Spent a lot of time getting the house ready. Will be moving to [place] in July. Had originally planned to move to [place], but decided not to because granddaughter is due in August. Also needs full oral restoration. Has been stressful to figure out what she should do and Dr. Beyer is concerned this could trigger her CRPS and make her symptoms worse. Is off of medication other than the hydroxyzine and clonazepam. Would like to try to stay off.    PHQ-9 12/26/2016 1/18/2017 8/4/2017   Total Score 19 17 13   Q9: Suicide Ideation Not at all Not at all Not at all     HEBER-7 SCORE 7/15/2016 12/26/2016 1/18/2017   Total Score - - -  "  Total Score 16 13 13       PHQ-9  English  PHQ-9   Any Language  HEBER-7  Suicide Assessment Five-step Evaluation and Treatment (SAFE-T)    Amount of exercise or physical activity: 6-7 days/week for an average of 45-60 minutes    Problems taking medications regularly: Yes,  problems remembering to take    Medication side effects: none    Diet: regular (no restrictions)    Reviewed and updated as needed this visit by clinical staff  Tobacco  Allergies  Meds  Problems  Med Hx  Surg Hx  Fam Hx  Soc Hx        Reviewed and updated as needed this visit by Provider  Allergies  Meds  Problems       -------------------------------------    ROS:  Constitutional, HEENT, cardiovascular, pulmonary, gi and gu systems are negative, except as otherwise noted.    OBJECTIVE:                                                    /70 (BP Location: Right arm, Patient Position: Sitting, Cuff Size: Adult Regular)  Pulse 76  Temp 98.3  F (36.8  C) (Tympanic)  Ht 5' 0.5\" (1.537 m)  Wt 166 lb 12.8 oz (75.7 kg)  SpO2 97%  BMI 32.04 kg/m2   Body mass index is 32.04 kg/(m^2).  General Appearance: healthy, alert and no distress  Eyes:   no discharge, erythema.  Normal pupils.  Respiratory: lungs clear to auscultation - no rales, rhonchi or wheezes.  Cardiovascular: regular rate and rhythm, normal S1 S2, no S3 or S4 and no murmur, click or rub.  No peripheral edema.  Skin: no rashes or lesions.  Well perfused and normal turgor.    Diagnostic Test Results:  none      ASSESSMENT/PLAN:                                                      (Z72.0) Tobacco use  (primary encounter diagnosis)  Comment: would like to quit smoking. Had side effects with chantix. Had success with zyban in past, but would prefer once a day dosing.  Plan: buPROPion (WELLBUTRIN XL) 150 MG 24 hr tablet  - recommend pick quit date and start bupropion 2 weeks prior  - discussed possible side effects including increased anxiety    (F32.1) Moderate major " depression (H)  Comment: overall doing ok. Has had increased stressors  Plan:   - continue to monitor, bupropion may also help with this    (F41.9) Anxiety  Comment: a little worse with stressors, but managing. Would prefer not to restart daily medication  Plan: hydrOXYzine (ATARAX) 50 MG tablet  - discussed bupropion may make anxiety worse. Needs to monitor for this  - continue hydroxyzine as needed, clonazepam prescribed by another provider    (K59.09) Other constipation  Comment: having increased symptoms  Plan: senna-docusate (SENOKOT-S;PERICOLACE) 8.6-50 MG        per tablet  - restart senna-docusate  - continue movantik    (R11.0) Nausea  Plan: ondansetron (ZOFRAN ODT) 4 MG ODT tab    (N95.1) Menopausal syndrome (hot flashes)  Comment: continues with symptoms, but tolerable  Plan: estropipate (OGEN) 1.5 MG tablet  - continue for now - discussed weaning off before 60    (I10) Hypertension goal BP (blood pressure) < 140/90  Comment: controlled, off of medication  Plan: continue to monitor    (Z12.31) Visit for screening mammogram  Plan: MA Screening Digital Bilateral    Follow up with Provider - 6 months     [facility]

## 2018-05-09 NOTE — PATIENT INSTRUCTIONS
1. Pick quit date - start taking wellbutrin 2 weeks prior to date  2. Refilled hydroxyzine, senna/docusate, zofran, estropipate  3. Schedule mammogram - [phone]  4. Follow-up 6 months with fasting labs, sooner if needed

## 2018-05-09 NOTE — MR AVS SNAPSHOT
After Visit Summary   5/9/2018    Cristela Smith    MRN: [id number]           Patient Information     Date Of Birth          1965        Visit Information        Provider Department      5/9/2018 9:00 AM Halle De Los Santos MD [facility]        Today's Diagnoses     Tobacco use    -  1    Anxiety        Other constipation        Nausea        Menopausal syndrome (hot flashes)        Visit for screening mammogram          Care Instructions    1. Pick quit date - start taking wellbutrin 2 weeks prior to date  2. Refilled hydroxyzine, senna/docusate, zofran, estropipate  3. Schedule mammogram - [phone]  4. Follow-up 6 months with fasting labs, sooner if needed          Follow-ups after your visit        Follow-up notes from your care team     Return in about 6 months (around 11/9/2018).      Future tests that were ordered for you today     Open Future Orders        Priority Expected Expires Ordered    MA Screening Digital Bilateral Routine  5/9/2019 5/9/2018            Who to contact     If you have questions or need follow up information about today's clinic visit or your schedule please contact [facility] directly at [phone].  Normal or non-critical lab and imaging results will be communicated to you by MyChart, letter or phone within 4 business days after the clinic has received the results. If you do not hear from us within 7 days, please contact the clinic through [employer] or phone. If you have a critical or abnormal lab result, we will notify you by phone as soon as possible.  Submit refill requests through [employer] or call your pharmacy and they will forward the refill request to us. Please allow 3 business days for your refill to be completed.          Additional Information About Your Visit        [employer] Information     [employer] lets you send messages to your doctor, view your test results, renew your prescriptions, schedule appointments and more. To sign  "up, go to [email or web address] . Click on \"Log in\" on the left side of the screen, which will take you to the Welcome page. Then click on \"Sign up Now\" on the right side of the page.     You will be asked to enter the access code listed below, as well as some personal information. Please follow the directions to create your username and password.     Your access code is: [email or web address]  Expires: 2018  9:58 AM     Your access code will  in 90 days. If you need help or a new code, please call your [place] clinic or [phone].        Care EveryWhere ID     This is your Care EveryWhere ID. This could be used by other organizations to access your [facility] medical records  [id number]        Your Vitals Were     Pulse Temperature Height Pulse Oximetry BMI (Body Mass Index)       76 98.3  F (36.8  C) (Tympanic) 5' 0.5\" (1.537 m) 97% 32.04 kg/m2        Blood Pressure from Last 3 Encounters:   18 110/70   17 (!) 74/50   17 (!) 88/60    Weight from Last 3 Encounters:   18 166 lb 12.8 oz (75.7 kg)   17 165 lb 14.4 oz (75.3 kg)   17 167 lb 3.2 oz (75.8 kg)                 Today's Medication Changes          These changes are accurate as of 18  9:58 AM.  If you have any questions, ask your nurse or doctor.               Start taking these medicines.        Dose/Directions    buPROPion 150 MG 24 hr tablet   Commonly known as:  WELLBUTRIN XL   Used for:  Tobacco use   Started by:  Halle De Los Santos MD        Dose:  150 mg   Take 1 tablet (150 mg) by mouth every morning   Quantity:  30 tablet   Refills:  1         Stop taking these medicines if you haven't already. Please contact your care team if you have questions.     varenicline 0.5 MG X 11 & 1 MG X 42 tablet   Commonly known as:  CHANTIX STARTING MONTH    Stopped by:  Halle De Los Santos MD                Where to get your medicines      These medications were sent to [id number] [place] - " [address] AT [facility]  [address] [address]     Phone:  [phone]     buPROPion 150 MG 24 hr tablet    estropipate 1.5 MG tablet    hydrOXYzine 50 MG tablet    ondansetron 4 MG ODT tab    senna-docusate 8.6-50 MG per tablet                Primary Care Provider Office Phone # Fax #    Halle De Los Santos [phone]       [facility] [place] [address]        Equal Access to Services     [facility]: Hadii aad ku hadasho Soomaali, waaxda luqadaha, qaybta kaalmada adeegyada, waxay idiin hayaan adeeg kharachase membreno . So [facility] [phone].    ATENCIÓN: Si habla español, tiene a rowland disposición servicios gratuitos de asistencia lingüística. [facility] [phone].    We comply with applicable federal civil rights laws and [place] laws. We do not discriminate on the basis of race, color, national origin, age, disability, sex, sexual orientation, or gender identity.            Thank you!     Thank you for choosing [facility]  for your care. Our goal is always to provide you with excellent care. Hearing back from our patients is one way we can continue to improve our services. Please take a few minutes to complete the written survey that you may receive in the mail after your visit with us. Thank you!             Your Updated Medication List - Protect others around you: Learn how to safely use, store and throw away your medicines at www.disposemymeds.org.          This list is accurate as of 5/9/18  9:58 AM.  Always use your most recent med list.                   Brand Name Dispense Instructions for use Diagnosis    albuterol 108 (90 Base) MCG/ACT Inhaler    PROAIR HFA/PROVENTIL HFA/VENTOLIN HFA    1 Inhaler    Inhale 2 puffs into the lungs every 6 hours as needed for shortness of breath / dyspnea or wheezing    Viral URI with cough       atorvastatin 20 MG tablet    LIPITOR    90 tablet    Take 1 tablet (20 mg) by mouth At Bedtime     Hyperlipidemia LDL goal <130       buPROPion 150 MG 24 hr tablet    WELLBUTRIN XL    30 tablet    Take 1 tablet (150 mg) by mouth every morning    Tobacco use       butalbital-acetaminophen-caffeine -40 MG per tablet    FIORICET/ESGIC     Take 1-2 tablets by mouth 4 times daily as needed        clonazePAM 0.5 MG tablet    klonoPIN     Take 0.5 mg by mouth 2 times daily as needed for anxiety        cyanocobalamin 2500 MCG sublingual tablet    VITAMIN B-12    90 tablet    Place 2,500 mcg under the tongue daily    Vitamin B12 deficiency (non anemic)       estropipate 1.5 MG tablet    OGEN    90 tablet    Take 1 tablet by mouth once daily.    Menopausal syndrome (hot flashes)       fluticasone 50 MCG/ACT spray    FLONASE    1 Bottle    Spray 1-2 sprays into both nostrils daily as needed for rhinitis or allergies    Other chronic rhinitis       hydrOXYzine 50 MG tablet    ATARAX    180 tablet    Take 2 tablets (100 mg) by mouth every 6 hours as needed for anxiety    Anxiety       hyoscyamine 0.125 MG tablet    ANASPAZ/LEVSIN    40 tablet    Take 1-2 tablets (125-250 mcg) by mouth every 4 hours as needed for cramping    Irritable bowel syndrome without diarrhea       MOBIC 15 MG tablet   Generic drug:  meloxicam     30 tablet    Take 1 tablet (15 mg) by mouth daily        MS CONTIN 15 MG 12 hr tablet   Generic drug:  morphine      Take 15 mg by mouth every 12 hours        naloxegol 25 MG Tabs tablet     30 tablet    Take 1 tablet (25 mg) by mouth every morning (before breakfast)    Drug-induced constipation, Chronic pain syndrome, Chronic constipation       naloxone 0.4 MG/ML injection    NARCAN    1 mL    Inject 0.25-1 mLs (0.1-0.4 mg) into the vein once for 1 dose    Chronic pain syndrome       Omega-3 Krill Oil 300 MG Caps      Take 300 mg by mouth daily        ondansetron 4 MG ODT tab    ZOFRAN ODT    20 tablet    Take 1 tablet (4 mg) by mouth every 6 hours as needed for nausea or vomiting    Nausea        oxyCODONE IR 5 MG tablet    ROXICODONE    18 tablet    Take 2 tablets (10 mg) by mouth every 4 hours as needed for pain        pregabalin 100 MG capsule    LYRICA    270 capsule    Take 1 capsule (100 mg) by mouth daily And 2 capsules (200 mg) at bedtime    Chronic pain syndrome       senna-docusate 8.6-50 MG per tablet    SENOKOT-S;PERICOLACE    120 tablet    Take 2 tablets by mouth 2 times daily    Other constipation       * traZODone 150 MG tablet    DESYREL    30 tablet    take 1 tablet by mouth daily  at bedtime    Primary insomnia       * traZODone 50 MG tablet    DESYREL    30 tablet    Take 1 tablet (50mg) by mouth nightly with 150 mg tablets as needed for sleep for a total of 200mg.    Primary insomnia       vitamin D 2000 units tablet     100 tablet    Take 2,000 Units by mouth daily        vitamin E 400 UNIT capsule     30 capsule    Take by mouth daily        * Notice:  This list has 2 medication(s) that are the same as other medications prescribed for you. Read the directions carefully, and ask your doctor or other care provider to review them with you.

## 2018-05-10 ASSESSMENT — ANXIETY QUESTIONNAIRES: GAD7 TOTAL SCORE: 13

## 2018-05-10 ASSESSMENT — PATIENT HEALTH QUESTIONNAIRE - PHQ9: SUM OF ALL RESPONSES TO PHQ QUESTIONS 1-9: 11

## 2018-05-14 ENCOUNTER — HOSPITAL ENCOUNTER (OUTPATIENT)
Dept: MAMMOGRAPHY | Facility: CLINIC | Age: 53
Discharge: HOME | End: 2018-05-14
Attending: INTERNAL MEDICINE | Admitting: INTERNAL MEDICINE
Payer: MEDICARE

## 2018-05-14 DIAGNOSIS — Z12.31 VISIT FOR SCREENING MAMMOGRAM: ICD-10-CM

## 2018-05-14 PROCEDURE — 77063 BREAST TOMOSYNTHESIS BI: CPT

## 2018-05-22 ENCOUNTER — TRANSFERRED RECORDS (OUTPATIENT)
Dept: HEALTH INFORMATION MANAGEMENT | Facility: CLINIC | Age: 53
End: 2018-05-22

## 2018-06-05 DIAGNOSIS — J06.9 VIRAL URI WITH COUGH: ICD-10-CM

## 2018-06-05 DIAGNOSIS — J31.0 OTHER CHRONIC RHINITIS: ICD-10-CM

## 2018-06-05 RX ORDER — FLUTICASONE PROPIONATE 50 MCG
1-2 SPRAY, SUSPENSION (ML) NASAL DAILY PRN
Qty: 1 BOTTLE | Refills: 10 | Status: SHIPPED | OUTPATIENT
Start: 2018-06-05 | End: 2020-02-25

## 2018-06-05 RX ORDER — ALBUTEROL SULFATE 90 UG/1
2 AEROSOL, METERED RESPIRATORY (INHALATION) EVERY 6 HOURS PRN
Qty: 1 INHALER | Refills: 3 | Status: SHIPPED | OUTPATIENT
Start: 2018-06-05 | End: 2019-04-02

## 2018-06-05 NOTE — TELEPHONE ENCOUNTER
"  Requested Prescriptions   Pending Prescriptions Disp Refills     albuterol (PROAIR HFA/PROVENTIL HFA/VENTOLIN HFA) 108 (90 Base) MCG/ACT Inhaler 1 Inhaler 0     Sig: Inhale 2 puffs into the lungs every 6 hours as needed for shortness of breath / dyspnea or wheezing    Asthma Maintenance Inhalers - Anticholinergics Passed    6/5/2018  3:24 PM       Passed - Patient is age 12 years or older       Passed - Recent (12 mo) or future (30 days) visit within the authorizing provider's specialty    Patient had office visit in the last 12 months or has a visit in the next 30 days with authorizing provider or within the authorizing provider's specialty.  See \"Patient Info\" tab in inbasket, or \"Choose Columns\" in Meds & Orders section of the refill encounter.            fluticasone (FLONASE) 50 MCG/ACT spray 1 Bottle 3     Sig: Spray 1-2 sprays into both nostrils daily as needed for rhinitis or allergies    Inhaled Steroids Protocol Passed    6/5/2018  3:24 PM       Passed - Patient is age 12 or older       Passed - Recent (12 mo) or future (30 days) visit within the authorizing provider's specialty    Patient had office visit in the last 12 months or has a visit in the next 30 days with authorizing provider or within the authorizing provider's specialty.  See \"Patient Info\" tab in inbasket, or \"Choose Columns\" in Meds & Orders section of the refill encounter.              "

## 2018-06-12 DIAGNOSIS — E78.5 HYPERLIPIDEMIA LDL GOAL <130: ICD-10-CM

## 2018-06-12 RX ORDER — ATORVASTATIN CALCIUM 20 MG/1
20 TABLET, FILM COATED ORAL AT BEDTIME
Qty: 90 TABLET | Refills: 3 | Status: SHIPPED | OUTPATIENT
Start: 2018-06-12 | End: 2019-05-15

## 2018-06-12 NOTE — TELEPHONE ENCOUNTER
"Requested Prescriptions   Pending Prescriptions Disp Refills     atorvastatin (LIPITOR) 20 MG tablet    Last Written Prescription Date:  10/4/2017  Last Fill Quantity: 90,  # refills: 3   Last office visit: 5/9/2018 with prescribing provider:  Halle De Los Santos     Future Office Visit:     90 tablet 3     Sig: Take 1 tablet (20 mg) by mouth At Bedtime    Statins Protocol Passed    6/12/2018 10:08 AM       Passed - LDL on file in past 12 months    Recent Labs   Lab Test  10/04/17   0745   LDL  45            Passed - No abnormal creatine kinase in past 12 months    Recent Labs   Lab Test  11/30/17   1714   CKT  158               Passed - Recent (12 mo) or future (30 days) visit within the authorizing provider's specialty    Patient had office visit in the last 12 months or has a visit in the next 30 days with authorizing provider or within the authorizing provider's specialty.  See \"Patient Info\" tab in inbasket, or \"Choose Columns\" in Meds & Orders section of the refill encounter.           Passed - Patient is age 18 or older       Passed - No active pregnancy on record       Passed - No positive pregnancy test in past 12 months          "

## 2018-06-26 ENCOUNTER — TRANSFERRED RECORDS (OUTPATIENT)
Dept: HEALTH INFORMATION MANAGEMENT | Facility: CLINIC | Age: 53
End: 2018-06-26

## 2018-06-27 ENCOUNTER — TELEPHONE (OUTPATIENT)
Dept: PEDIATRICS | Facility: CLINIC | Age: 53
End: 2018-06-27
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
## 2018-06-27 NOTE — TELEPHONE ENCOUNTER
"Patient was working in the garden pulling weeds, and was bitten by multiple red ants.  They crawled up her legs and arms.  Has multiple bites on her arms.  Took a shower immediately and applied vinegar to reduce the stinging.  Notes red wheals around the bites and describes a \"Stinging pain\"  Notes some swelling at her wrists and knees.  No swelling of face.  No difficulty breathing or swallowing.  Does feel nauseous.  Thinks her heart is beating a little faster.  Advised patient that she should be seen due to the number of bites she had and symptoms she is experiencing.  If she notes shortness of breath, swelling of her throat, wheezing, will need to call 911.  Is going to take Benadryl 50 mg.  Advised her not to take the Hydroxyzine with this.  Patient voices understanding of instructions, and is in agreement with seeking medical care.  No further questions.  Will call back if any other questions or concerns.  DAVID Cassidy RN    She took two Benadryl last night and Ibuprofen, and was able to sleep.  Patient saw Dr. Beyer today, and he recommended patient get an RX for Epipen.  Patient reports that Dr. Beyer told her she had at least 70 bites over her body.  He recommended that she continue the Benadryl today and to hold the Hydroxyzine while taking the Benadryl  Swelling at site of bug bites has decreased.  No shortness of breath or swelling of throat.  Advised patient no gardening until swelling resolves and she has an Epipen.    Order pended for Epipen.  DAVID Cassidy RN    "

## 2018-06-28 ENCOUNTER — TRANSFERRED RECORDS (OUTPATIENT)
Dept: HEALTH INFORMATION MANAGEMENT | Facility: CLINIC | Age: 53
End: 2018-06-28

## 2018-07-03 DIAGNOSIS — Z72.0 TOBACCO USE: ICD-10-CM

## 2018-07-03 NOTE — TELEPHONE ENCOUNTER
"Requested Prescriptions   Pending Prescriptions Disp Refills     buPROPion (WELLBUTRIN XL) 150 MG 24 hr tablet [Pharmacy Med Name: BUPROPION XL 150MG TABLETS (24 H)]    Last Written Prescription Date:  5/9/2018  Last Fill Quantity: 30,  # refills: 1   Last office visit: 5/9/2018 with prescribing provider:  Halle De Los Santos     Future Office Visit:     30 tablet 0     Sig: TAKE 1 TABLET(150 MG) BY MOUTH EVERY MORNING    SSRIs Protocol Passed    7/3/2018  9:37 AM       Passed - Recent (12 mo) or future (30 days) visit within the authorizing provider's specialty    Patient had office visit in the last 12 months or has a visit in the next 30 days with authorizing provider or within the authorizing provider's specialty.  See \"Patient Info\" tab in inbasket, or \"Choose Columns\" in Meds & Orders section of the refill encounter.           Passed - Medication is Bupropion    If the medication is Bupropion (Wellbutrin), and the patient is taking for smoking cessation; OK to refill.         Passed - Patient is age 18 or older       Passed - No active pregnancy on record       Passed - No positive pregnancy test in last 12 months          "

## 2018-07-05 RX ORDER — EPINEPHRINE 0.3 MG/.3ML
0.3 INJECTION SUBCUTANEOUS PRN
Qty: 0.6 ML | Refills: 1 | Status: SHIPPED | OUTPATIENT
Start: 2018-07-05

## 2018-07-06 RX ORDER — BUPROPION HYDROCHLORIDE 150 MG/1
TABLET ORAL
Qty: 30 TABLET | Refills: 0 | Status: SHIPPED | OUTPATIENT
Start: 2018-07-06 | End: 2018-09-11

## 2018-07-06 NOTE — TELEPHONE ENCOUNTER
Prescription approved per [facility] Refill Protocol.    Karoline Gil RN

## 2018-07-10 ENCOUNTER — TRANSFERRED RECORDS (OUTPATIENT)
Dept: HEALTH INFORMATION MANAGEMENT | Facility: CLINIC | Age: 53
End: 2018-07-10

## 2018-07-18 ENCOUNTER — TRANSFERRED RECORDS (OUTPATIENT)
Dept: HEALTH INFORMATION MANAGEMENT | Facility: CLINIC | Age: 53
End: 2018-07-18

## 2018-08-14 ENCOUNTER — TRANSFERRED RECORDS (OUTPATIENT)
Dept: HEALTH INFORMATION MANAGEMENT | Facility: CLINIC | Age: 53
End: 2018-08-14

## 2018-08-20 ENCOUNTER — TELEPHONE (OUTPATIENT)
Dept: PEDIATRICS | Facility: CLINIC | Age: 53
End: 2018-08-20

## 2018-08-20 ENCOUNTER — HOSPITAL ENCOUNTER (EMERGENCY)
Facility: CLINIC | Age: 53
Discharge: HOME | End: 2018-08-20
Attending: EMERGENCY MEDICINE | Admitting: EMERGENCY MEDICINE
Payer: MEDICARE

## 2018-08-20 VITALS
HEART RATE: 67 BPM | DIASTOLIC BLOOD PRESSURE: 78 MMHG | RESPIRATION RATE: 20 BRPM | SYSTOLIC BLOOD PRESSURE: 128 MMHG | OXYGEN SATURATION: 100 % | TEMPERATURE: 97.8 F

## 2018-08-20 DIAGNOSIS — R11.2 NAUSEA AND VOMITING, INTRACTABILITY OF VOMITING NOT SPECIFIED, UNSPECIFIED VOMITING TYPE: ICD-10-CM

## 2018-08-20 LAB
ALBUMIN SERPL-MCNC: 3.6 G/DL (ref 3.4–5)
ALBUMIN UR-MCNC: NEGATIVE MG/DL
ALP SERPL-CCNC: 104 U/L (ref 40–150)
ALT SERPL W P-5'-P-CCNC: 30 U/L (ref 0–50)
ANION GAP SERPL CALCULATED.3IONS-SCNC: 2 MMOL/L (ref 3–14)
APPEARANCE UR: CLEAR
AST SERPL W P-5'-P-CCNC: 21 U/L (ref 0–45)
BACTERIA #/AREA URNS HPF: ABNORMAL /HPF
BASOPHILS # BLD AUTO: 0 10E9/L (ref 0–0.2)
BASOPHILS NFR BLD AUTO: 0.6 %
BILIRUB SERPL-MCNC: 0.4 MG/DL (ref 0.2–1.3)
BILIRUB UR QL STRIP: NEGATIVE
BUN SERPL-MCNC: 8 MG/DL (ref 7–30)
CALCIUM SERPL-MCNC: 8.6 MG/DL (ref 8.5–10.1)
CHLORIDE SERPL-SCNC: 108 MMOL/L (ref 94–109)
CO2 SERPL-SCNC: 31 MMOL/L (ref 20–32)
COLOR UR AUTO: YELLOW
CREAT SERPL-MCNC: 0.93 MG/DL (ref 0.52–1.04)
DIFFERENTIAL METHOD BLD: NORMAL
EOSINOPHIL # BLD AUTO: 0.1 10E9/L (ref 0–0.7)
EOSINOPHIL NFR BLD AUTO: 1.6 %
ERYTHROCYTE [DISTWIDTH] IN BLOOD BY AUTOMATED COUNT: 13.8 % (ref 10–15)
GFR SERPL CREATININE-BSD FRML MDRD: 64 ML/MIN/1.7M2
GLUCOSE SERPL-MCNC: 89 MG/DL (ref 70–99)
GLUCOSE UR STRIP-MCNC: NEGATIVE MG/DL
HCT VFR BLD AUTO: 38.9 % (ref 35–47)
HGB BLD-MCNC: 12.6 G/DL (ref 11.7–15.7)
HGB UR QL STRIP: NEGATIVE
IMM GRANULOCYTES # BLD: 0 10E9/L (ref 0–0.4)
IMM GRANULOCYTES NFR BLD: 0.2 %
KETONES UR STRIP-MCNC: NEGATIVE MG/DL
LEUKOCYTE ESTERASE UR QL STRIP: NEGATIVE
LYMPHOCYTES # BLD AUTO: 3 10E9/L (ref 0.8–5.3)
LYMPHOCYTES NFR BLD AUTO: 46.5 %
MCH RBC QN AUTO: 28.9 PG (ref 26.5–33)
MCHC RBC AUTO-ENTMCNC: 32.4 G/DL (ref 31.5–36.5)
MCV RBC AUTO: 89 FL (ref 78–100)
MONOCYTES # BLD AUTO: 0.7 10E9/L (ref 0–1.3)
MONOCYTES NFR BLD AUTO: 10.3 %
MUCOUS THREADS #/AREA URNS LPF: PRESENT /LPF
NEUTROPHILS # BLD AUTO: 2.6 10E9/L (ref 1.6–8.3)
NEUTROPHILS NFR BLD AUTO: 40.8 %
NITRATE UR QL: NEGATIVE
NRBC # BLD AUTO: 0 10*3/UL
NRBC BLD AUTO-RTO: 0 /100
PH UR STRIP: 6 PH (ref 5–7)
PLATELET # BLD AUTO: 237 10E9/L (ref 150–450)
POTASSIUM SERPL-SCNC: 3.8 MMOL/L (ref 3.4–5.3)
PROT SERPL-MCNC: 7 G/DL (ref 6.8–8.8)
RBC # BLD AUTO: 4.36 10E12/L (ref 3.8–5.2)
RBC #/AREA URNS AUTO: <1 /HPF (ref 0–2)
SODIUM SERPL-SCNC: 141 MMOL/L (ref 133–144)
SOURCE: ABNORMAL
SP GR UR STRIP: 1.01 (ref 1–1.03)
SQUAMOUS #/AREA URNS AUTO: <1 /HPF (ref 0–1)
UROBILINOGEN UR STRIP-MCNC: 0 MG/DL (ref 0–2)
WBC # BLD AUTO: 6.4 10E9/L (ref 4–11)
WBC #/AREA URNS AUTO: 1 /HPF (ref 0–5)

## 2018-08-20 PROCEDURE — 96361 HYDRATE IV INFUSION ADD-ON: CPT

## 2018-08-20 PROCEDURE — 96374 THER/PROPH/DIAG INJ IV PUSH: CPT

## 2018-08-20 PROCEDURE — 80053 COMPREHEN METABOLIC PANEL: CPT | Performed by: EMERGENCY MEDICINE

## 2018-08-20 PROCEDURE — 85025 COMPLETE CBC W/AUTO DIFF WBC: CPT | Performed by: EMERGENCY MEDICINE

## 2018-08-20 PROCEDURE — 25000128 H RX IP 250 OP 636: Performed by: EMERGENCY MEDICINE

## 2018-08-20 PROCEDURE — 99284 EMERGENCY DEPT VISIT MOD MDM: CPT | Mod: 25

## 2018-08-20 PROCEDURE — 81001 URINALYSIS AUTO W/SCOPE: CPT | Performed by: EMERGENCY MEDICINE

## 2018-08-20 RX ORDER — LIDOCAINE 40 MG/G
CREAM TOPICAL
Status: DISCONTINUED | OUTPATIENT
Start: 2018-08-20 | End: 2018-08-20 | Stop reason: HOSPADM

## 2018-08-20 RX ORDER — SODIUM CHLORIDE 9 MG/ML
1000 INJECTION, SOLUTION INTRAVENOUS CONTINUOUS
Status: DISCONTINUED | OUTPATIENT
Start: 2018-08-20 | End: 2018-08-20 | Stop reason: HOSPADM

## 2018-08-20 RX ORDER — ONDANSETRON 2 MG/ML
4 INJECTION INTRAMUSCULAR; INTRAVENOUS
Status: COMPLETED | OUTPATIENT
Start: 2018-08-20 | End: 2018-08-20

## 2018-08-20 RX ADMIN — SODIUM CHLORIDE 1000 ML: 9 INJECTION, SOLUTION INTRAVENOUS at 17:43

## 2018-08-20 RX ADMIN — ONDANSETRON 4 MG: 2 INJECTION INTRAMUSCULAR; INTRAVENOUS at 17:43

## 2018-08-20 ASSESSMENT — ENCOUNTER SYMPTOMS
ABDOMINAL PAIN: 1
HEMATURIA: 0
WEAKNESS: 1
VOMITING: 1
APPETITE CHANGE: 1
DYSURIA: 0
CHILLS: 1
DIARRHEA: 1
BACK PAIN: 1

## 2018-08-20 NOTE — ED PROVIDER NOTES
"  History     Chief Complaint:  Vomiting and diarrhea      The history is provided by the patient.      Cristela Smith is a 53 year old female with a history of hyperlipidemia, hypertension, and chronic pain syndrome who presents to the emergency department for evaluation of vomiting. Of note, a couple months ago the patient was bitten by red ants to which she then became allergic and she hasn't felt baseline since then. Here, she reports that for the past two weeks she has had intermittent diarrhea and since early last week she started vomiting in the middle of the night. She also has had abdominal pain that radiates into her back and decreased urine output. Due to her CRPS, she sees Dr. Beyer at [facility] and states that a few days ago after a routine procedure she had vomiting for three days straight and was so weak she could not get up and wasn't able to eat. Yesterday, she was at a Wedding and after she drank some water she had immediate diarrhea and vomiting. She also states she feels \"unbalanced\" and has chills. Today, she called her doctor who was unable to see her until the end of the week and is concerned for a chemical imbalance so she recommended she come to the ED. The patient denies dysuria, hematuria, recent foreign travel, antibiotic use, or being around others that are ill.     Allergies:  Aspirin  Sulfa drugs  Topamax  Toprol XL     Medications:    Albuterol  Lipitor  Wellbutrin  Fiorcet   Klonopin  Epinephrine  Ogen  Flonase  Atarax  Mobic  Contin  Naloxegol  Narcan  Zofran  Roxicodone  Lyrica  Senna-docusate  Desyrel     Past Medical History:    Allergic state  Anxiety  Cervical cancer  Complex regional pain syndrome affecting both upper arms  Hyperlipidemia  Hypertension  Moderate major depression  Narcotic abuse in remission   Post concussive syndrome  Cervicalgia   Mild reactive airway disease   SLAP tear of shoulder  Simple chronic bronchitis  Colon polyp    Past Surgical History:  "   Cholecystectomy  Hysterectomy  L5-S1 laminectomy     Family History:    Depression  Diabetes  Heart disease: mother, maternal and paternal uncle  Cerebrovascular disease  Skin cancer  Brain cancer    Social History:  PCP at [facility]  Current every day smoker: 1 ppd  Negative for alcohol use.  Marital Status:        Review of Systems   Constitutional: Positive for appetite change and chills.   Gastrointestinal: Positive for abdominal pain, diarrhea and vomiting.   Genitourinary: Positive for decreased urine volume. Negative for dysuria and hematuria.   Musculoskeletal: Positive for back pain.   Neurological: Positive for weakness.   All other systems reviewed and are negative.    Physical Exam   First Vitals:  BP: 112/85  Pulse: 67  Temp: 97.8  F (36.6  C)  Resp: 20  SpO2: 96 %      Physical Exam   General: Patient is alert and cooperative.  HENT:  Normal nose, oropharynx. Moist oral mucosa.  Eyes: EOMI. Normal conjunctiva.  Neck:  Normal range of motion and appearance.   Cardiovascular:  Normal rate, regular rhythm and normal heart sounds.   Pulmonary/Chest:  Effort normal. No wheezing or crackles.  Abdominal: Soft. No distension or tenderness.     Musculoskeletal: Normal range of motion. No edema or tenderness.   Neurological: oriented, normal strength, sensation, and coordination.   Skin: Warm and dry. No rash or bruising.   Psychiatric: Normal mood and affect. Normal behavior and judgement.    Emergency Department Course   Laboratory:  CBC: WBC: 6.4, HGB: 12.6, PLT: 237  CMP: Anion Gap: 2 (L), o/w WNL (Creatinine: 0.93)    UA with reflex to culture: Clear yellow urine, bacteria: few, mucous: present, otherwise WNL    Interventions:  1743 0.9% Sodium Chloride BOLUS 1000 mLs IV   1743 Zofran 4 mg IV    Emergency Department Course:  1641 Nursing notes and vitals reviewed. I performed an exam of the patient as documented above.     IV inserted. Medicine administered as documented above. Blood drawn.  This was sent to the lab for further testing, results above.    The patient provided a urine sample here in the emergency department. This was sent for laboratory testing, findings above.      1829 I rechecked the patient and discussed the results of her workup thus far.     Findings and plan explained to the Patient. Patient discharged home with instructions regarding supportive care, medications, and reasons to return. The importance of close follow-up was reviewed.     I personally reviewed the laboratory results with the Patient and answered all related questions prior to discharge.     Impression & Plan    Medical Decision Making:  This is an afebrile 53-year-old female with acute nonbloody nonbilious vomiting and nonbloody diarrhea.  There is no recent foreign travel history or ill contacts.  She is well appearing with a benign nontender abdominal examination.  Laboratory testing shows a normal CBC and CMP.  She was hydrated with normal saline and medicated with an antiemetic and symptoms have improved.  She is tolerating p.o. now.  There is no indication for imaging as there is a low suspicion for a significant intra-abdominal inflammatory process.  I suspect that this is a self-limited gastroenteritis and treatment is supportive going forward.  I recommended that she follow-up with her clinic if symptoms are not gradually improving.    Diagnosis:    ICD-10-CM    1. Nausea and vomiting, intractability of vomiting not specified, unspecified vomiting type R11.2        Disposition:  discharged to home    Scribe Disclosure:  I, Phu Nolan, am serving as a scribe on 8/20/2018 at 4:41 PM to personally document services performed by Dr. Yifan MD based on my observations and the provider's statements to me.     Phu Nolan  8/20/2018   [facility] EMERGENCY DEPARTMENT       Shyam Saha MD  08/21/18 [id number]

## 2018-08-20 NOTE — TELEPHONE ENCOUNTER
"Patient has moved to [place].  Saw Dr. Beyer on 08/14 for migraine that she had for six days.  He did trigger point injections.  On Tuesday or Wed night, developed emesis for 2 days.  Last emesis Thursday am.  Last night developed watery diarrhea.  Today stool is more like \"putty\".  Today feels nauseous  Decreased appetite.  Generalized abdominal discomfort, in the area of the umbilicus and radiates to her back.  Has had abdominal pain since the vomiting started.  After emesis resolved, felt weak, and dizzy.  Urine is very concentrated, even though she is still drinking a lot of water.  Has noted that she feels feverish and then cold.    Patient reports history of appendectomy and cholecystectomy.  Advised patient she should be seen today for evaluation.   Concern about ongoing abdominal discomfort, and dizziness.   Patient has had low Sodium in the past.  Concerned electrolytes may be off due to the vomiting and diarrhea.  Patient refuses to be seen at another clinic.  Refuses to go to [facility] ER  Patient is shopping while speaking with me-was at a pharmacy and then was driving to a post office.  Advised she should not be driving if she is feeling dizzy.  DAVID Cassidy RN    Discussed with Dr. De Los Santos-she also advises being seen today at ER.  May scheduled patient Wed afternoon, if she refuses the ER and if she agrees to go to the ER if symptoms worsen.  [facility]  DAVID Cassidy RN    "

## 2018-08-20 NOTE — ED AVS SNAPSHOT
[facility] Emergency Department    [address] [place] [address]    Phone:  [phone]    Fax:  [phone]                                       Cristela Smith   MRN: [id number]    Department:  [facility] Emergency Department   Date of Visit:  8/20/2018           Patient Information     Date Of Birth          1965        Your diagnoses for this visit were:     Nausea and vomiting, intractability of vomiting not specified, unspecified vomiting type        You were seen by Shyam Saha MD.      Follow-up Information     Follow up with Halle De Los Santos MD.    Specialties:  Internal Medicine, Pediatrics    Why:  As needed, for re-evaluation of your symptoms if not improving    Contact information:    [facility] [place] [address]  [phone]          Discharge Instructions       Discharge Instructions  Vomiting    You have been seen today for vomiting (throwing up). This is usually caused by a virus, but some bacteria, parasites, medicines or other medical conditions can cause similar symptoms. At this time your provider does not find that your vomiting is a sign of anything dangerous or life-threatening. However, sometimes the signs of serious illness do not show up right away. If you have new or worse symptoms, you may need to be seen again in the Emergency Department or by your primary provider. Remember that serious problems like appendicitis can start as vomiting.    Generally, every Emergency Department visit should have a follow-up clinic visit with either a primary or a specialty clinic/provider. Please follow-up as instructed by your emergency provider today.    Return to the Emergency Department if:    You keep vomiting and you are not able to keep liquids down.     You feel you are getting dehydrated, such as being very thirsty, not urinating (peeing) at least every 8-12 hours, or feeling faint or lightheaded.     You  develop a new fever, or your fever continues for more than 2 days.     You have abdominal (belly pain) that seems worse than cramps, is in one spot, or is getting worse over time. Appendicitis usually causes pain in the right lower abdomen (to the right and below your belly button) so watch for pain in this location.    You have blood in your vomit or stools.     You feel very weak.    You are not starting to improve within 24 hours of your visit here.     What can I do to help myself?    The most important thing to do is to drink clear liquids. If you have been vomiting a lot, it is best to have only small, frequent sips of liquids. Drinking too much at once may cause more vomiting. If you are vomiting often, you must replace minerals, sodium and potassium lost with your illness. Pedialyte  is the best available rehydration liquid but some find that it doesn t taste good so sports drinks are an alterative. You can also drink clear liquids such as water, weak tea, apple juice, and 7-Up . Avoid acid liquids (orange), caffeine (coffee) or alcohol. Do not drink milk until you no longer have diarrhea (loose stools).     After liquids are staying down, you may start eating mild foods. Soda crackers, toast, plain noodles, gelatin, applesauce and bananas are good first choices. Avoid foods that have acid, are spicy, fatty or have a lot of fiber (such as meats, coarse grains, vegetables). You may start eating these foods again in about 3 days when you are better.     Sometimes treatment includes prescription medicine to prevent nausea (sick to your stomach) and vomiting. If your provider prescribes these for you, take them as directed.     Do not take ibuprofen, naproxen, or other nonsteroidal anti-inflammatory (NSAID) medicines without checking with your healthcare provider.     If you were given a prescription for medicine here today, be sure to read all of the information (including the package insert) that comes with  your prescription.  This will include important information about the medicine, its side effects, and any warnings that you need to know about.  The pharmacist who fills the prescription can provide more information and answer questions you may have about the medicine.  If you have questions or concerns that the pharmacist cannot address, please call or return to the Emergency Department.     Remember that you can always come back to the Emergency Department if you are not able to see your regular provider in the amount of time listed above, if you get any new symptoms, or if there is anything that worries you.      Your next 10 appointments already scheduled     Aug 22, 2018  2:40 PM CDT   SHORT with Halle De Los Santos MD   [facility] ([facility])    [address] [place] [address] [address]  [facility] [address] [phone]              24 Hour Appointment Hotline       To make an appointment at any [facility], call [phone] ([phone]). If you don't have a family doctor or clinic, we will help you find one. [facility] are conveniently located to serve the needs of you and your family.             Review of your medicines      Our records show that you are taking the medicines listed below. If these are incorrect, please call your family doctor or clinic.        Dose / Directions Last dose taken    albuterol 108 (90 Base) MCG/ACT inhaler   Commonly known as:  PROAIR HFA/PROVENTIL HFA/VENTOLIN HFA   Dose:  2 puff   Quantity:  1 Inhaler        Inhale 2 puffs into the lungs every 6 hours as needed for shortness of breath / dyspnea or wheezing   Refills:  3        atorvastatin 20 MG tablet   Commonly known as:  LIPITOR   Dose:  20 mg   Quantity:  90 tablet        Take 1 tablet (20 mg) by mouth At Bedtime   Refills:  3        buPROPion 150 MG 24 hr tablet   Commonly known as:  WELLBUTRIN XL   Quantity:  30 tablet        TAKE 1 TABLET(150 MG) BY MOUTH EVERY MORNING    Refills:  0        butalbital-acetaminophen-caffeine -40 MG per tablet   Commonly known as:  FIORICET/ESGIC   Dose:  1-2 tablet        Take 1-2 tablets by mouth 4 times daily as needed   Refills:  0        clonazePAM 0.5 MG tablet   Commonly known as:  klonoPIN   Dose:  0.5 mg        Take 0.5 mg by mouth 2 times daily as needed for anxiety   Refills:  0        cyanocobalamin 2500 MCG sublingual tablet   Commonly known as:  VITAMIN B-12   Dose:  2500 mcg   Quantity:  90 tablet        Place 2,500 mcg under the tongue daily   Refills:  2        EPINEPHrine 0.3 MG/0.3ML injection 2-pack   Commonly known as:  EPIPEN/ADRENACLICK/or ANY BX GENERIC EQUIV   Dose:  0.3 mg   Quantity:  0.6 mL        Inject 0.3 mLs (0.3 mg) into the muscle as needed for anaphylaxis   Refills:  1        estropipate 1.5 MG tablet   Commonly known as:  OGEN   Quantity:  90 tablet        Take 1 tablet by mouth once daily.   Refills:  3        fluticasone 50 MCG/ACT spray   Commonly known as:  FLONASE   Dose:  1-2 spray   Quantity:  1 Bottle        Spray 1-2 sprays into both nostrils daily as needed for rhinitis or allergies   Refills:  10        hydrOXYzine 50 MG tablet   Commonly known as:  ATARAX   Dose:  100 mg   Quantity:  180 tablet        Take 2 tablets (100 mg) by mouth every 6 hours as needed for anxiety   Refills:  5        hyoscyamine 0.125 MG tablet   Commonly known as:  ANASPAZ/LEVSIN   Dose:  0.125-0.25 mg   Quantity:  40 tablet        Take 1-2 tablets (125-250 mcg) by mouth every 4 hours as needed for cramping   Refills:  9        MOBIC 15 MG tablet   Dose:  15 mg   Quantity:  30 tablet   Generic drug:  meloxicam        Take 1 tablet (15 mg) by mouth daily   Refills:  1        MS CONTIN 15 MG 12 hr tablet   Dose:  15 mg   Generic drug:  morphine        Take 15 mg by mouth every 12 hours   Refills:  0        naloxegol 25 MG Tabs tablet   Dose:  25 mg   Quantity:  30 tablet        Take 1 tablet (25 mg) by mouth every morning  (before breakfast)   Refills:  11        naloxone 0.4 MG/ML injection   Commonly known as:  NARCAN   Dose:  0.1-0.4 mg   Quantity:  1 mL        Inject 0.25-1 mLs (0.1-0.4 mg) into the vein once for 1 dose   Refills:  3        Omega-3 Krill Oil 300 MG Caps   Dose:  300 mg        Take 300 mg by mouth daily   Refills:  0        ondansetron 4 MG ODT tab   Commonly known as:  ZOFRAN ODT   Dose:  4 mg   Quantity:  20 tablet        Take 1 tablet (4 mg) by mouth every 6 hours as needed for nausea or vomiting   Refills:  5        oxyCODONE IR 5 MG tablet   Commonly known as:  ROXICODONE   Dose:  10 mg   Quantity:  18 tablet        Take 2 tablets (10 mg) by mouth every 4 hours as needed for pain   Refills:  0        pregabalin 100 MG capsule   Commonly known as:  LYRICA   Dose:  100 mg   Quantity:  270 capsule        Take 1 capsule (100 mg) by mouth daily And 2 capsules (200 mg) at bedtime   Refills:  1        senna-docusate 8.6-50 MG per tablet   Commonly known as:  SENOKOT-S;PERICOLACE   Dose:  2 tablet   Quantity:  120 tablet        Take 2 tablets by mouth 2 times daily   Refills:  5        * traZODone 150 MG tablet   Commonly known as:  DESYREL   Quantity:  30 tablet        take 1 tablet by mouth daily  at bedtime   Refills:  3        * traZODone 50 MG tablet   Commonly known as:  DESYREL   Quantity:  30 tablet        Take 1 tablet (50mg) by mouth nightly with 150 mg tablets as needed for sleep for a total of 200mg.   Refills:  3        vitamin D 2000 units tablet   Dose:  2000 Units   Quantity:  100 tablet        Take 2,000 Units by mouth daily   Refills:  0        vitamin E 400 UNIT capsule   Quantity:  30 capsule        Take by mouth daily   Refills:  0        * Notice:  This list has 2 medication(s) that are the same as other medications prescribed for you. Read the directions carefully, and ask your doctor or other care provider to review them with you.            Procedures and tests performed during your visit      CBC with platelets differential    Comprehensive metabolic panel    Peripheral IV catheter    UA with Microscopic reflex to Culture      Orders Needing Specimen Collection     Ordered          08/20/18 1650  Enteric Bacteria and Virus Panel by FAUZIA Stool - STAT, Prio: STAT, Status: Sent     Scheduled Task Status   08/20/18 1651 [facility] Reminder: Open   Order Class:  PCU Collect                  Pending Results     No orders found from 8/18/2018 to 8/21/2018.            Pending Culture Results     No orders found from 8/18/2018 to 8/21/2018.            Pending Results Instructions     If you had any lab results that were not finalized at the time of your Discharge, you can call the ED Lab Result RN at [phone]. You will be contacted by this team for any positive Lab results or changes in treatment. The nurses are available 7 days a week from 10A to 6:30P.  You can leave a message 24 hours per day and they will return your call.        Test Results From Your Hospital Stay        8/20/2018  5:51 PM      Component Results     Component Value Ref Range & Units Status    WBC 6.4 4.0 - 11.0 10e9/L Final    RBC Count 4.36 3.8 - 5.2 10e12/L Final    Hemoglobin 12.6 11.7 - 15.7 g/dL Final    Hematocrit 38.9 35.0 - 47.0 % Final    MCV 89 78 - 100 fl Final    MCH 28.9 26.5 - 33.0 pg Final    MCHC 32.4 31.5 - 36.5 g/dL Final    RDW 13.8 10.0 - 15.0 % Final    Platelet Count 237 150 - 450 10e9/L Final    Diff Method Automated Method  Final    % Neutrophils 40.8 % Final    % Lymphocytes 46.5 % Final    % Monocytes 10.3 % Final    % Eosinophils 1.6 % Final    % Basophils 0.6 % Final    % Immature Granulocytes 0.2 % Final    Nucleated RBCs 0 0 /100 Final    Absolute Neutrophil 2.6 1.6 - 8.3 10e9/L Final    Absolute Lymphocytes 3.0 0.8 - 5.3 10e9/L Final    Absolute Monocytes 0.7 0.0 - 1.3 10e9/L Final    Absolute Eosinophils 0.1 0.0 - 0.7 10e9/L Final    Absolute Basophils 0.0 0.0 - 0.2 10e9/L Final    Abs Immature  Granulocytes 0.0 0 - 0.4 10e9/L Final    Absolute Nucleated RBC 0.0  Final         8/20/2018  6:13 PM      Component Results     Component Value Ref Range & Units Status    Sodium 141 133 - 144 mmol/L Final    Potassium 3.8 3.4 - 5.3 mmol/L Final    Chloride 108 94 - 109 mmol/L Final    Carbon Dioxide 31 20 - 32 mmol/L Final    Anion Gap 2 (L) 3 - 14 mmol/L Final    Glucose 89 70 - 99 mg/dL Final    Urea Nitrogen 8 7 - 30 mg/dL Final    Creatinine 0.93 0.52 - 1.04 mg/dL Final    GFR Estimate 64 >60 mL/min/1.7m2 Final    Non  GFR Calc    GFR Estimate If Black 77 >60 mL/min/1.7m2 Final    African American GFR Calc    Calcium 8.6 8.5 - 10.1 mg/dL Final    Bilirubin Total 0.4 0.2 - 1.3 mg/dL Final    Albumin 3.6 3.4 - 5.0 g/dL Final    Protein Total 7.0 6.8 - 8.8 g/dL Final    Alkaline Phosphatase 104 40 - 150 U/L Final    ALT 30 0 - 50 U/L Final    AST 21 0 - 45 U/L Final         8/20/2018  5:23 PM      Component Results     Component Value Ref Range & Units Status    Color Urine Yellow  Final    Appearance Urine Clear  Final    Glucose Urine Negative NEG^Negative mg/dL Final    Bilirubin Urine Negative NEG^Negative Final    Ketones Urine Negative NEG^Negative mg/dL Final    Specific Gravity Urine 1.010 1.003 - 1.035 Final    Blood Urine Negative NEG^Negative Final    pH Urine 6.0 5.0 - 7.0 pH Final    Protein Albumin Urine Negative NEG^Negative mg/dL Final    Urobilinogen mg/dL 0.0 0.0 - 2.0 mg/dL Final    Nitrite Urine Negative NEG^Negative Final    Leukocyte Esterase Urine Negative NEG^Negative Final    Source Midstream Urine  Final    WBC Urine 1 0 - 5 /HPF Final    RBC Urine <1 0 - 2 /HPF Final    Bacteria Urine Few (A) NEG^Negative /HPF Final    Squamous Epithelial /HPF Urine <1 0 - 1 /HPF Final    Mucous Urine Present (A) NEG^Negative /LPF Final                Clinical Quality Measure: Blood Pressure Screening     Your blood pressure was checked while you were in the emergency department today.  "The last reading we obtained was  BP: 110/83 . Please read the guidelines below about what these numbers mean and what you should do about them.  If your systolic blood pressure (the top number) is less than 120 and your diastolic blood pressure (the bottom number) is less than 80, then your blood pressure is normal. There is nothing more that you need to do about it.  If your systolic blood pressure (the top number) is 120-139 or your diastolic blood pressure (the bottom number) is 80-89, your blood pressure may be higher than it should be. You should have your blood pressure rechecked within a year by a primary care provider.  If your systolic blood pressure (the top number) is 140 or greater or your diastolic blood pressure (the bottom number) is 90 or greater, you may have high blood pressure. High blood pressure is treatable, but if left untreated over time it can put you at risk for heart attack, stroke, or kidney failure. You should have your blood pressure rechecked by a primary care provider within the next 4 weeks.  If your provider in the emergency department today gave you specific instructions to follow-up with your doctor or provider even sooner than that, you should follow that instruction and not wait for up to 4 weeks for your follow-up visit.        Thank you for choosing [facility]       Thank you for choosing [facility] for your care. Our goal is always to provide you with excellent care. Hearing back from our patients is one way we can continue to improve our services. Please take a few minutes to complete the written survey that you may receive in the mail after you visit with us. Thank you!        [employer] Information     [employer] lets you send messages to your doctor, view your test results, renew your prescriptions, schedule appointments and more. To sign up, go to [email or web address] . Click on \"Log in\" on the left side of the screen, which will take you to the Welcome page. Then click on " "\"Sign up Now\" on the right side of the page.     You will be asked to enter the access code listed below, as well as some personal information. Please follow the directions to create your username and password.     Your access code is: [email or web address]  Expires: 2018  6:36 PM     Your access code will  in 90 days. If you need help or a new code, please call your [facility] clinic or [phone].        Care EveryWhere ID     This is your Care EveryWhere ID. This could be used by other organizations to access your [facility] medical records  [id number]        Equal Access to Services     [facility]: Esperanza Goodman, florencia newell, vickie lopez, daniel membreno . So [facility] [phone].    ATENCIÓN: Si habla español, tiene a rowland disposición servicios gratuitos de asistencia lingüística. Llame al [phone].    We comply with applicable federal civil rights laws and [place] laws. We do not discriminate on the basis of race, color, national origin, age, disability, sex, sexual orientation, or gender identity.            After Visit Summary       This is your record. Keep this with you and show to your community pharmacist(s) and doctor(s) at your next visit.                  "

## 2018-08-20 NOTE — ED AVS SNAPSHOT
[facility] Emergency Department    [address]    [facility] [address]    Phone:  [phone]    Fax:  [phone]                                       Cristela Smith   MRN: [id number]    Department:  [facility] Emergency Department   Date of Visit:  8/20/2018           After Visit Summary Signature Page     I have received my discharge instructions, and my questions have been answered. I have discussed any challenges I see with this plan with the nurse or doctor.    ..........................................................................................................................................  Patient/Patient Representative Signature      ..........................................................................................................................................  Patient Representative Print Name and Relationship to Patient    ..................................................               ................................................  Date                                            Time    ..........................................................................................................................................  Reviewed by Signature/Title    ...................................................              ..............................................  Date                                                            Time

## 2018-08-20 NOTE — TELEPHONE ENCOUNTER
Pt called back and wanted writer to inform Jimena LONG that she is going to [facility] ER.    Suyapa Olivares RN

## 2018-08-22 ENCOUNTER — OFFICE VISIT (OUTPATIENT)
Dept: PEDIATRICS | Facility: CLINIC | Age: 53
End: 2018-08-22
Payer: MEDICARE

## 2018-08-22 VITALS
DIASTOLIC BLOOD PRESSURE: 60 MMHG | SYSTOLIC BLOOD PRESSURE: 86 MMHG | HEIGHT: 60 IN | TEMPERATURE: 98.2 F | HEART RATE: 63 BPM | BODY MASS INDEX: 32.22 KG/M2 | WEIGHT: 164.1 LBS | OXYGEN SATURATION: 95 %

## 2018-08-22 DIAGNOSIS — K59.09 OTHER CONSTIPATION: ICD-10-CM

## 2018-08-22 DIAGNOSIS — J34.89 NASAL OBSTRUCTION: ICD-10-CM

## 2018-08-22 DIAGNOSIS — Z87.19 HISTORY OF DENTAL PROBLEMS: ICD-10-CM

## 2018-08-22 DIAGNOSIS — L72.3 SEBACEOUS CYST: ICD-10-CM

## 2018-08-22 DIAGNOSIS — K52.9 GASTROENTERITIS: Primary | ICD-10-CM

## 2018-08-22 DIAGNOSIS — F51.01 PRIMARY INSOMNIA: ICD-10-CM

## 2018-08-22 DIAGNOSIS — G56.41 COMPLEX REGIONAL PAIN SYNDROME TYPE 2 OF RIGHT UPPER EXTREMITY: ICD-10-CM

## 2018-08-22 DIAGNOSIS — F41.9 ANXIETY: ICD-10-CM

## 2018-08-22 PROCEDURE — 99214 OFFICE O/P EST MOD 30 MIN: CPT | Performed by: INTERNAL MEDICINE

## 2018-08-22 RX ORDER — AMOXICILLIN 250 MG
2 CAPSULE ORAL 2 TIMES DAILY
Qty: 120 TABLET | Refills: 5 | Status: SHIPPED | OUTPATIENT
Start: 2018-08-22 | End: 2019-05-15

## 2018-08-22 RX ORDER — HYDROXYZINE HYDROCHLORIDE 50 MG/1
100 TABLET, FILM COATED ORAL EVERY 6 HOURS PRN
Qty: 180 TABLET | Refills: 5 | Status: SHIPPED | OUTPATIENT
Start: 2018-08-22 | End: 2019-08-23

## 2018-08-22 RX ORDER — TRAZODONE HYDROCHLORIDE 50 MG/1
TABLET, FILM COATED ORAL
Qty: 30 TABLET | Refills: 3 | Status: SHIPPED | OUTPATIENT
Start: 2018-08-22 | End: 2018-12-12

## 2018-08-22 NOTE — MR AVS SNAPSHOT
After Visit Summary   8/22/2018    Cristela Smith    MRN: [id number]           Patient Information     Date Of Birth          1965        Visit Information        Provider Department      8/22/2018 2:40 PM Halle De Los Santos MD [facility]        Today's Diagnoses     Gastroenteritis    -  1    Anxiety        Other constipation        Primary insomnia        History of dental problems        Nasal obstruction          Care Instructions    1. Push fluids, try to advance bland foods  2. Refilled medications  3. Referral to ENT          Follow-ups after your visit        Additional Services     OTOLARYNGOLOGY REFERRAL       Your provider has referred you to: N: [facility] [place] [phone]   [email or web address]  [place] [phone]   [email or web address]  N: [facility] [place] [phone]   [email or web address]  FHN: [facility] [place] [phone]   [email or web address]  [place] [phone]   [email or web address]    Please be aware that coverage of these services is subject to the terms and limitations of your health insurance plan.  Call member services at your health plan with any benefit or coverage questions.      Please bring the following with you to your appointment:    (1) Any X-Rays, CTs or MRIs which have been performed.  Contact the facility where they were done to arrange for  prior to your scheduled appointment.   (2) List of current medications  (3) This referral request   (4) Any documents/labs given to you for this referral                  Who to contact     If you have questions or need follow up information about today's clinic visit or your schedule please contact [facility] directly at [phone].  Normal or non-critical lab and  "imaging results will be communicated to you by MyChart, letter or phone within 4 business days after the clinic has received the results. If you do not hear from us within 7 days, please contact the clinic through [facility] or phone. If you have a critical or abnormal lab result, we will notify you by phone as soon as possible.  Submit refill requests through [employer] or call your pharmacy and they will forward the refill request to us. Please allow 3 business days for your refill to be completed.          Additional Information About Your Visit        [facility] [employer] Information     [employer] lets you send messages to your doctor, view your test results, renew your prescriptions, schedule appointments and more. To sign up, go to [email or web address] . Click on \"Log in\" on the left side of the screen, which will take you to the Welcome page. Then click on \"Sign up Now\" on the right side of the page.     You will be asked to enter the access code listed below, as well as some personal information. Please follow the directions to create your username and password.     Your access code is: [email or web address]  Expires: 2018  6:36 PM     Your access code will  in 90 days. If you need help or a new code, please call your [facility] clinic or [phone].        Care EveryWhere ID     This is your Care EveryWhere ID. This could be used by other organizations to access your [facility] medical records  [id number]        Your Vitals Were     Pulse Temperature Height Pulse Oximetry BMI (Body Mass Index)       63 98.2  F (36.8  C) (Tympanic) 5' 0.05\" (1.525 m) 95% 32 kg/m2        Blood Pressure from Last 3 Encounters:   18 (!) 86/60   18 128/78   18 110/70    Weight from Last 3 Encounters:   18 164 lb 1.6 oz (74.4 kg)   18 166 lb 12.8 oz (75.7 kg)   17 165 lb 14.4 oz (75.3 kg)              We Performed the Following     OTOLARYNGOLOGY REFERRAL          Where to get your medicines      These " medications were sent to [id number] - [place], [place] - [address]  [address], [facility] [address]     Phone:  [phone]     hydrOXYzine 50 MG tablet    senna-docusate 8.6-50 MG per tablet    traZODone 50 MG tablet          Primary Care Provider Office Phone # Fax #    Halle De Los Santos [phone] [address] [place] [address]        Equal Access to Services     [facility]: Hadii aad ku hadasho Soomaali, waaxda luqadaha, qaybta kaalmada adeegyada, waxay idiin hayaan adeeg kharash lacarrington . So [facility] [phone].    ATENCIÓN: Si habla español, tiene a rowland disposición servicios gratuitos de asistencia lingüística. [facility] [phone].    We comply with applicable federal civil rights laws and [place] laws. We do not discriminate on the basis of race, color, national origin, age, disability, sex, sexual orientation, or gender identity.            Thank you!     Thank you for choosing [facility]  for your care. Our goal is always to provide you with excellent care. Hearing back from our patients is one way we can continue to improve our services. Please take a few minutes to complete the written survey that you may receive in the mail after your visit with us. Thank you!             Your Updated Medication List - Protect others around you: Learn how to safely use, store and throw away your medicines at www.disposemymeds.org.          This list is accurate as of 8/22/18  3:40 PM.  Always use your most recent med list.                   Brand Name Dispense Instructions for use Diagnosis    albuterol 108 (90 Base) MCG/ACT inhaler    PROAIR HFA/PROVENTIL HFA/VENTOLIN HFA    1 Inhaler    Inhale 2 puffs into the lungs every 6 hours as needed for shortness of breath / dyspnea or wheezing    Viral URI with cough       atorvastatin 20 MG tablet    LIPITOR    90 tablet    Take 1 tablet (20 mg) by mouth At Bedtime    Hyperlipidemia LDL goal  <130       buPROPion 150 MG 24 hr tablet    WELLBUTRIN XL    30 tablet    TAKE 1 TABLET(150 MG) BY MOUTH EVERY MORNING    Tobacco use       butalbital-acetaminophen-caffeine -40 MG per tablet    FIORICET/ESGIC     Take 1-2 tablets by mouth 4 times daily as needed        clonazePAM 0.5 MG tablet    klonoPIN     Take 0.5 mg by mouth 2 times daily as needed for anxiety        cyanocobalamin 2500 MCG sublingual tablet    VITAMIN B-12    90 tablet    Place 2,500 mcg under the tongue daily    Vitamin B12 deficiency (non anemic)       EPINEPHrine 0.3 MG/0.3ML injection 2-pack    EPIPEN/ADRENACLICK/or ANY BX GENERIC EQUIV    0.6 mL    Inject 0.3 mLs (0.3 mg) into the muscle as needed for anaphylaxis    Reaction to insect bite       estropipate 1.5 MG tablet    OGEN    90 tablet    Take 1 tablet by mouth once daily.    Menopausal syndrome (hot flashes)       fluticasone 50 MCG/ACT spray    FLONASE    1 Bottle    Spray 1-2 sprays into both nostrils daily as needed for rhinitis or allergies    Other chronic rhinitis       hydrOXYzine 50 MG tablet    ATARAX    180 tablet    Take 2 tablets (100 mg) by mouth every 6 hours as needed for anxiety    Anxiety       hyoscyamine 0.125 MG tablet    ANASPAZ/LEVSIN    40 tablet    Take 1-2 tablets (125-250 mcg) by mouth every 4 hours as needed for cramping    Irritable bowel syndrome without diarrhea       MOBIC 15 MG tablet   Generic drug:  meloxicam     30 tablet    Take 1 tablet (15 mg) by mouth daily        MS CONTIN 15 MG 12 hr tablet   Generic drug:  morphine      Take 15 mg by mouth every 12 hours        naloxegol 25 MG Tabs tablet     30 tablet    Take 1 tablet (25 mg) by mouth every morning (before breakfast)    Drug-induced constipation, Chronic pain syndrome, Chronic constipation       naloxone 0.4 MG/ML injection    NARCAN    1 mL    Inject 0.25-1 mLs (0.1-0.4 mg) into the vein once for 1 dose    Chronic pain syndrome       Omega-3 Krill Oil 300 MG Caps      Take 300 mg  by mouth daily        ondansetron 4 MG ODT tab    ZOFRAN ODT    20 tablet    Take 1 tablet (4 mg) by mouth every 6 hours as needed for nausea or vomiting    Nausea       oxyCODONE IR 5 MG tablet    ROXICODONE    18 tablet    Take 2 tablets (10 mg) by mouth every 4 hours as needed for pain        pregabalin 100 MG capsule    LYRICA    270 capsule    Take 1 capsule (100 mg) by mouth daily And 2 capsules (200 mg) at bedtime    Chronic pain syndrome       senna-docusate 8.6-50 MG per tablet    SENOKOT-S;PERICOLACE    120 tablet    Take 2 tablets by mouth 2 times daily    Other constipation       * traZODone 150 MG tablet    DESYREL    30 tablet    take 1 tablet by mouth daily  at bedtime    Primary insomnia       * traZODone 50 MG tablet    DESYREL    30 tablet    Take 1 tablet (50mg) by mouth nightly with 150 mg tablets as needed for sleep for a total of 200mg.    Primary insomnia       vitamin D 2000 units tablet     100 tablet    Take 2,000 Units by mouth daily        vitamin E 400 UNIT capsule     30 capsule    Take by mouth daily        * Notice:  This list has 2 medication(s) that are the same as other medications prescribed for you. Read the directions carefully, and ask your doctor or other care provider to review them with you.

## 2018-08-23 ENCOUNTER — PATIENT OUTREACH (OUTPATIENT)
Dept: CARE COORDINATION | Facility: CLINIC | Age: 53
End: 2018-08-23

## 2018-08-23 NOTE — LETTER
[facility] CARE COORDINATION    August 27, 2018    Cristela Smith  [address] [address] [address]      Dear Cristela,    I am a clinic care coordinator who works with Halle De Los Santos MD. I have been trying to reach you recently to introduce Clinic Care Coordination. Dr. De Los Santos wanted me to reach out and provide you with some information about available transportation options. Included is a few options in the area. I also included a flyer with a description of clinic care coordination and some ways in which I can further assist you.     Please feel free to contact me at [phone], with any questions or concerns.     Sincerely,     Miracle Nelson

## 2018-08-23 NOTE — PROGRESS NOTES
Clinic Care Coordination Contact  [facility]/Voicemail    Referral Source: PCP  Clinical Data: Care Coordinator Outreach  Outreach attempted x 2.  Left message on voicemail with call back information and requested return call. Please see contact log for detailed outreach attempt dates.  Plan: Care Coordinator will mail out care coordination introduction/[facility] letter with care coordinator contact information and explanation of care coordination services. Will also include a list of available transportation options. Care Coordinator will await a return call and will do no further outreaches at this time.    DEBBIE Kim  Clinic Care Coordinator  [phone]  [email or web address]

## 2018-09-10 ENCOUNTER — TRANSFERRED RECORDS (OUTPATIENT)
Dept: HEALTH INFORMATION MANAGEMENT | Facility: CLINIC | Age: 53
End: 2018-09-10

## 2018-09-11 ENCOUNTER — TRANSFERRED RECORDS (OUTPATIENT)
Dept: HEALTH INFORMATION MANAGEMENT | Facility: CLINIC | Age: 53
End: 2018-09-11

## 2018-09-11 DIAGNOSIS — Z72.0 TOBACCO USE: ICD-10-CM

## 2018-09-12 RX ORDER — BUPROPION HYDROCHLORIDE 150 MG/1
150 TABLET ORAL EVERY MORNING
Qty: 30 TABLET | Refills: 3 | Status: SHIPPED | OUTPATIENT
Start: 2018-09-12 | End: 2019-01-08

## 2018-09-12 NOTE — TELEPHONE ENCOUNTER
"Requested Prescriptions   Pending Prescriptions Disp Refills     buPROPion (WELLBUTRIN XL) 150 MG 24 hr tablet    Last Written Prescription Date:  7/6/2018  Last Fill Quantity: 30,  # refills: 0   Last office visit: 8/22/2018 with prescribing provider:  Halle De Los Santos     Future Office Visit:     30 tablet 0    SSRIs Protocol Passed    9/11/2018  5:28 PM       Passed - Recent (12 mo) or future (30 days) visit within the authorizing provider's specialty    Patient had office visit in the last 12 months or has a visit in the next 30 days with authorizing provider or within the authorizing provider's specialty.  See \"Patient Info\" tab in inbasket, or \"Choose Columns\" in Meds & Orders section of the refill encounter.           Passed - Medication is Bupropion    If the medication is Bupropion (Wellbutrin), and the patient is taking for smoking cessation; OK to refill.         Passed - Patient is age 18 or older       Passed - No active pregnancy on record       Passed - No positive pregnancy test in last 12 months          "

## 2018-09-17 ENCOUNTER — TELEPHONE (OUTPATIENT)
Dept: PEDIATRICS | Facility: CLINIC | Age: 53
End: 2018-09-17

## 2018-09-17 DIAGNOSIS — Z79.891 LONG TERM CURRENT USE OF OPIATE ANALGESIC: ICD-10-CM

## 2018-09-17 DIAGNOSIS — R53.83 FATIGUE: ICD-10-CM

## 2018-09-17 DIAGNOSIS — G90.511 COMPLEX REGIONAL PAIN SYNDROME OF RIGHT UPPER EXTREMITY: ICD-10-CM

## 2018-09-17 DIAGNOSIS — G47.19 EXCESSIVE DAYTIME SLEEPINESS: Primary | ICD-10-CM

## 2018-09-17 NOTE — TELEPHONE ENCOUNTER
I called patient and she reports that she is unable to stay awake.  This has been ongoing off and on for the past 5 weeks.  Falls asleep in the middle of a conversation.  States she was shopping in the [employer] store last week, and sat down in the recliner and fell asleep in a chair.  They awoke her, and she checked out and made it to her car, and then fell asleep in her car.   Ambulance was called, and checked her out.  She had been off balance and sleeping all weekend.  Is sleeping twelve hours at night, and doesn't awaken  Patient still feels nauseous, but no emesis.  Thinks she may be dehydrated.  Wondering if Vitamin deficiency may be causing symptoms.  Had procedure with Dr. Beyer last Tuesday, and doesn't feel that this is the cause for her symptoms.  Has been slowly decreasing her narcotics, and Dr. Beyer is aware of this.  Advised patient not to drive a vehicle.  Speech is clear.  Is alert and oriented.  Able to move all extremities without difficulty.    Patient also was seen in the Urgency room for toenail removal and started on Augmentin twice daily.  These pills also make her feel nauseous, with one diarrhea stool per day.  Patient will try to drink a couple of bottles of water.  Has also been taking care of ill family members-increased stress.  Will be doing reconstruction of mouth starting next week.  DAVID Cassidy RN

## 2018-09-17 NOTE — TELEPHONE ENCOUNTER
Reason for Call:  Other call back    Detailed comments: Patient calling to speak with Jimena. Over the weekend she fell asleep in a store and in her car. She needs to be seen and would really prefer to see Dr De Los Santos. Please call her back today.    Phone Number Patient can be reached at: Home number on file [phone] (home)    Best Time: any    Can we leave a detailed message on this number? Not Applicable    Call taken on 9/17/2018 at 10:25 AM by Dena Gaitan

## 2018-09-17 NOTE — TELEPHONE ENCOUNTER
-I had to associate long-term use of opiate with Vitamin D deficiency lab-insurance wouldn't cover fatigue diagnosis.  If this is not acceptable, please change dx code.    Patient notified that Dr De Los Santos has ordered labs and a referral to sleep medicine  She will go to [facility] for lab draw, and will call sleep medicine for an appointment.  Advised to contact the clinic if symptoms worsen.  No further questions.  Will call back if any other questions or concerns.  Reports that since speaking with me earlier, she feels much better.  DAVID Cassidy RN

## 2018-09-18 ENCOUNTER — HOSPITAL ENCOUNTER (OUTPATIENT)
Dept: LAB | Facility: CLINIC | Age: 53
Discharge: HOME | End: 2018-09-18
Attending: INTERNAL MEDICINE | Admitting: INTERNAL MEDICINE
Payer: MEDICARE

## 2018-09-18 ENCOUNTER — TRANSFERRED RECORDS (OUTPATIENT)
Dept: HEALTH INFORMATION MANAGEMENT | Facility: CLINIC | Age: 53
End: 2018-09-18

## 2018-09-18 DIAGNOSIS — Z79.891 LONG TERM CURRENT USE OF OPIATE ANALGESIC: ICD-10-CM

## 2018-09-18 DIAGNOSIS — G90.511 COMPLEX REGIONAL PAIN SYNDROME OF RIGHT UPPER EXTREMITY: ICD-10-CM

## 2018-09-18 DIAGNOSIS — R53.83 FATIGUE: ICD-10-CM

## 2018-09-18 LAB
ALBUMIN SERPL-MCNC: 3.7 G/DL (ref 3.4–5)
ALP SERPL-CCNC: 98 U/L (ref 40–150)
ALT SERPL W P-5'-P-CCNC: 23 U/L (ref 0–50)
ANION GAP SERPL CALCULATED.3IONS-SCNC: 5 MMOL/L (ref 3–14)
AST SERPL W P-5'-P-CCNC: 20 U/L (ref 0–45)
BASOPHILS # BLD AUTO: 0 10E9/L (ref 0–0.2)
BASOPHILS NFR BLD AUTO: 0.8 %
BILIRUB SERPL-MCNC: 0.3 MG/DL (ref 0.2–1.3)
BUN SERPL-MCNC: 7 MG/DL (ref 7–30)
CALCIUM SERPL-MCNC: 8.6 MG/DL (ref 8.5–10.1)
CHLORIDE SERPL-SCNC: 102 MMOL/L (ref 94–109)
CO2 SERPL-SCNC: 31 MMOL/L (ref 20–32)
CREAT SERPL-MCNC: 1.03 MG/DL (ref 0.52–1.04)
DIFFERENTIAL METHOD BLD: NORMAL
EOSINOPHIL # BLD AUTO: 0.2 10E9/L (ref 0–0.7)
EOSINOPHIL NFR BLD AUTO: 3.2 %
ERYTHROCYTE [DISTWIDTH] IN BLOOD BY AUTOMATED COUNT: 13.3 % (ref 10–15)
GFR SERPL CREATININE-BSD FRML MDRD: 56 ML/MIN/1.7M2
GLUCOSE SERPL-MCNC: 112 MG/DL (ref 70–99)
HCT VFR BLD AUTO: 40.9 % (ref 35–47)
HGB BLD-MCNC: 13.2 G/DL (ref 11.7–15.7)
IMM GRANULOCYTES # BLD: 0 10E9/L (ref 0–0.4)
IMM GRANULOCYTES NFR BLD: 0.2 %
LYMPHOCYTES # BLD AUTO: 2.1 10E9/L (ref 0.8–5.3)
LYMPHOCYTES NFR BLD AUTO: 39.6 %
MCH RBC QN AUTO: 28.9 PG (ref 26.5–33)
MCHC RBC AUTO-ENTMCNC: 32.3 G/DL (ref 31.5–36.5)
MCV RBC AUTO: 90 FL (ref 78–100)
MONOCYTES # BLD AUTO: 0.4 10E9/L (ref 0–1.3)
MONOCYTES NFR BLD AUTO: 7.6 %
NEUTROPHILS # BLD AUTO: 2.6 10E9/L (ref 1.6–8.3)
NEUTROPHILS NFR BLD AUTO: 48.6 %
NRBC # BLD AUTO: 0 10*3/UL
NRBC BLD AUTO-RTO: 0 /100
PLATELET # BLD AUTO: 262 10E9/L (ref 150–450)
POTASSIUM SERPL-SCNC: 4.1 MMOL/L (ref 3.4–5.3)
PROT SERPL-MCNC: 7.1 G/DL (ref 6.8–8.8)
RBC # BLD AUTO: 4.57 10E12/L (ref 3.8–5.2)
SODIUM SERPL-SCNC: 138 MMOL/L (ref 133–144)
TSH SERPL DL<=0.005 MIU/L-ACNC: 0.83 MU/L (ref 0.4–4)
VIT B12 SERPL-MCNC: 691 PG/ML (ref 193–986)
WBC # BLD AUTO: 5.3 10E9/L (ref 4–11)

## 2018-09-18 PROCEDURE — 84443 ASSAY THYROID STIM HORMONE: CPT | Performed by: INTERNAL MEDICINE

## 2018-09-18 PROCEDURE — 85025 COMPLETE CBC W/AUTO DIFF WBC: CPT | Performed by: INTERNAL MEDICINE

## 2018-09-18 PROCEDURE — 82306 VITAMIN D 25 HYDROXY: CPT | Performed by: INTERNAL MEDICINE

## 2018-09-18 PROCEDURE — 82607 VITAMIN B-12: CPT | Performed by: INTERNAL MEDICINE

## 2018-09-18 PROCEDURE — 80053 COMPREHEN METABOLIC PANEL: CPT | Performed by: INTERNAL MEDICINE

## 2018-09-18 PROCEDURE — 36415 COLL VENOUS BLD VENIPUNCTURE: CPT | Performed by: INTERNAL MEDICINE

## 2018-09-19 DIAGNOSIS — F51.01 PRIMARY INSOMNIA: ICD-10-CM

## 2018-09-19 LAB — DEPRECATED CALCIDIOL+CALCIFEROL SERPL-MC: 46 UG/L (ref 20–75)

## 2018-09-19 RX ORDER — TRAZODONE HYDROCHLORIDE 50 MG/1
TABLET, FILM COATED ORAL
Qty: 30 TABLET | Refills: 3 | OUTPATIENT
Start: 2018-09-19

## 2018-09-19 NOTE — TELEPHONE ENCOUNTER
Called pharmacy and confirmed receipt of script in August. Please remove pended order and close encounter.  Dalila Zabala LPN

## 2018-09-19 NOTE — TELEPHONE ENCOUNTER
Not due for a refill.     traZODone (DESYREL) 50 MG tablet was filled on 8/22/2018, qty 30 with 3 refills.

## 2018-09-19 NOTE — TELEPHONE ENCOUNTER
This was sent in August. Please check with pharmacy    Thanks,  Halle De Los Santos MD  Internal Medicine/Pediatrics

## 2018-09-25 ENCOUNTER — TELEPHONE (OUTPATIENT)
Dept: PEDIATRICS | Facility: CLINIC | Age: 53
End: 2018-09-25

## 2018-09-25 NOTE — TELEPHONE ENCOUNTER
Patient calls.      Was seen in the Urgency Room for toenail removal and treated with Augmentin twice daily for 10 days treatment.    She was also given one pill of Diflucan and directed to take it on day 3 of the antibiotic for yeast infection.      Patient now finished the antibiotic yesterday ( 9/24) and yeast infection is out of control.      Per protocol, E-visit recommended ( will need to sign up), may need to come to lab for testing.  Can make an appointment as well.    Call to patient-left a detailed message how to set up MC and send an e-visit, or schedule appointment.  Asked patient to call back with any questions.    Camryn Diop RN  Message handled by Nurse Triage.

## 2018-10-01 ENCOUNTER — OFFICE VISIT (OUTPATIENT)
Dept: SLEEP MEDICINE | Facility: CLINIC | Age: 53
End: 2018-10-01
Payer: MEDICARE

## 2018-10-01 VITALS
BODY MASS INDEX: 31.61 KG/M2 | SYSTOLIC BLOOD PRESSURE: 89 MMHG | WEIGHT: 161 LBS | DIASTOLIC BLOOD PRESSURE: 58 MMHG | HEIGHT: 60 IN | HEART RATE: 87 BPM | OXYGEN SATURATION: 98 % | RESPIRATION RATE: 14 BRPM

## 2018-10-01 DIAGNOSIS — G47.9 SLEEP DISTURBANCE: Primary | ICD-10-CM

## 2018-10-01 DIAGNOSIS — G47.19 EXCESSIVE DAYTIME SLEEPINESS: ICD-10-CM

## 2018-10-01 DIAGNOSIS — G25.81 RESTLESS LEGS SYNDROME (RLS): ICD-10-CM

## 2018-10-01 DIAGNOSIS — E83.10 IRON METABOLISM DISORDER: ICD-10-CM

## 2018-10-01 PROCEDURE — 99204 OFFICE O/P NEW MOD 45 MIN: CPT | Performed by: INTERNAL MEDICINE

## 2018-10-01 NOTE — PROGRESS NOTES
Sleep Center [facility]  Outpatient Sleep Medicine Consultation  October 1, 2018      Name: Cristela Smith MRN# [id number]   Age: 53 year old YOB: 1965     Date of Consultation: October 1, 2018  Consultation is requested by: Halle De Los Santos MD  [address] [place] [address]  Primary care provider: Halle De Los Santos  Home clinic: [facility]        Reason for Sleep Consult:     Cristela Smith is a 53 year old female nightly snoring, poor quality of sleep and excessive daytime sleepiness and tiredness and restless legs.         Assessment and Plan:     Summary Sleep Diagnoses/Recommendations:    1. Sleep Disturbance:  High suspicion of sleep disordered breathing based on patient's symptoms (snoring, excessive daytime sleepiness and tiredness), high BMI and oropharyngeal examination in setting of narcotic use. Will schedule PSG with PAP titration in second half if patient meets criteria for DAVON in first half of PSG with TCM CO2. Patient is high risk of central apnea due to high dose of opiate/narcotic use. We also discussed the pathophysiology of sleep disordered breathing and the importance of treating it if S/he should have it. Patient is advised not to drive if he/she feels drowsy or sleepy.  Follow up after sleep study to discuss the result of sleep study and treatment options.    2.  Hypersomnolence due to above and medication side effects, including narcotics (MS contin and oxycodone), sedatives, trazodone, hydroxyzine and Pregabalin. Get sleep study as above. She needs to follow up PCP for medication review. Doubt related to Orexin deficiency.    3. Obesity:  Counseled regarding weight loss through diet modification and increased physical activity. Patient was given instuctions of weight loss and advised to follow up her PCP for further weight loss interventions.    4.  Hypotension: normally her BP is very low. She is not symptomatic. if symptomatic,  follow up Dr. De Los Santos or ED for evaluation.    5.  Restless leg syndrome: on high dose of Lyrica. R/o iron deficiency, order CBC, ferritin and iron level.    6.  Bruxism: continue use of mouth guard and follow up dentist for       Orders Placed This Encounter   Procedures     Comprehensive Sleep Study     Ferritin     Iron and Iron Binding Capacity     CBC with platelets differential       Summary Counseling:  See instructions    Counseling included a comprehensive review of diagnostic and therapeutic strategies as well as risks of inadequate therapy.  Educational materials provided in instructions.    All questions were answered.  The patient indicates understanding of the above issues and agrees with the plan set forth.           History of Present Illness:     Cristela Smith is a 53 year old female with history of hypertension, hyperlipidemia, depression, anxiety disorder, complex regional pain syndrome (CRPS), fibromyalgia, hx of cervical cancer and narcotic abuse in remission who presents to the [facility] in [place] with complains of sleep disturbance. I was asked to see Ms. Smith regarding excessive daytime sleepiness and fatigue by Dr. De Los Santos.   Patient complaints snoring, excessive daytime sleepiness and tiredness, morning headache, dry mouth and throat, difficulty breathing through the nose for past year but worse for the last 3 months. Her sleepiness affects her daily tasks. She falls asleep accidentally as below. She can not stay awake during the day even after a good sleep night. She falls asleep while in [employer] store recently and then her car at parking lot and awakening by police and EMS. She has nasal obstruction and difficulty breathing from nose, uses nasal breath strips. She toss and turn, leg kicking and restless legs. She has occasional sleep paralysis. Sh denies witnessed apnea and waking up gasping and choking. She has chronic pain different parts of her body and sees Dr. Beyer  pain specialist at [facility] and she has a contract for narcotic agreement. She had head trauma 7/2013 ago. She was admitted a [facility] for narcotic and benzo abuse 9 years ago. She grinds and clenches her teeth.    Please see below for sleep ROS details.    PREVIOUS IN- LAB or HOME SLEEP STUDIES:  None     SLEEP-WAKE SCHEDULE:     Cristela Smith     -Describes themself as a morning person;      -ON WEEKDAYS and ON WEEKENDS, goes to sleep at 8:00-9:00 PM during the week; awakens  6:00 AM without an alarm; falls asleep in < 5 minutes; denies difficulty falling asleep.      -Awakens 2 times a night for 10 minutes before falling back to sleep; awakens to go to the bathroom.      -Total sleep time: 7 hours per night.    -Naps 6-7 times/days per week for 120-300 minutes, feels unrefreshed after naps; takes frequent inadvertant naps.       BEDTIME ACTIVITIES AND SHIFT WORK:    Cristela Smith    -does use electronics in bed and does not watch TV in bed and read in bed.     -does not do shift work.  She is not working currently.     SCALES       SLEEP APNEA: Stopbang score: 3       INSOMNIA:  Insomnia severity score: 21       SLEEPINESS: Velarde sleepiness scale (ESS): 23   Drowsy driving/ yes but no near accidents          PHQ9: N/A    SLEEP COMPLAINTS:   Snoring- 3 days/week  Witness apnea: No  Gasping/Choking: No  Excessive daytime sleep: Yes  Toss/turn: Yes/No  Excessive tiredness/fatigue:  Yes  Morning headaches: Yes  Dry mouth/throat: Yes  Dyspnea: No  Coexisting Lung disease: No    Coexisting Heart disease: No    Does patient have a bed partner: Yes  Has bed partner been sleeping separately because of snoring:  No            RLS Screen: When you try to relax in the evening or sleep at  night, do you ever have unpleasant, restless feelings in your  legs that can be relieved by walking or movement? Yes    Periodic limb movement: Yes    Narcolepsy:       denies sudden urges of sleep attacks     denies  cataplexy     yes sleep paralysis      denies hallucinations     Sleep Behaviors:     denies leg symptoms/movements     denies motor restlessness     denies night terrors     yes bruxism     denies automatic behaviors    Other subjective complaints:     denies anxiety or rumination      denies pain and discomfort at  night     denies waking up with heart pounding or racing     denies GERD/heartburn         Parasomnia:   NREM - denies recurrent persistent confusional arousal, night eating or sleep terrors. Occasional sleep walking  REM  - denies dream enactment; no injuries. Vivid dreams 2-3 x amonth.    Safety: None             Medications:     Current Outpatient Prescriptions   Medication Sig     albuterol (PROAIR HFA/PROVENTIL HFA/VENTOLIN HFA) 108 (90 Base) MCG/ACT Inhaler Inhale 2 puffs into the lungs every 6 hours as needed for shortness of breath / dyspnea or wheezing     atorvastatin (LIPITOR) 20 MG tablet Take 1 tablet (20 mg) by mouth At Bedtime     buPROPion (WELLBUTRIN XL) 150 MG 24 hr tablet Take 1 tablet (150 mg) by mouth every morning     butalbital-acetaminophen-caffeine (FIORICET, ESGIC) -40 MG per tablet Take 1-2 tablets by mouth 4 times daily as needed     Cholecalciferol (VITAMIN D) 2000 UNITS tablet Take 2,000 Units by mouth daily     clonazePAM (KLONOPIN) 0.5 MG tablet Take 0.5 mg by mouth 2 times daily as needed for anxiety     cyabnocobalamin (VITAMIN B-12) 2500 MCG sublingual tablet Place 2,500 mcg under the tongue daily     EPINEPHrine (EPIPEN/ADRENACLICK/OR ANY BX GENERIC EQUIV) 0.3 MG/0.3ML injection 2-pack Inject 0.3 mLs (0.3 mg) into the muscle as needed for anaphylaxis     estropipate (OGEN) 1.5 MG tablet Take 1 tablet by mouth once daily.     fluticasone (FLONASE) 50 MCG/ACT spray Spray 1-2 sprays into both nostrils daily as needed for rhinitis or allergies     hydrOXYzine (ATARAX) 50 MG tablet Take 2 tablets (100 mg) by mouth every 6 hours as needed for anxiety     hyoscyamine  (ANASPAZ,LEVSIN) 0.125 MG tablet Take 1-2 tablets (125-250 mcg) by mouth every 4 hours as needed for cramping     meloxicam (MOBIC) 15 MG tablet Take 1 tablet (15 mg) by mouth daily     morphine (MS CONTIN) 15 MG 12 hr tablet Take 15 mg by mouth every 12 hours     naloxegol 25 MG TABS tablet Take 1 tablet (25 mg) by mouth every morning (before breakfast)     Omega-3 Krill Oil 300 MG CAPS Take 300 mg by mouth daily     ondansetron (ZOFRAN ODT) 4 MG ODT tab Take 1 tablet (4 mg) by mouth every 6 hours as needed for nausea or vomiting     oxyCODONE (ROXICODONE) 5 MG IR tablet Take 2 tablets (10 mg) by mouth every 4 hours as needed for pain     pregabalin (LYRICA) 100 MG capsule Take 1 capsule (100 mg) by mouth daily And 2 capsules (200 mg) at bedtime     senna-docusate (SENOKOT-S;PERICOLACE) 8.6-50 MG per tablet Take 2 tablets by mouth 2 times daily     traZODone (DESYREL) 150 MG tablet take 1 tablet by mouth daily  at bedtime     traZODone (DESYREL) 50 MG tablet Take 1 tablet (50mg) by mouth nightly with 150 mg tablets as needed for sleep for a total of 200mg.     vitamin E 400 UNIT capsule Take by mouth daily     naloxone (NARCAN) 0.4 MG/ML injection Inject 0.25-1 mLs (0.1-0.4 mg) into the vein once for 1 dose     No current facility-administered medications for this visit.         Medication that can affect sleep: Trazodone, Clonazepam, Lyrica, hydroxyzine and oxycodone 10 mg q hrs prn and Ms Contin 15 mg bid.    Allergies   Allergen Reactions     Aspirin      Sulfa Drugs      Tongue and roof of mouth feels burning sensation. 20 years ago     Topamax [Topiramate] Itching and Swelling     Toprol Xl [Metoprolol]      Adhesive Tape Rash     Latex Rash            Past Medical History:     Does not need 02 supplement at night     Past Medical History:   Diagnosis Date     Allergic state      Anxiety      Cervical cancer (H)      Complex regional pain syndrome type 1 affecting both upper arms      Hyperlipidemia LDL goal  <130      Hypertension      Moderate major depression (H)      Narcotic abuse in remission     had treatment at [facility]               Past Surgical History:    No previous upper airway surgery     Past Surgical History:   Procedure Laterality Date     CHOLECYSTECTOMY  1994    cervical     HYSTERECTOMY, CARTER       L5-S1 laminectomy              Social History:     Social History   Substance Use Topics     Smoking status: Current Every Day Smoker     Packs/day: 1.00     Years: 20.00     Types: Cigarettes     Smokeless tobacco: Never Used     Alcohol use No         Chemical History:     Tobacco: current smoker     Uses 1-2 cups/day of coffee. Last caffeine intake is usually before 6:30 am    EtOH: No   Recreational Drugs: No    Psych Hx:   Depression and anxiety disorder    Current dangers to self or others: None           Family History:     Family History   Problem Relation Age of Onset     Depression Brother      brother comitted suicide in dec     Diabetes Mother      HEART DISEASE Mother      Cerebrovascular Disease Maternal Grandfather      Skin Cancer Paternal Grandfather      HEART DISEASE Maternal Uncle      HEART DISEASE Paternal Uncle      Brain Cancer Paternal Uncle      HEART DISEASE Maternal Aunt      Breast Cancer Maternal Aunt         Sleep Family Hx:        RLS- No  DAVON - Mother  Insomnia - No  Parasomnia - No         Review of Systems:     A complete 10 point review of systems was negative other than HPI or as commented below:   Patient denies chest pain,  wheezing, abdominal pain, fever, chills, dysuria, leg pain or swelling. Patient is also denies ear pain, sore throat, postnasal drip,  dry cough.  She has night sweats, running nose, rapid heart rate, leg swellings, headaches, weakness and numbness of ext, rash, dyspnea with activity and productive cough. She has nausea, muscle and joint pain and swelling.    Cristela Smith has gained 0-5 pounds in the last year.            Physical Examination:    BP (!) 89/58  Pulse 87  Resp 14  Ht 1.524 m (5')  Wt 73 kg (161 lb)  SpO2 98%  BMI 31.44 kg/m2     Neck Circumference: 34 cm   Constitutional: . Awake, alert, cooperative, in no apparent distress  Mood: euthymic; affect congruent with full range and intensity.  Attention/Concentration:  Normal   Eyes: Pupils round and reactive. No icterus.  ENT: Mallampati Class: IV.   Tonsillar Stage: 1  hidden by pillars  Clear nasal passages. Enlarged inferior turbinates. No deviated septum.  Oropharynx: No high arched palate. No pharyngeal erythema or exudates, elongated uvula. No lateral narrowing  Tongue: No relative macroglossia   Dentition: Good.  Dentures: None  Neck: Supple, no thyroid enlargement.   Cardiovascular: Regular S1 and S2, no murmurs or gallops.    Pulmonary:  Chest symmetric, lungs reveal decreased breath sounds at bases. No rales or wheezes.  Abdomen: Soft, obese, non tender.  Extremities:  No pedal edema.  Muscle/joint: Strength and tone normal   Skin:  No rash or significant lesions examined areas of skin.   Neurologic: Alert, oriented x3, no focal neurological deficit.           Data: All pertinent previous laboratory data reviewed     Lab Results   Component Value Date    PH 7.40 10/18/2015    PO2 78 (L) 10/18/2015    PCO2 45 10/18/2015    HCO3 28 10/18/2015    ARAMIS 2.9 10/18/2015     Lab Results   Component Value Date    TSH 0.83 09/18/2018    TSH 1.60 11/30/2017     Lab Results   Component Value Date     (H) 09/18/2018    GLC 89 08/20/2018     Lab Results   Component Value Date    HGB 13.2 09/18/2018    HGB 12.6 08/20/2018     Lab Results   Component Value Date    BUN 7 09/18/2018    BUN 8 08/20/2018    CR 1.03 09/18/2018    CR 0.93 08/20/2018     Lab Results   Component Value Date    CO2 31 09/18/2018    CO2 31 08/20/2018     No results found for: SALMA      Echocardiography: 1/12/18  Final Conclusion   1. Calculated left ventricular ejection fraction (modified Espinosa technique) is 59 %.    2. No regional wall motion abnormalities.   3. Normal right ventricular chamber size. Normal right ventricular systolic function.   4. No significant valvular heart disease.     There were no prior studies available for comparison.   Estimated EF: 55-60%    Chest x-ray: 11/17/2017 4:11 PM     FINDINGS: There are no acute infiltrates. The cardiac silhouette is  not enlarged. Pulmonary vasculature is unremarkable.      IMPRESSION: No acute disease.    PFT: No        Copy to: Halle De Los Santos  Copy to: Halle Pro MD 10/1/2018   [facility] [place] [facility] [address], [place] [address]   [phone] Clinic    Total time spent with patient: 54 minutes with this patient today in which 25 minutes was spent in counseling/coordination of care and going over planned testing and recommendations.

## 2018-10-01 NOTE — PATIENT INSTRUCTIONS
"MY TREATMENT INFORMATION FOR SLEEP DISTURBANCE-  Cristela Smith    DOCTOR : Luis Alberto Pro MD  SLEEP CENTER :  [place]    MY CONTACT NUMBER:[phone]        If I haven't had a sleep study yet, what can I expect?  A personal story from Willie  [email or web address]/[email or web address]?v=[email or web address]    Suspected sleep apnea: Sleep study ordered. Other cause of excessive daytime sleepiness are medications, please follow up Dr. Beyer and Dr. De Los Santos for medication review. Your blood pressure is low today, if symptomatic, follow up Dr. De Los Santos or ED for evaluation.    Follow up in sleep clinic 1-2 weeks after sleep study to discuss results of sleep study and treatment options.    Patient was advised not to drive if drowsy or sleepy.    Frequently asked questions:  1. What is Obstructive Sleep Apnea (DAVON)? DAVON is the most common type of sleep apnea. Apnea literally means, \"without breath.\" It is characterized by repetitive pauses in breathing, despite continued effort to breathe, and is usually associated with a reduction in blood oxygen saturation. Apneas can last 10 to over 60 seconds. It is caused by narrowing or collapse of the upper airway as muscles relax during sleep. Severity of sleep apnea is determined by frequency of breathing events and their effect on your sleep and oxygen levels determined during sleep testing.   2. What are the consequences of DAVON? Symptoms include: daytime sleepiness- possibly increasing the risk of falling asleep while driving, unrefreshing/restless sleep, snoring, insomnia, waking frequently to urinate, waking with heartburn or reflux, reduced concentration and memory, and morning headaches. Other health consequences may include development of high blood pressure and other cardiovascular disease in persons who are susceptible. Untreated DAVON  can contribute to heart disease, stroke and diabetes.   3. What are the treatment options? In most situations, sleep apnea is a lifelong disease that must " be managed with daily therapy. Medications are not effective for sleep apnea and surgery is generally not performed until other therapies have been tried. Therapy is usually tailored to the individual patient based on many factors including your wishes as well as severity of sleep apnea and severity of obesity. Continuous Positive Airway (CPAP) is the most reliable treatment. An oral device to hold your jaw forward is usually the next most reliable option. Other options include postioning devices (to keep you off your back), weight loss, and surgery including a tongue pacing device. There is more detail about some of these options below.    Central sleep apnea is a disorder in which your breathing repeatedly stops and starts during sleep.  Central sleep apnea occurs because your brain doesn't send proper signals to the muscles that control your breathing. This condition is different from obstructive sleep apnea, in which you can't breathe normally because of upper airway obstruction. Central sleep apnea is less common than obstructive sleep apnea.  Central sleep apnea may occur as a result of other conditions, such as heart failure and stroke. Sleeping at a high altitude and pain medications also may cause central sleep apnea.        1. CPAP-  WHAT DOES IT DO AND HOW CAN I LEARN TO WEAR IT?                               BEFORE I START, CAN I WATCH A MOVIE TO GET A PLAN ON HOW TO USE CPAP?  https://www.Linkurious.com/watch?c=y2V45vu890M      Continuous positive airway pressure, or CPAP, is the most effective treatment for obstructive sleep apnea. It works by blowing room air, through a mask, to hold your throat open. A decision to use CPAP is a major step forward in the pursuit of a healthier life. The successful use of CPAP will help you breathe easier, sleep better and live healthier. You can choose CPAP equipment from any durable medical equipment provider that meets your needs.  Using CPAP can be a positive  "experience if you keep these [place]illo points in mind:  1. Commitment  CPAP is not a quick fix for your problem. It involves a long-term commitment to improve your sleep and your health.    2. Communication  Stay in close communication with both your sleep doctor and your CPAP supplier. Ask lots of questions and seek help when you need it.    3. Consistency  Use CPAP all night, every night and for every nap. You will receive the maximum health benefits from CPAP when you use it every time that you sleep. This will also make it easier for your body to adjust to the treatment.    4. Correction  The first machine and mask that you try may not be the best ones for you. Work with your sleep doctor and your CPAP supplier to make corrections to your equipment selection. Ask about trying a different type of machine or mask if you have ongoing problems. Make sure that your mask is a good fit and learn to use your equipment properly.    5. Challenge  Tell a family member or close friend to ask you each morning if you used your CPAP the previous night. Have someone to challenge you to give it your best effort.    6. Connection   Your adjustment to CPAP will be easier if you are able to connect with others who use the same treatment. Ask your sleep doctor if there is a support group in your area for people who have sleep apnea, or look for one on the Internet.  7. Comfort   Increase your level of comfort by using a saline spray, decongestant or heated humidifier if CPAP irritates your nose, mouth or throat. Use your unit's \"ramp\" setting to slowly get used to the air pressure level. There may be soft pads you can buy that will fit over your mask straps. Look on www.CPAP.com for accessories that can help make CPAP use more comfortable.  8. Cleaning   Clean your mask, tubing and headgear on a regular basis. Put this time in your schedule so that you don't forget to do it. Check and replace the filters for your CPAP unit and " humidifier.    9. Completion   Although you are never finished with CPAP therapy, you should reward yourself by celebrating the completion of your first month of treatment. Expect this first month to be your hardest period of adjustment. It will involve some trial and error as you find the machine, mask and pressure settings that are right for you.    10. Continuation  After your first month of treatment, continue to make a daily commitment to use your CPAP all night, every night and for every nap.    CPAP-Tips to starting with success:  Begin using your CPAP for short periods of time during the day while you watch TV or read.    Use CPAP every night and for every nap. Using it less often reduces the health benefits and makes it harder for your body to get used to it.    Make small adjustments to your mask, tubing, straps and headgear until you get the right fit. Tightening the mask may actually worsen the leak.  If it leaves significant marks on your face or irritates the bridge of your nose, it may not be the best mask for you.  Speak with the person who supplied the mask and consider trying other masks. Insurances will allow you to try different masks during the first month of starting CPAP.  Insurance also covers a new mask, hose and filter about every 6 months.    Use a saline nasal spray to ease mild nasal congestion. Neti-Pot or saline nasal rinses may also help. Nasal gel sprays can help reduce nasal dryness.  Biotene mouthwash can be helpful to protect your teeth if you experience frequent dry mouth.  Dry mouth may be a sign of air escaping out of your mouth or out of the mask in the case of a full face mask.  Speak with your provider if you expect that is the case.     Take a nasal decongestant to relieve more severe nasal or sinus congestion.  Do not use Afrin (oxymetazoline) nasal spray more than 3 days in a row.  Speak with your sleep doctor if your nasal congestion is chronic.    Use a heated  humidifier that fits your CPAP model to enhance your breathing comfort. Adjust the heat setting up if you get a dry nose or throat, down if you get condensation in the hose or mask.  Position the CPAP lower than you so that any condensation in the hose drains back into the machine rather than towards the mask.    Try a system that uses nasal pillows if traditional masks give you problems.    Clean your mask, tubing and headgear once a week. Make sure the equipment dries fully.    Regularly check and replace the filters for your CPAP unit and humidifier.    Work closely with your sleep provider and your CPAP supplier to make sure that you have the machine, mask and air pressure setting that works best for you. It is better to stop using it and call your provider to solve problems than to lay awake all night frustrated with the device.    BESIDES CPAP, WHAT OTHER THERAPIES ARE THERE?      Positioning Device  Positioning devices are generally used when sleep apnea is mild and only occurs on your back.This example shows a pillow that straps around the waist. It may be appropriate for those whose sleep study shows milder sleep apnea that occurs primarily when lying flat on one's back. Preliminary studies have shown benefit but effectiveness at home may need to be verified by a home sleep test. These devices are generally not covered by medical insurance.                      Oral Appliance  What is oral appliance therapy?  An oral appliance is a small acrylic device that fits over the upper and lower teeth or tongue (similar to an orthodontic retainer or a mouth guard). This device slightly advances the lower jaw or tongue, which moves the base of the tongue forward, opens the airway, improves breathing and can effectively treat snoring and obstructive sleep apnea sleep apnea. The appliance is fabricated and customized by a qualified dentist with experience in treating snoring and sleep apnea. Oral appliances are usually  well tolerated and have relatively high compliance by patients1, 2, 3.  When is an oral appliance indicated?  Oral appliance therapy is recommended as a first-line treatment for patients with primary snoring, mild sleep apnea, and for patients with moderate sleep apnea who prefer appliance therapy to use of CPAP4, 5. Severity of sleep apnea is determined by sleep testing and is based on the number of respiratory events per hour of sleep.   How successful is oral appliance therapy?  The success rate of oral appliance therapy in patients with mild sleep apnea is 75-80% while in patients with moderate sleep apnea it is 50-70%. The chance of success in patients with severe sleep apnea is 40-50%. The research also shows that oral appliances have a beneficial effect on the cardiovascular health of DAVON patients at the same magnitude as CPAP therapy7.  Oral appliances should be a second-line treatment in cases of severe sleep apnea, but if not completely successful then a combination therapy utilizing CPAP plus oral appliance therapy may be effective. Oral appliances tend to be effective in a broad range of patients although studies show that the patients who have the highest success are females, younger patients, those with milder disease, and less severe obesity. 3, 6.   The chances of success are lower in patients who have more severe DAVON, are older, and those who are morbidly obese.     Example of an oral appliance   Finding a dentist that practices dental sleep medicine  Specific training is available through the American Academy of Dental Sleep Medicine for dentists interested in working in the field of sleep. To find a dentist who is educated in the field of sleep and the use of oral appliances, near you, visit the Web site of the American Academy of Dental Sleep Medicine; also see   http://www.accpstorage.org/newOrganization/patients/oralAppliances.pdf  To search for a dentist certified in these  practices:  Http://aadsm.org/FindADentist.aspx?1  1. Cherelle, et al. Objectively measured vs self-reported compliance during oral appliance therapy for sleep-disordered breathing. Chest 2013; 144(5): 3426-3726.  2. Calin et al. Objective measurement of compliance during oral appliance therapy for sleep-disordered breathing. Thorax 2013; 68(1): 91-96.  3. Paradise et al. Mandibular advancement devices in 620 men and women with DAVON and snoring: tolerability and predictors of treatment success. Chest 2004; 125: 3767-5686.  4. Bipin, et al. Oral appliances for snoring and DAVON: a review. Sleep 2006; 29: 244-262.  5. Sue et al. Oral appliance treatment for DAVON: an update. J Clin Sleep Med 2014; 10(2): 215-227.  6. Marcy et al. Predictors of OSAH treatment outcome. J Dent Res 2007; 86: 3215-1296.    Nasal Valves                 Nasal valves may not be effective if you have frequent nasal congestion or have difficulty breathing through your nose. They may be an option for mild apnea if other options are not well tolerated. The efficacy of these devices is generally less than CPAP or oral appliances.      Weight Loss:    Weight management is a personal decision.  If you are interested in exploring weight loss strategies, the following discussion covers the impact on weight loss on sleep apnea and the approaches that may be successful.    Weight loss decreases severity of sleep apnea in most people with obesity. For those with mild obesity who have developed snoring with weight gain, even 15-30 pound weight loss can improve and occasionally eliminate sleep apnea.  Structured and life-long dietary and health habits are necessary to lose weight and keep healthier weight levels.     Though there may be significant health benefits from weight loss, long-term weight loss is very difficult to achieve- studies show success with dietary management in less than 10% of people. In addition, substantial weight  loss may require years of dietary control and may be difficult if patients have severe obesity. In these cases, surgical management may be considered.  Finally, older individuals who have tolerated obesity without health complications may be less likely to benefit from weight loss strategies.    Your BMI is Body mass index is 31.44 kg/(m^2).  Body mass index (BMI) is one way to tell whether you are at a healthy weight, overweight, or obese. It measures your weight in relation to your height.  A BMI of 18.5 to 24.9 is in the healthy range. A person with a BMI of 25 to 29.9 is considered overweight, and someone with a BMI of 30 or greater is considered obese. More than two-thirds of American adults are considered overweight or obese.  Being overweight or obese increases the risk for further weight gain. Excess weight may lead to heart disease and diabetes.  Creating and following plans for healthy eating and physical activity may help you improve your health.  Weight control is part of healthy lifestyle and includes exercise, emotional health, and healthy eating habits. Careful eating habits lifelong are the mainstay of weight control. Though there are significant health benefits from weight loss, long-term weight loss with diet alone may be very difficult to achieve- studies show long-term success with dietary management in less than 10% of people. Attaining a healthy weight may be especially difficult to achieve in those with severe obesity. In some cases, medications, devices and surgical management might be considered.  What can you do?  If you are overweight or obese and are interested in methods for weight loss, you should discuss this with your provider.     Consider reducing daily calorie intake by 500 calories.     Keep a food journal.     Avoiding skipping meals, consider cutting portions instead.    Diet combined with exercise helps maintain muscle while optimizing fat loss. Strength training is  particularly important for building and maintaining muscle mass. Exercise helps reduce stress, increase energy, and improves fitness. Increasing exercise without diet control, however, may not burn enough calories to loose weight.       Start walking three days a week 10-20 minutes at a time    Work towards walking thirty minutes five days a week     Eventually, increase the speed of your walking for 1-2 minutes at time    In addition, we recommend that you review healthy lifestyles and methods for weight loss available through the National Institutes of Health patient information sites:  http://win.niddk.nih.gov/publications/index.htm    And look into health and wellness programs that may be available through your health insurance provider, employer, local community [place], or [employer].    Weight management plan: Patient was referred to their PCP to discuss a diet and exercise plan.    Surgery:    Upper Airway Surgery for DAVON  Surgery for DAVON is a second-line treatment option in the management of sleep apnea.  Surgery should be considered for patients who are having a difficult time tolerating CPAP.    Surgery for DAVON is directed at areas that are responsible for narrowing or complete obstruction of the airway during sleep.  There are a wide range of procedures available to enlarge and/or stabilize the airway to prevent blockage of breathing in the three major areas where it can occur: the palate, tongue, and nasal regions.  Successful surgical treatment depends on the accurate identification of the factors responsible for obstructive sleep apnea in each person.  A personalized approach is required because there is no single treatment that works well for everyone.  Because of anatomic variation, consultation with an examination by a sleep surgeon is a critical first step in determining what surgical options are best for each patient.  In some cases, examination during sedation may be recommended in order to guide  the selection of procedures.  Patients will be counseled about risks and benefits as well as the typical recovery course after surgery. Surgery is typically not a cure for a person s DAVON.  However, surgery will often significantly improve one s DAVON severity (termed  success rate ).  Even in the absence of a cure, surgery will decrease the cardiovascular risk associated with OSA7; improve overall quality of life8 (sleepiness, functionality, sleep quality, etc).          Palate Procedures:  Patients with DAVON often have narrowing of their airway in the region of their tonsils and uvula.  The goals of palate procedures are to widen the airway in this region as well as to help the tissues resist collapse.  Modern palate procedure techniques focus on tissue conservation and soft tissue rearrangement, rather than tissue removal.  Often the uvula is preserved in this procedure. Residual sleep apnea is common in patient after pharyngoplasty with an average reduction in sleep apnea events of 33%2.      Tongue Procedures:  While patients are awake, the muscles that surround the throat are active and keep this region open for breathing. These muscles relax during sleep, allowing the tongue and other structures to collapse and block breathing.  There are several different tongue procedures available.  Selection of a tongue base procedure depends on characteristics seen on physical exam.  Generally, procedures are aimed at removing bulky tissues in this area or preventing the back of the tongue from falling back during sleep.  Success rates for tongue surgery range from 50-62%3.    Hypoglossal Nerve Stimulation:  Hypoglossal nerve stimulation has recently received approval from the [place] Food and Drug Administration for the treatment of obstructive sleep apnea.  This is based on research showing that the system was safe and effective in treating sleep apnea6.  Results showed that the median AHI score decreased 68%, from  29.3 to 9.0. This therapy uses an implant system that senses breathing patterns and delivers mild stimulation to airway muscles, which keeps the airway open during sleep.  The system consists of three fully implanted components: a small generator (similar in size to a pacemaker), a breathing sensor, and a stimulation lead.  Using a small handheld remote, a patient turns the therapy on before bed and off upon awakening.    Candidates for this device must be greater than 22 years of age, have moderate to severe DAVON (AHI between 20-65), BMI less than 32, have tried CPAP/oral appliance without success, and have appropriate upper airway anatomy (determined by a sleep endoscopy performed by Dr. Marie).    Hypoglossal Nerve Stimulation Pathway:    The sleep surgeon s office will work with the patient through the insurance prior-authorization process (including communications and appeals).    Nasal Procedures:  Nasal obstruction can interfere with nasal breathing during the day and night.  Studies have shown that relief of nasal obstruction can improve the ability of some patients to tolerate positive airway pressure therapy for obstructive sleep apnea1.  Treatment options include medications such as nasal saline, topical corticosteroid and antihistamine sprays, and oral medications such as antihistamines or decongestants. Non-surgical treatments can include external nasal dilators for selected patients. If these are not successful by themselves, surgery can improve the nasal airway either alone or in combination with these other options.      Combination Procedures:  Combination of surgical procedures and other treatments may be recommended, particularly if patients have more than one area of narrowing or persistent positional disease.  The success rate of combination surgery ranges from 66-80%2,3.      1. Rajendra BARNES. The Role of the Nose in Snoring and Obstructive Sleep Apnoea: An Update.  Eur Arch Otorhinolaryngol. 2011;  268: 1365-73.  2.  Alicia SM; Pat JA; Jeniffer JR; Pallanch JF; Sis MB; Lisa SG; Daya DEY. Surgical modifications of the upper airway for obstructive sleep apnea in adults: a systematic review and meta-analysis. SLEEP 2010;33(10):2193-5866. Vladimir OQUENDO. Hypopharyngeal surgery in obstructive sleep apnea: an evidence-based medicine review.  Arch Otolaryngol Head Neck Surg. 2006 Feb;132(2):206-13.  3. Rj HOWELLH1, Kelly Y, Richi SYBIL. The efficacy of anatomically based multilevel surgery for obstructive sleep apnea. Otolaryngol Head Neck Surg. 2003 Oct;129(4):327-35.  4. Kezirian E, Goldberg A. Hypopharyngeal Surgery in Obstructive Sleep Apnea: An Evidence-Based Medicine Review. Arch Otolaryngol Head Neck Surg. 2006 Feb;132(2):206-13.  5. Romina CYR et al. Upper-Airway Stimulation for Obstructive Sleep Apnea.  N Engl J Med. 2014 Jan 9;370(2):139-49.  6. Pejoe Y et al. Increased Incidence of Cardiovascular Disease in Middle-aged Men with Obstructive Sleep Apnea. Am J Respir Crit Care Med; 2002 166: 159-165  7. Beckett EM et al. Studying Life Effects and Effectiveness of Palatopharyngoplasty (SLEEP) study: Subjective Outcomes of Isolated Uvulopalatopharyngoplasty. Otolaryngol Head Neck Surg. 2011; 144: 623-631.  8.   Your blood pressure was checked while you were in clinic today.  Please read the guidelines below about what these numbers mean and what you should do about them.  Your systolic blood pressure is the top number.  This is the pressure when the heart is pumping.  Your diastolic blood pressure is the bottom number.  This is the pressure in between beats.  If your systolic blood pressure is less than 120 and your diastolic blood pressure is less than 80, then your blood pressure is normal. There is nothing more that you need to do about it  If your systolic blood pressure is 120-139 or your diastolic blood pressure is 80-89, your blood pressure may be higher than it should be.  You should have your blood  pressure re-checked within a year by a primary care provider.  If your systolic blood pressure is 140 or greater or your diastolic blood pressure is 90 or greater, you may have high blood pressure.  High blood pressure is treatable, but if left untreated over time it can put you at risk for heart attack, stroke, or kidney failure.  You should have your blood pressure re-checked by a primary care provider within the next four weeks.    Restless Leg Syndrome:    Based on the information that you provided today you are likely to have restless leg syndrome that may be interfering with your sleep.  Treatment of restless leg syndrome usually depends on how much it is bothering you.  If it is a major problem then you might want to do something about it.    Here are some treatment options that you might want to consider:   Behavior Changes:   Reduce or eliminate caffeine and alcohol products   Increase the amount of stretching that you do, particularly before bedtime   Sometimes a leg message can be helpful   Some people find that a warm bath or shower in the evening is helpful    We recommend:  Ferritin, CBC and iron level ordered    Low ferritin (below 50-75 ng/ml) can cause motor restlessness. Your iron [ferritin] level is in a low range that may be causing restlessness.     If ferritin is low with anemia, we recommend to following up your primary care doctor to investigate the source of iron loss or bleeding, including the need for colonoscopy/upper endoscopy.

## 2018-10-01 NOTE — NURSING NOTE
Chief Complaint   Patient presents with     Consult     Excessive daytime sleepyness,        Initial BP (!) 89/58  Pulse 87  Resp 14  Ht 1.524 m (5')  Wt 73 kg (161 lb)  SpO2 98%  BMI 31.44 kg/m2 Estimated body mass index is 31.44 kg/(m^2) as calculated from the following:    Height as of this encounter: 1.524 m (5').    Weight as of this encounter: 73 kg (161 lb).    Medication Reconciliation: complete    Neck circumference: 13.25 inches / 34 centimeters.  Ess 23    DME: ABBEY Higginbotham LPN/YASEMIN

## 2018-10-01 NOTE — MR AVS SNAPSHOT
"              After Visit Summary   10/1/2018    Cristela Smith    MRN: [id number]           Patient Information     Date Of Birth          1965        Visit Information        Provider Department      10/1/2018 11:00 AM Luis Alberto Pro MD [facility] [place]        Today's Diagnoses     Sleep disturbance    -  1    Excessive daytime sleepiness        Restless legs syndrome (RLS)        Iron metabolism disorder          Care Instructions    MY TREATMENT INFORMATION FOR SLEEP DISTURBANCE-  Cristela Smith    DOCTOR : Luis Alberto Pro MD  SLEEP CENTER :  [place]    MY CONTACT NUMBER:[phone]        If I haven't had a sleep study yet, what can I expect?  A personal story from [employer]  https://www.Tunii.com/watch?v=AxPLmlRpnCs    Suspected sleep apnea: Sleep study ordered. Other cause of excessive daytime sleepiness are medications, please follow up Dr. Beyer and Dr. De Los Santos for medication review. Your blood pressure is low today, if symptomatic, follow up Dr. De Los Santos or ED for evaluation.    Follow up in sleep clinic 1-2 weeks after sleep study to discuss results of sleep study and treatment options.    Patient was advised not to drive if drowsy or sleepy.    Frequently asked questions:  1. What is Obstructive Sleep Apnea (DAVON)? DAVON is the most common type of sleep apnea. Apnea literally means, \"without breath.\" It is characterized by repetitive pauses in breathing, despite continued effort to breathe, and is usually associated with a reduction in blood oxygen saturation. Apneas can last 10 to over 60 seconds. It is caused by narrowing or collapse of the upper airway as muscles relax during sleep. Severity of sleep apnea is determined by frequency of breathing events and their effect on your sleep and oxygen levels determined during sleep testing.   2. What are the consequences of DAVON? Symptoms include: daytime sleepiness- possibly increasing the risk of falling asleep while driving, " unrefreshing/restless sleep, snoring, insomnia, waking frequently to urinate, waking with heartburn or reflux, reduced concentration and memory, and morning headaches. Other health consequences may include development of high blood pressure and other cardiovascular disease in persons who are susceptible. Untreated DAVON  can contribute to heart disease, stroke and diabetes.   3. What are the treatment options? In most situations, sleep apnea is a lifelong disease that must be managed with daily therapy. Medications are not effective for sleep apnea and surgery is generally not performed until other therapies have been tried. Therapy is usually tailored to the individual patient based on many factors including your wishes as well as severity of sleep apnea and severity of obesity. Continuous Positive Airway (CPAP) is the most reliable treatment. An oral device to hold your jaw forward is usually the next most reliable option. Other options include postioning devices (to keep you off your back), weight loss, and surgery including a tongue pacing device. There is more detail about some of these options below.    Central sleep apnea is a disorder in which your breathing repeatedly stops and starts during sleep.  Central sleep apnea occurs because your brain doesn't send proper signals to the muscles that control your breathing. This condition is different from obstructive sleep apnea, in which you can't breathe normally because of upper airway obstruction. Central sleep apnea is less common than obstructive sleep apnea.  Central sleep apnea may occur as a result of other conditions, such as heart failure and stroke. Sleeping at a high altitude and pain medications also may cause central sleep apnea.        1. CPAP-  WHAT DOES IT DO AND HOW CAN I LEARN TO WEAR IT?                               BEFORE I START, CAN I WATCH A MOVIE TO GET A PLAN ON HOW TO USE CPAP?  https://www.Vopium.com/watch?t=t0O61za983F      Continuous  positive airway pressure, or CPAP, is the most effective treatment for obstructive sleep apnea. It works by blowing room air, through a mask, to hold your throat open. A decision to use CPAP is a major step forward in the pursuit of a healthier life. The successful use of CPAP will help you breathe easier, sleep better and live healthier. You can choose CPAP equipment from any durable medical equipment provider that meets your needs.  Using CPAP can be a positive experience if you keep these vallecillo points in mind:  1. Commitment  CPAP is not a quick fix for your problem. It involves a long-term commitment to improve your sleep and your health.    2. Communication  Stay in close communication with both your sleep doctor and your CPAP supplier. Ask lots of questions and seek help when you need it.    3. Consistency  Use CPAP all night, every night and for every nap. You will receive the maximum health benefits from CPAP when you use it every time that you sleep. This will also make it easier for your body to adjust to the treatment.    4. Correction  The first machine and mask that you try may not be the best ones for you. Work with your sleep doctor and your CPAP supplier to make corrections to your equipment selection. Ask about trying a different type of machine or mask if you have ongoing problems. Make sure that your mask is a good fit and learn to use your equipment properly.    5. Challenge  Tell a family member or close friend to ask you each morning if you used your CPAP the previous night. Have someone to challenge you to give it your best effort.    6. Connection   Your adjustment to CPAP will be easier if you are able to connect with others who use the same treatment. Ask your sleep doctor if there is a support group in your area for people who have sleep apnea, or look for one on the Internet.  7. Comfort   Increase your level of comfort by using a saline spray, decongestant or heated humidifier if CPAP  "irritates your nose, mouth or throat. Use your unit's \"ramp\" setting to slowly get used to the air pressure level. There may be soft pads you can buy that will fit over your mask straps. Look on www.CPAP.com for accessories that can help make CPAP use more comfortable.  8. Cleaning   Clean your mask, tubing and headgear on a regular basis. Put this time in your schedule so that you don't forget to do it. Check and replace the filters for your CPAP unit and humidifier.    9. Completion   Although you are never finished with CPAP therapy, you should reward yourself by celebrating the completion of your first month of treatment. Expect this first month to be your hardest period of adjustment. It will involve some trial and error as you find the machine, mask and pressure settings that are right for you.    10. Continuation  After your first month of treatment, continue to make a daily commitment to use your CPAP all night, every night and for every nap.    CPAP-Tips to starting with success:  Begin using your CPAP for short periods of time during the day while you watch TV or read.    Use CPAP every night and for every nap. Using it less often reduces the health benefits and makes it harder for your body to get used to it.    Make small adjustments to your mask, tubing, straps and headgear until you get the right fit. Tightening the mask may actually worsen the leak.  If it leaves significant marks on your face or irritates the bridge of your nose, it may not be the best mask for you.  Speak with the person who supplied the mask and consider trying other masks. Insurances will allow you to try different masks during the first month of starting CPAP.  Insurance also covers a new mask, hose and filter about every 6 months.    Use a saline nasal spray to ease mild nasal congestion. Neti-Pot or saline nasal rinses may also help. Nasal gel sprays can help reduce nasal dryness.  Biotene mouthwash can be helpful to protect " your teeth if you experience frequent dry mouth.  Dry mouth may be a sign of air escaping out of your mouth or out of the mask in the case of a full face mask.  Speak with your provider if you expect that is the case.     Take a nasal decongestant to relieve more severe nasal or sinus congestion.  Do not use Afrin (oxymetazoline) nasal spray more than 3 days in a row.  Speak with your sleep doctor if your nasal congestion is chronic.    Use a heated humidifier that fits your CPAP model to enhance your breathing comfort. Adjust the heat setting up if you get a dry nose or throat, down if you get condensation in the hose or mask.  Position the CPAP lower than you so that any condensation in the hose drains back into the machine rather than towards the mask.    Try a system that uses nasal pillows if traditional masks give you problems.    Clean your mask, tubing and headgear once a week. Make sure the equipment dries fully.    Regularly check and replace the filters for your CPAP unit and humidifier.    Work closely with your sleep provider and your CPAP supplier to make sure that you have the machine, mask and air pressure setting that works best for you. It is better to stop using it and call your provider to solve problems than to lay awake all night frustrated with the device.    BESIDES CPAP, WHAT OTHER THERAPIES ARE THERE?      Positioning Device  Positioning devices are generally used when sleep apnea is mild and only occurs on your back.This example shows a pillow that straps around the waist. It may be appropriate for those whose sleep study shows milder sleep apnea that occurs primarily when lying flat on one's back. Preliminary studies have shown benefit but effectiveness at home may need to be verified by a home sleep test. These devices are generally not covered by medical insurance.                      Oral Appliance  What is oral appliance therapy?  An oral appliance is a small acrylic device that fits  over the upper and lower teeth or tongue (similar to an orthodontic retainer or a mouth guard). This device slightly advances the lower jaw or tongue, which moves the base of the tongue forward, opens the airway, improves breathing and can effectively treat snoring and obstructive sleep apnea sleep apnea. The appliance is fabricated and customized by a qualified dentist with experience in treating snoring and sleep apnea. Oral appliances are usually well tolerated and have relatively high compliance by patients1, 2, 3.  When is an oral appliance indicated?  Oral appliance therapy is recommended as a first-line treatment for patients with primary snoring, mild sleep apnea, and for patients with moderate sleep apnea who prefer appliance therapy to use of CPAP4, 5. Severity of sleep apnea is determined by sleep testing and is based on the number of respiratory events per hour of sleep.   How successful is oral appliance therapy?  The success rate of oral appliance therapy in patients with mild sleep apnea is 75-80% while in patients with moderate sleep apnea it is 50-70%. The chance of success in patients with severe sleep apnea is 40-50%. The research also shows that oral appliances have a beneficial effect on the cardiovascular health of DAVON patients at the same magnitude as CPAP therapy7.  Oral appliances should be a second-line treatment in cases of severe sleep apnea, but if not completely successful then a combination therapy utilizing CPAP plus oral appliance therapy may be effective. Oral appliances tend to be effective in a broad range of patients although studies show that the patients who have the highest success are females, younger patients, those with milder disease, and less severe obesity. 3, 6.   The chances of success are lower in patients who have more severe DAVON, are older, and those who are morbidly obese.     Example of an oral appliance   Finding a dentist that practices dental sleep  medicine  Specific training is available through the American Academy of Dental Sleep Medicine for dentists interested in working in the field of sleep. To find a dentist who is educated in the field of sleep and the use of oral appliances, near you, visit the Web site of the American Academy of Dental Sleep Medicine; also see   http://www.accpstorage.org/newOrganization/patients/oralAppliances.pdf  To search for a dentist certified in these practices:  Http://aadsm.org/FindADentist.aspx?1  1. Cherelle et al. Objectively measured vs self-reported compliance during oral appliance therapy for sleep-disordered breathing. Chest 2013; 144(5): 3898-6612.  2. Calin et al. Objective measurement of compliance during oral appliance therapy for sleep-disordered breathing. Thorax 2013; 68(1): 91-96.  3. Paradise et al. Mandibular advancement devices in 620 men and women with DAVON and snoring: tolerability and predictors of treatment success. Chest 2004; 125: 2017-2878.  4. Bipin et al. Oral appliances for snoring and DAVON: a review. Sleep 2006; 29: 244-262.  5. Sue, et al. Oral appliance treatment for DAVON: an update. J Clin Sleep Med 2014; 10(2): 215-227.  6. Marcy et al. Predictors of OSAH treatment outcome. J Dent Res 2007; 86: 7396-5743.    Nasal Valves                 Nasal valves may not be effective if you have frequent nasal congestion or have difficulty breathing through your nose. They may be an option for mild apnea if other options are not well tolerated. The efficacy of these devices is generally less than CPAP or oral appliances.      Weight Loss:    Weight management is a personal decision.  If you are interested in exploring weight loss strategies, the following discussion covers the impact on weight loss on sleep apnea and the approaches that may be successful.    Weight loss decreases severity of sleep apnea in most people with obesity. For those with mild obesity who have developed  snoring with weight gain, even 15-30 pound weight loss can improve and occasionally eliminate sleep apnea.  Structured and life-long dietary and health habits are necessary to lose weight and keep healthier weight levels.     Though there may be significant health benefits from weight loss, long-term weight loss is very difficult to achieve- studies show success with dietary management in less than 10% of people. In addition, substantial weight loss may require years of dietary control and may be difficult if patients have severe obesity. In these cases, surgical management may be considered.  Finally, older individuals who have tolerated obesity without health complications may be less likely to benefit from weight loss strategies.    Your BMI is Body mass index is 31.44 kg/(m^2).  Body mass index (BMI) is one way to tell whether you are at a healthy weight, overweight, or obese. It measures your weight in relation to your height.  A BMI of 18.5 to 24.9 is in the healthy range. A person with a BMI of 25 to 29.9 is considered overweight, and someone with a BMI of 30 or greater is considered obese. More than two-thirds of American adults are considered overweight or obese.  Being overweight or obese increases the risk for further weight gain. Excess weight may lead to heart disease and diabetes.  Creating and following plans for healthy eating and physical activity may help you improve your health.  Weight control is part of healthy lifestyle and includes exercise, emotional health, and healthy eating habits. Careful eating habits lifelong are the mainstay of weight control. Though there are significant health benefits from weight loss, long-term weight loss with diet alone may be very difficult to achieve- studies show long-term success with dietary management in less than 10% of people. Attaining a healthy weight may be especially difficult to achieve in those with severe obesity. In some cases, medications,  devices and surgical management might be considered.  What can you do?  If you are overweight or obese and are interested in methods for weight loss, you should discuss this with your provider.     Consider reducing daily calorie intake by 500 calories.     Keep a food journal.     Avoiding skipping meals, consider cutting portions instead.    Diet combined with exercise helps maintain muscle while optimizing fat loss. Strength training is particularly important for building and maintaining muscle mass. Exercise helps reduce stress, increase energy, and improves fitness. Increasing exercise without diet control, however, may not burn enough calories to loose weight.       Start walking three days a week 10-20 minutes at a time    Work towards walking thirty minutes five days a week     Eventually, increase the speed of your walking for 1-2 minutes at time    In addition, we recommend that you review healthy lifestyles and methods for weight loss available through the National Institutes of Health patient information sites:  http://win.niddk.nih.gov/publications/index.htm    And look into health and wellness programs that may be available through your health insurance provider, employer, local community center, or karen club.    Weight management plan: Patient was referred to their PCP to discuss a diet and exercise plan.    Surgery:    Upper Airway Surgery for DAVON  Surgery for DAVON is a second-line treatment option in the management of sleep apnea.  Surgery should be considered for patients who are having a difficult time tolerating CPAP.    Surgery for DAVON is directed at areas that are responsible for narrowing or complete obstruction of the airway during sleep.  There are a wide range of procedures available to enlarge and/or stabilize the airway to prevent blockage of breathing in the three major areas where it can occur: the palate, tongue, and nasal regions.  Successful surgical treatment depends on the accurate  identification of the factors responsible for obstructive sleep apnea in each person.  A personalized approach is required because there is no single treatment that works well for everyone.  Because of anatomic variation, consultation with an examination by a sleep surgeon is a critical first step in determining what surgical options are best for each patient.  In some cases, examination during sedation may be recommended in order to guide the selection of procedures.  Patients will be counseled about risks and benefits as well as the typical recovery course after surgery. Surgery is typically not a cure for a person s DAVON.  However, surgery will often significantly improve one s DAVON severity (termed  success rate ).  Even in the absence of a cure, surgery will decrease the cardiovascular risk associated with OSA7; improve overall quality of life8 (sleepiness, functionality, sleep quality, etc).          Palate Procedures:  Patients with DAVON often have narrowing of their airway in the region of their tonsils and uvula.  The goals of palate procedures are to widen the airway in this region as well as to help the tissues resist collapse.  Modern palate procedure techniques focus on tissue conservation and soft tissue rearrangement, rather than tissue removal.  Often the uvula is preserved in this procedure. Residual sleep apnea is common in patient after pharyngoplasty with an average reduction in sleep apnea events of 33%2.      Tongue Procedures:  While patients are awake, the muscles that surround the throat are active and keep this region open for breathing. These muscles relax during sleep, allowing the tongue and other structures to collapse and block breathing.  There are several different tongue procedures available.  Selection of a tongue base procedure depends on characteristics seen on physical exam.  Generally, procedures are aimed at removing bulky tissues in this area or preventing the back of the tongue  from falling back during sleep.  Success rates for tongue surgery range from 50-62%3.    Hypoglossal Nerve Stimulation:  Hypoglossal nerve stimulation has recently received approval from the [place] Food and Drug Administration for the treatment of obstructive sleep apnea.  This is based on research showing that the system was safe and effective in treating sleep apnea6.  Results showed that the median AHI score decreased 68%, from 29.3 to 9.0. This therapy uses an implant system that senses breathing patterns and delivers mild stimulation to airway muscles, which keeps the airway open during sleep.  The system consists of three fully implanted components: a small generator (similar in size to a pacemaker), a breathing sensor, and a stimulation lead.  Using a small handheld remote, a patient turns the therapy on before bed and off upon awakening.    Candidates for this device must be greater than 22 years of age, have moderate to severe DAVON (AHI between 20-65), BMI less than 32, have tried CPAP/oral appliance without success, and have appropriate upper airway anatomy (determined by a sleep endoscopy performed by Dr. Marie).    Hypoglossal Nerve Stimulation Pathway:    The sleep surgeon s office will work with the patient through the insurance prior-authorization process (including communications and appeals).    Nasal Procedures:  Nasal obstruction can interfere with nasal breathing during the day and night.  Studies have shown that relief of nasal obstruction can improve the ability of some patients to tolerate positive airway pressure therapy for obstructive sleep apnea1.  Treatment options include medications such as nasal saline, topical corticosteroid and antihistamine sprays, and oral medications such as antihistamines or decongestants. Non-surgical treatments can include external nasal dilators for selected patients. If these are not successful by themselves, surgery can improve the nasal airway either  alone or in combination with these other options.      Combination Procedures:  Combination of surgical procedures and other treatments may be recommended, particularly if patients have more than one area of narrowing or persistent positional disease.  The success rate of combination surgery ranges from 66-80%2,3.      1. Rajendra BARNES. The Role of the Nose in Snoring and Obstructive Sleep Apnoea: An Update.  Eur Arch Otorhinolaryngol. 2011; 268: 1365-73.  2.  Alicia SM; Pat JA; Jeniffer JR; Pallanch JF; Sis MB; Lisa SG; Daya DEY. Surgical modifications of the upper airway for obstructive sleep apnea in adults: a systematic review and meta-analysis. SLEEP 2010;33(10):2296-1128. Vladimir OQUENDO. Hypopharyngeal surgery in obstructive sleep apnea: an evidence-based medicine review.  Arch Otolaryngol Head Neck Surg. 2006 Feb;132(2):206-13.  3. Rj YH1, Kelly Y, Richi SYBIL. The efficacy of anatomically based multilevel surgery for obstructive sleep apnea. Otolaryngol Head Neck Surg. 2003 Oct;129(4):327-35.  4. Vladimir OQUENDO, Goldberg A. Hypopharyngeal Surgery in Obstructive Sleep Apnea: An Evidence-Based Medicine Review. Arch Otolaryngol Head Neck Surg. 2006 Feb;132(2):206-13.  5. Romina CYR et al. Upper-Airway Stimulation for Obstructive Sleep Apnea.  N Engl J Med. 2014 Jan 9;370(2):139-49.  6. Jared Y et al. Increased Incidence of Cardiovascular Disease in Middle-aged Men with Obstructive Sleep Apnea. Am J Respir Crit Care Med; 2002 166: 159-165  7. Beckett EM et al. Studying Life Effects and Effectiveness of Palatopharyngoplasty (SLEEP) study: Subjective Outcomes of Isolated Uvulopalatopharyngoplasty. Otolaryngol Head Neck Surg. 2011; 144: 623-631.  8.   Your blood pressure was checked while you were in clinic today.  Please read the guidelines below about what these numbers mean and what you should do about them.  Your systolic blood pressure is the top number.  This is the pressure when the heart is pumping.  Your  diastolic blood pressure is the bottom number.  This is the pressure in between beats.  If your systolic blood pressure is less than 120 and your diastolic blood pressure is less than 80, then your blood pressure is normal. There is nothing more that you need to do about it  If your systolic blood pressure is 120-139 or your diastolic blood pressure is 80-89, your blood pressure may be higher than it should be.  You should have your blood pressure re-checked within a year by a primary care provider.  If your systolic blood pressure is 140 or greater or your diastolic blood pressure is 90 or greater, you may have high blood pressure.  High blood pressure is treatable, but if left untreated over time it can put you at risk for heart attack, stroke, or kidney failure.  You should have your blood pressure re-checked by a primary care provider within the next four weeks.    Restless Leg Syndrome:    Based on the information that you provided today you are likely to have restless leg syndrome that may be interfering with your sleep.  Treatment of restless leg syndrome usually depends on how much it is bothering you.  If it is a major problem then you might want to do something about it.    Here are some treatment options that you might want to consider:   Behavior Changes:   Reduce or eliminate caffeine and alcohol products   Increase the amount of stretching that you do, particularly before bedtime   Sometimes a leg message can be helpful   Some people find that a warm bath or shower in the evening is helpful    We recommend:  Ferritin, CBC and iron level ordered    Low ferritin (below 50-75 ng/ml) can cause motor restlessness. Your iron [ferritin] level is in a low range that may be causing restlessness.     If ferritin is low with anemia, we recommend to following up your primary care doctor to investigate the source of iron loss or bleeding, including the need for colonoscopy/upper endoscopy.                       Follow-ups after your visit        Future tests that were ordered for you today     Open Future Orders        Priority Expected Expires Ordered    Comprehensive Sleep Study Routine  3/30/2019 10/1/2018    Ferritin Routine  11/30/2018 10/1/2018    Iron and Iron Binding Capacity Routine  11/30/2018 10/1/2018    CBC with platelets differential Routine  11/30/2018 10/1/2018            Who to contact     If you have questions or need follow up information about today's clinic visit or your schedule please contact [facility] directly at [phone].  Normal or non-critical lab and imaging results will be communicated to you by MyChart, letter or phone within 4 business days after the clinic has received the results. If you do not hear from us within 7 days, please contact the clinic through MyChart or phone. If you have a critical or abnormal lab result, we will notify you by phone as soon as possible.  Submit refill requests through [employer] or call your pharmacy and they will forward the refill request to us. Please allow 3 business days for your refill to be completed.          Additional Information About Your Visit        Care EveryWhere ID     This is your Care EveryWhere ID. This could be used by other organizations to access your [place] medical records  [id number]        Your Vitals Were     Pulse Respirations Height Pulse Oximetry BMI (Body Mass Index)       87 14 1.524 m (5') 98% 31.44 kg/m2        Blood Pressure from Last 3 Encounters:   10/01/18 (!) 89/58   08/22/18 (!) 86/60   08/20/18 128/78    Weight from Last 3 Encounters:   10/01/18 73 kg (161 lb)   08/22/18 74.4 kg (164 lb 1.6 oz)   05/09/18 75.7 kg (166 lb 12.8 oz)              We Performed the Following     SLEEP EVALUATION & MANAGEMENT REFERRAL - ADULT -[facility]  [phone] (Age 18 and up)        Primary Care Provider Office Phone # Fax #    Halle De Los Santos [phone]        [address] [place] [address]        Equal Access to Services     MARY SUAREZ : Hadii aad ku hademeliaby Goodman, wasidneyda osiris, qaybta harmanchhayanadia lopez, daniel heardamishchase watson. So [facility] [phone].    ATENCIÓN: Si habla español, tiene a rowland disposición servicios gratuitos de asistencia lingüística. [facility] [phone].    We comply with applicable federal civil rights laws and [place] laws. We do not discriminate on the basis of race, color, national origin, age, disability, sex, sexual orientation, or gender identity.            Thank you!     Thank you for choosing [place] [facility]  for your care. Our goal is always to provide you with excellent care. Hearing back from our patients is one way we can continue to improve our services. Please take a few minutes to complete the written survey that you may receive in the mail after your visit with us. Thank you!             Your Updated Medication List - Protect others around you: Learn how to safely use, store and throw away your medicines at www.disposemymeds.org.          This list is accurate as of 10/1/18 12:05 PM.  Always use your most recent med list.                   Brand Name Dispense Instructions for use Diagnosis    albuterol 108 (90 Base) MCG/ACT inhaler    PROAIR HFA/PROVENTIL HFA/VENTOLIN HFA    1 Inhaler    Inhale 2 puffs into the lungs every 6 hours as needed for shortness of breath / dyspnea or wheezing    Viral URI with cough       atorvastatin 20 MG tablet    LIPITOR    90 tablet    Take 1 tablet (20 mg) by mouth At Bedtime    Hyperlipidemia LDL goal <130       buPROPion 150 MG 24 hr tablet    WELLBUTRIN XL    30 tablet    Take 1 tablet (150 mg) by mouth every morning    Tobacco use       butalbital-acetaminophen-caffeine -40 MG per tablet    FIORICET/ESGIC     Take 1-2 tablets by mouth 4 times daily as needed        clonazePAM 0.5 MG tablet    klonoPIN     Take 0.5 mg by mouth  2 times daily as needed for anxiety        cyanocobalamin 2500 MCG sublingual tablet    VITAMIN B-12    90 tablet    Place 2,500 mcg under the tongue daily    Vitamin B12 deficiency (non anemic)       EPINEPHrine 0.3 MG/0.3ML injection 2-pack    EPIPEN/ADRENACLICK/or ANY BX GENERIC EQUIV    0.6 mL    Inject 0.3 mLs (0.3 mg) into the muscle as needed for anaphylaxis    Reaction to insect bite       estropipate 1.5 MG tablet    OGEN    90 tablet    Take 1 tablet by mouth once daily.    Menopausal syndrome (hot flashes)       fluticasone 50 MCG/ACT spray    FLONASE    1 Bottle    Spray 1-2 sprays into both nostrils daily as needed for rhinitis or allergies    Other chronic rhinitis       hydrOXYzine 50 MG tablet    ATARAX    180 tablet    Take 2 tablets (100 mg) by mouth every 6 hours as needed for anxiety    Anxiety       hyoscyamine 0.125 MG tablet    ANASPAZ/LEVSIN    40 tablet    Take 1-2 tablets (125-250 mcg) by mouth every 4 hours as needed for cramping    Irritable bowel syndrome without diarrhea       MOBIC 15 MG tablet   Generic drug:  meloxicam     30 tablet    Take 1 tablet (15 mg) by mouth daily        MS CONTIN 15 MG 12 hr tablet   Generic drug:  morphine      Take 15 mg by mouth every 12 hours        naloxegol 25 MG Tabs tablet     30 tablet    Take 1 tablet (25 mg) by mouth every morning (before breakfast)    Drug-induced constipation, Chronic pain syndrome, Chronic constipation       naloxone 0.4 MG/ML injection    NARCAN    1 mL    Inject 0.25-1 mLs (0.1-0.4 mg) into the vein once for 1 dose    Chronic pain syndrome       Omega-3 Krill Oil 300 MG Caps      Take 300 mg by mouth daily        ondansetron 4 MG ODT tab    ZOFRAN ODT    20 tablet    Take 1 tablet (4 mg) by mouth every 6 hours as needed for nausea or vomiting    Nausea       oxyCODONE IR 5 MG tablet    ROXICODONE    18 tablet    Take 2 tablets (10 mg) by mouth every 4 hours as needed for pain        pregabalin 100 MG capsule    LYRICA    270  capsule    Take 1 capsule (100 mg) by mouth daily And 2 capsules (200 mg) at bedtime    Chronic pain syndrome       senna-docusate 8.6-50 MG per tablet    SENOKOT-S;PERICOLACE    120 tablet    Take 2 tablets by mouth 2 times daily    Other constipation       * traZODone 150 MG tablet    DESYREL    30 tablet    take 1 tablet by mouth daily  at bedtime    Primary insomnia       * traZODone 50 MG tablet    DESYREL    30 tablet    Take 1 tablet (50mg) by mouth nightly with 150 mg tablets as needed for sleep for a total of 200mg.    Primary insomnia       vitamin D 2000 units tablet     100 tablet    Take 2,000 Units by mouth daily        vitamin E 400 UNIT capsule     30 capsule    Take by mouth daily        * Notice:  This list has 2 medication(s) that are the same as other medications prescribed for you. Read the directions carefully, and ask your doctor or other care provider to review them with you.

## 2018-10-04 ENCOUNTER — TELEPHONE (OUTPATIENT)
Dept: PEDIATRICS | Facility: CLINIC | Age: 53
End: 2018-10-04

## 2018-10-04 NOTE — TELEPHONE ENCOUNTER
Patient called and requested OV notes from ENT and results of recent CT to be sent to FARZANEH Burr @ [phone]. Notes printed and faxed but CT report not in chart. Called patient and LM asking her where this was done.  Dalila Zabala LPN

## 2018-10-16 ENCOUNTER — TRANSFERRED RECORDS (OUTPATIENT)
Dept: HEALTH INFORMATION MANAGEMENT | Facility: CLINIC | Age: 53
End: 2018-10-16

## 2018-10-30 ENCOUNTER — TRANSFERRED RECORDS (OUTPATIENT)
Dept: HEALTH INFORMATION MANAGEMENT | Facility: CLINIC | Age: 53
End: 2018-10-30

## 2018-11-05 ENCOUNTER — HOSPITAL ENCOUNTER (OUTPATIENT)
Dept: LAB | Facility: CLINIC | Age: 53
Discharge: HOME | End: 2018-11-05
Attending: INTERNAL MEDICINE | Admitting: INTERNAL MEDICINE
Payer: MEDICARE

## 2018-11-05 DIAGNOSIS — E83.10 IRON METABOLISM DISORDER: ICD-10-CM

## 2018-11-05 DIAGNOSIS — G25.81 RESTLESS LEGS SYNDROME (RLS): ICD-10-CM

## 2018-11-05 LAB
BASOPHILS # BLD AUTO: 0 10E9/L (ref 0–0.2)
BASOPHILS NFR BLD AUTO: 0.6 %
DIFFERENTIAL METHOD BLD: NORMAL
EOSINOPHIL # BLD AUTO: 0.2 10E9/L (ref 0–0.7)
EOSINOPHIL NFR BLD AUTO: 2.6 %
ERYTHROCYTE [DISTWIDTH] IN BLOOD BY AUTOMATED COUNT: 12.6 % (ref 10–15)
FERRITIN SERPL-MCNC: 91 NG/ML (ref 8–252)
HCT VFR BLD AUTO: 42.2 % (ref 35–47)
HGB BLD-MCNC: 13.7 G/DL (ref 11.7–15.7)
IMM GRANULOCYTES # BLD: 0 10E9/L (ref 0–0.4)
IMM GRANULOCYTES NFR BLD: 0.2 %
IRON SATN MFR SERPL: 24 % (ref 15–46)
IRON SERPL-MCNC: 70 UG/DL (ref 35–180)
LYMPHOCYTES # BLD AUTO: 2.2 10E9/L (ref 0.8–5.3)
LYMPHOCYTES NFR BLD AUTO: 35.5 %
MCH RBC QN AUTO: 29.2 PG (ref 26.5–33)
MCHC RBC AUTO-ENTMCNC: 32.5 G/DL (ref 31.5–36.5)
MCV RBC AUTO: 90 FL (ref 78–100)
MONOCYTES # BLD AUTO: 0.6 10E9/L (ref 0–1.3)
MONOCYTES NFR BLD AUTO: 9.7 %
NEUTROPHILS # BLD AUTO: 3.2 10E9/L (ref 1.6–8.3)
NEUTROPHILS NFR BLD AUTO: 51.4 %
NRBC # BLD AUTO: 0 10*3/UL
NRBC BLD AUTO-RTO: 0 /100
PLATELET # BLD AUTO: 206 10E9/L (ref 150–450)
RBC # BLD AUTO: 4.69 10E12/L (ref 3.8–5.2)
TIBC SERPL-MCNC: 293 UG/DL (ref 240–430)
WBC # BLD AUTO: 6.2 10E9/L (ref 4–11)

## 2018-11-05 PROCEDURE — 85025 COMPLETE CBC W/AUTO DIFF WBC: CPT | Performed by: INTERNAL MEDICINE

## 2018-11-05 PROCEDURE — 36415 COLL VENOUS BLD VENIPUNCTURE: CPT | Performed by: INTERNAL MEDICINE

## 2018-11-05 PROCEDURE — 83550 IRON BINDING TEST: CPT | Performed by: INTERNAL MEDICINE

## 2018-11-05 PROCEDURE — 82728 ASSAY OF FERRITIN: CPT | Performed by: INTERNAL MEDICINE

## 2018-11-05 PROCEDURE — 83540 ASSAY OF IRON: CPT | Performed by: INTERNAL MEDICINE

## 2018-11-06 ENCOUNTER — THERAPY VISIT (OUTPATIENT)
Dept: SLEEP MEDICINE | Facility: CLINIC | Age: 53
End: 2018-11-06
Payer: MEDICARE

## 2018-11-06 ENCOUNTER — TELEPHONE (OUTPATIENT)
Dept: SLEEP MEDICINE | Facility: CLINIC | Age: 53
End: 2018-11-06

## 2018-11-06 DIAGNOSIS — G47.9 SLEEP DISTURBANCE: ICD-10-CM

## 2018-11-06 DIAGNOSIS — G47.19 EXCESSIVE DAYTIME SLEEPINESS: ICD-10-CM

## 2018-11-06 PROCEDURE — 95810 POLYSOM 6/> YRS 4/> PARAM: CPT | Performed by: INTERNAL MEDICINE

## 2018-11-06 NOTE — TELEPHONE ENCOUNTER
Patient called and has been in the bathroom all day. She is  extremely tired and just finished some antibiotics and wanting to know if this will affect her in lab study for tonight. Please give her a call at [phone].      Krista Greenberg

## 2018-11-06 NOTE — TELEPHONE ENCOUNTER
Spoke with patient, she stated that her diarrhea is resolved at this time and she is feeling fine and she will be going to sleep study tonight as scheduled.  Patient is on antibiotics with clindamycin for several rounds for oral surgery, and he was instructed if diarrhea recurs to see her primary care doctor to check C. difficile infection.  Patient states that her diarrhea is resolved and she is ready for the sleep study tonight.

## 2018-11-06 NOTE — MR AVS SNAPSHOT
After Visit Summary   11/6/2018    Cristela Smith    MRN: [id number]           Patient Information     Date Of Birth          1965        Visit Information        Provider Department      11/6/2018 8:30 PM BED 5 SH SLEEP [facility]        Today's Diagnoses     Excessive daytime sleepiness        Sleep disturbance          Care Instructions    [facility]    1. Your sleep study will be reviewed by a sleep physician within the next few days.     2. Please follow up in the sleep clinic as scheduled, or, make an appointment with your sleep provider to be seen within two weeks to discuss the results of the sleep study.    3. If you have any questions or problems with your treatment plan, please contact your sleep clinic provider at [phone] to further manage your condition.    4. Please review your attached medication list, and, at your follow-up appointment advise your sleep clinic provider about any changes.    5. Go to [email or web address] for more information about your sleep problems.    KAJAL Herrera  November 7, 2018                  Follow-ups after your visit        Your next 10 appointments already scheduled     Nov 14, 2018 11:00 AM CST   Return Sleep Patient with Luis Alberto Pro MD   [facility]    [address] [address] [phone]              Who to contact     If you have questions or need follow up information about today's clinic visit or your schedule please contact [facility] directly at [phone].  Normal or non-critical lab and imaging results will be communicated to you by MyChart, letter or phone within 4 business days after the clinic has received the results. If you do not hear from us within 7 days, please contact the clinic through MyChart or phone. If you have a critical or abnormal lab result, we  will notify you by phone as soon as possible.  Submit refill requests through [employer] or call your pharmacy and they will forward the refill request to us. Please allow 3 business days for your refill to be completed.          Additional Information About Your Visit        Care EveryWhere ID     This is your Care EveryWhere ID. This could be used by other organizations to access your [place] medical records  [id number]         Blood Pressure from Last 3 Encounters:   10/01/18 (!) 89/58   08/22/18 (!) 86/60   08/20/18 128/78    Weight from Last 3 Encounters:   10/01/18 73 kg (161 lb)   08/22/18 74.4 kg (164 lb 1.6 oz)   05/09/18 75.7 kg (166 lb 12.8 oz)              We Performed the Following     Comprehensive Sleep Study        Primary Care Provider Office Phone # Fax #    Halle De Los Santos [phone] [address] [place] [address]        Equal Access to Services     [facility]: Hadii aad ku hadasho Soomaali, waaxda luqadaha, qaybta kaalmada adeegyada, waxay idiin haydonnien collin membreno . So [facility] [phone].    ATENCIÓN: Si habla español, tiene a rowland disposición servicios gratuitos de asistencia lingüística. Llame al [phone].    We comply with applicable federal civil rights laws and [place] laws. We do not discriminate on the basis of race, color, national origin, age, disability, sex, sexual orientation, or gender identity.            Thank you!     Thank you for choosing [place] [facility]  for your care. Our goal is always to provide you with excellent care. Hearing back from our patients is one way we can continue to improve our services. Please take a few minutes to complete the written survey that you may receive in the mail after your visit with us. Thank you!             Your Updated Medication List - Protect others around you: Learn how to safely use, store and throw away your medicines at www.disposemymeds.org.          This list is  accurate as of 11/6/18 11:59 PM.  Always use your most recent med list.                   Brand Name Dispense Instructions for use Diagnosis    albuterol 108 (90 Base) MCG/ACT inhaler    PROAIR HFA/PROVENTIL HFA/VENTOLIN HFA    1 Inhaler    Inhale 2 puffs into the lungs every 6 hours as needed for shortness of breath / dyspnea or wheezing    Viral URI with cough       atorvastatin 20 MG tablet    LIPITOR    90 tablet    Take 1 tablet (20 mg) by mouth At Bedtime    Hyperlipidemia LDL goal <130       buPROPion 150 MG 24 hr tablet    WELLBUTRIN XL    30 tablet    Take 1 tablet (150 mg) by mouth every morning    Tobacco use       butalbital-acetaminophen-caffeine -40 MG per tablet    FIORICET/ESGIC     Take 1-2 tablets by mouth 4 times daily as needed        clonazePAM 0.5 MG tablet    klonoPIN     Take 0.5 mg by mouth 2 times daily as needed for anxiety        cyanocobalamin 2500 MCG sublingual tablet    VITAMIN B-12    90 tablet    Place 2,500 mcg under the tongue daily    Vitamin B12 deficiency (non anemic)       EPINEPHrine 0.3 MG/0.3ML injection 2-pack    EPIPEN/ADRENACLICK/or ANY BX GENERIC EQUIV    0.6 mL    Inject 0.3 mLs (0.3 mg) into the muscle as needed for anaphylaxis    Reaction to insect bite       estropipate 1.5 MG tablet    OGEN    90 tablet    Take 1 tablet by mouth once daily.    Menopausal syndrome (hot flashes)       fluticasone 50 MCG/ACT spray    FLONASE    1 Bottle    Spray 1-2 sprays into both nostrils daily as needed for rhinitis or allergies    Other chronic rhinitis       hydrOXYzine 50 MG tablet    ATARAX    180 tablet    Take 2 tablets (100 mg) by mouth every 6 hours as needed for anxiety    Anxiety       hyoscyamine 0.125 MG tablet    ANASPAZ/LEVSIN    40 tablet    Take 1-2 tablets (125-250 mcg) by mouth every 4 hours as needed for cramping    Irritable bowel syndrome without diarrhea       MOBIC 15 MG tablet   Generic drug:  meloxicam     30 tablet    Take 1 tablet (15 mg) by mouth  daily        MS CONTIN 15 MG 12 hr tablet   Generic drug:  morphine      Take 15 mg by mouth every 12 hours        naloxegol 25 MG Tabs tablet     30 tablet    Take 1 tablet (25 mg) by mouth every morning (before breakfast)    Drug-induced constipation, Chronic pain syndrome, Chronic constipation       naloxone 0.4 MG/ML injection    NARCAN    1 mL    Inject 0.25-1 mLs (0.1-0.4 mg) into the vein once for 1 dose    Chronic pain syndrome       Omega-3 Krill Oil 300 MG Caps      Take 300 mg by mouth daily        ondansetron 4 MG ODT tab    ZOFRAN ODT    20 tablet    Take 1 tablet (4 mg) by mouth every 6 hours as needed for nausea or vomiting    Nausea       oxyCODONE IR 5 MG tablet    ROXICODONE    18 tablet    Take 2 tablets (10 mg) by mouth every 4 hours as needed for pain        pregabalin 100 MG capsule    LYRICA    270 capsule    Take 1 capsule (100 mg) by mouth daily And 2 capsules (200 mg) at bedtime    Chronic pain syndrome       senna-docusate 8.6-50 MG per tablet    SENOKOT-S;PERICOLACE    120 tablet    Take 2 tablets by mouth 2 times daily    Other constipation       * traZODone 150 MG tablet    DESYREL    30 tablet    take 1 tablet by mouth daily  at bedtime    Primary insomnia       * traZODone 50 MG tablet    DESYREL    30 tablet    Take 1 tablet (50mg) by mouth nightly with 150 mg tablets as needed for sleep for a total of 200mg.    Primary insomnia       vitamin D 2000 units tablet     100 tablet    Take 2,000 Units by mouth daily        vitamin E 400 UNIT capsule     30 capsule    Take by mouth daily        * Notice:  This list has 2 medication(s) that are the same as other medications prescribed for you. Read the directions carefully, and ask your doctor or other care provider to review them with you.

## 2018-11-07 NOTE — PATIENT INSTRUCTIONS
[place] [facility]    1. Your sleep study will be reviewed by a sleep physician within the next few days.     2. Please follow up in the sleep clinic as scheduled, or, make an appointment with your sleep provider to be seen within two weeks to discuss the results of the sleep study.    3. If you have any questions or problems with your treatment plan, please contact your sleep clinic provider at [phone] to further manage your condition.    4. Please review your attached medication list, and, at your follow-up appointment advise your sleep clinic provider about any changes.    5. Go to [email or web address] for more information about your sleep problems.    Nereyda Solorzano, RPSGT  November 7, 2018

## 2018-11-07 NOTE — PROCEDURES
SLEEP STUDY INTERPRETATION  DIAGNOSTIC POLYSOMNOGRAPHY REPORT      Patient: AMIE SOTO  YOB: 1965  Study Date: 11/6/2018  MRN: [id number]  Referring Provider: Halle De Los Santos MD  Ordering Provider: Luis Alberto Pro MD    Indications for Polysomnography: The patient is a 53 y old Female who is 5' and weighs 161.0 lbs. Her BMI is 31.6, [place] sleepiness scale 23.0 and neck circumference is 34.0 cm. Relevant medical history includes hypertension, hyperlipidemia, depression, anxiety disorder, complex regional pain syndrome (CRPS), fibromyalgia, history  of cervical cancer, snoring and excessive daytime sleepiness. A diagnostic polysomnogram was performed to evaluate for sleep apnea, periodic leg movements, CSA, hypoventilation and hypoxemia.    Polysomnogram Data: A full night polysomnogram recorded the standard physiologic parameters including EEG, EOG, EMG, ECG, nasal and oral airflow. Respiratory parameters of chest and abdominal movements were recorded with respiratory inductance plethysmography. Oxygen saturation was recorded by pulse oximetry. Hypopnea scoring rule used: 1B 4%.    Sleep Architecture: All stages of sleep were achieved with reduced REM sleep and N3 sleep. There were no sleep fragmentation and normal sleep efficiency.  The total recording time of the polysomnogram was 497.3 minutes. The total sleep time was 487.0 minutes. Sleep latency was decreased at 1.7 minutes with the use of a sleep aid (Trazodone and oxycodone). REM latency was 357.5 minutes. Arousal index was normal at 8.1 arousals per hour. Sleep efficiency was normal at 97.9%. Wake after sleep onset was 8.5 minutes. The patient spent 1.5% of total sleep time in Stage N1, 26.1% in Stage N2, 68.8% in Stage N3, and 3.6% in REM. Time in REM supine was 3.5 minutes.    Respiration: There was no sleep disordered breathing but sustained sleep related hypoxemia. Oxygen therapy 1 lpm was started at epoch # 735.     Events ?  The polysomnogram revealed a presence of 0 obstructive, 1 central, and 0 mixed apneas resulting in an apnea index of 0.1 events per hour. There were 9 obstructive hypopneas and 12 central hypopneas resulting in an obstructive hypopnea index of 1.1 and central hypopnea index of 1.5 events per hour. The combined apnea/hypopnea index was 2.7 events per hour (central apnea/hypopnea index was 1.6 events per hour). The REM AHI was 0 events per hour. The supine AHI was 3.7 events per hour. The RERA index was 0.1 events per hour.  The RDI was 2.8 events per hour.    Snoring - was reported as mild to moderate.    Respiratory rate and pattern - was notable for normal respiratory rate and pattern.    Sustained Sleep Associated Hypoventilation - Transcutaneous carbon dioxide monitoring was used, however significant hypoventilation was not present with a maximum change from 43.7 to 48.1 mmHg and 0 minutes at or greater than 55 mmHg.    Sleep Associated Hypoxemia - (Greater than 5 minutes O2 sat at or below 88%) was present. Baseline oxygen saturation was 88.3%. Lowest oxygen saturation was 75.6%. Time spent less than or equal to 88% was 238.5 minutes. Time spent less than or equal to 89% was 297.4 minutes.    Movement Activity: There were occasional periodic leg movements without significant associated arousals. There was no abnormal nocturnal sleep behavior.    Periodic Limb Activity - There were 63 PLMs during the entire study. The PLM index was 7.8 movements per hour. The PLM Arousal Index was 2.3 per hour.    REM EMG Activity - Excessive transient/sustained muscle activity was not present.    Nocturnal Behavior - Abnormal sleep related behaviors were not noted during NREM / REM sleep.     Bruxism - None apparent.    Cardiac Summary: There was normal sinus rhythm throughout the night except occasional premature ventricular contractures.  The average pulse rate was 50.4 bpm. The minimum pulse rate was 30.0 bpm while the  maximum pulse rate was 76.0 bpm.  Arrhythmias were noted.      Assessment:     Sleep Hypoxemia G47.36    Primary Snoring R06.83    Recommendations:    The sleep study showed sustained sleep related hypoxemia without evidence of sleep disordered breathing; these may secondary medications like narcotics. We recommend evaluation of other causes of hypoxemia especially chronic obstructive lung disease in setting of history of current smoking. Will obtain pulmonary function test, carbon monoxide level and arterial blood gases.    The sleep study demonstrated no sleep disordered breathing but intermittent snoring. There is no further intervention required at this time.    Advice regarding the risks of drowsy driving.    Suggest optimizing sleep schedule and avoiding sleep deprivation.    Weight management (if BMI > 30).    Avoid sedating medications, including narcotics, and alcohol, as these may exacerbate if underlying respiratory disorders.     Follow up primary care doctor and/or referring physician as scheduled.       _____________________________________   electronically signed by: CHARLENE BERNAL MD (11/7/18    cc: Halle De Los Santos MD

## 2018-11-08 LAB — SLPCOMP: NORMAL

## 2018-11-09 DIAGNOSIS — N95.1 MENOPAUSAL SYNDROME (HOT FLASHES): ICD-10-CM

## 2018-11-09 DIAGNOSIS — R11.0 NAUSEA: ICD-10-CM

## 2018-11-09 RX ORDER — ESTRADIOL 1 MG/1
1 TABLET ORAL DAILY
Qty: 90 TABLET | Refills: 3 | Status: CANCELLED | OUTPATIENT
Start: 2018-11-09

## 2018-11-09 RX ORDER — ESTRADIOL 0.5 MG/1
0.5 TABLET ORAL DAILY
Qty: 90 TABLET | Refills: 3 | Status: CANCELLED | OUTPATIENT
Start: 2018-11-09

## 2018-11-09 NOTE — TELEPHONE ENCOUNTER
Routing to Dr. De Los Santos to advise on another alternative to Estropipate 1.5 mg qd. Patient is ok with trying any oral estrogen.     LOV: 8/22/18    Call back patient with update on the new medication, [phone].     Per [facility] & [facility] Estropipate is discontinued and no longer available to order and no longer made.     Patient is ok with another alternative.     Patient has tried Menest, Ogen & Estrace in the past. She's ok to go back to these or whatever Dr. De Los Santos prescribes.

## 2018-11-09 NOTE — TELEPHONE ENCOUNTER
Patient reports [facility] is unable to supply her Estropipate.     Will inquire with our Pharmacy to see if there is a back order issue on this and follow up with the patient.

## 2018-11-09 NOTE — TELEPHONE ENCOUNTER
Reason for Call:  Other prescription    Detailed comments: The pt was calling and she spoke to her pharmacy and the hormone medication that she is on is out of stock at her current pharmacy and she is wondering what she can do to get her meds.     Phone Number Patient can be reached at: Home number on file [phone] (home)    Best Time: Anytime    Can we leave a detailed message on this number? YES    Call taken on 11/9/2018 at 12:51 PM by Elma Alston

## 2018-11-12 NOTE — TELEPHONE ENCOUNTER
Rx sent for Menest as I believe she liked that one the best. Might depend upon which insurance covers. Please let know    Halle De Los Santos MD  Internal Medicine/Pediatrics

## 2018-11-12 NOTE — TELEPHONE ENCOUNTER
Alternative requested:   Menest not covered by insurance.    Preferred alternative is Estradiol Patch or tablet.

## 2018-11-13 ENCOUNTER — TRANSFERRED RECORDS (OUTPATIENT)
Dept: HEALTH INFORMATION MANAGEMENT | Facility: CLINIC | Age: 53
End: 2018-11-13

## 2018-11-13 RX ORDER — ESTRADIOL 1 MG/1
1 TABLET ORAL DAILY
Qty: 90 TABLET | Refills: 3 | Status: SHIPPED | OUTPATIENT
Start: 2018-11-13 | End: 2019-10-28

## 2018-11-13 RX ORDER — ONDANSETRON 4 MG/1
4 TABLET, ORALLY DISINTEGRATING ORAL EVERY 6 HOURS PRN
Qty: 20 TABLET | Refills: 1 | Status: SHIPPED | OUTPATIENT
Start: 2018-11-13

## 2018-11-13 NOTE — TELEPHONE ENCOUNTER
I called pharmacy and the only RX they have for patient is from Dr. Beyer, and I was told that authorization for an early refill has to come from the ordering provider.  They have an RX from 09/11 from Dr. Beyer that indicates three times daily dosing.    Patient notified of that RXs was sent to the pharmacy.  She will contact Dr. Beyer's office in the am.  No further questions.  Will call back if any other questions or concerns.  DAVID Cassidy RN

## 2018-11-13 NOTE — TELEPHONE ENCOUNTER
rx sent for estradiol pills. Please let patient know. Also please contact pharmacy and let know ok to fill Lyrica early due to travel as noted below.    Thanks,  Halle De Los Santos MD  Internal Medicine/Pediatrics

## 2018-11-13 NOTE — TELEPHONE ENCOUNTER
Patient calls regarding the covered alternatives as below.  She is interested in a pill, cannot do the patch as she is allergic to adhesive.    Please send of ok-routing high priority as patient is leaving out of state on Thursday.  Routing to PCP and a covering provider. Pharmacy loaded.    Update: patient has been weaning off all controlled substances:  Has been off dilaudid in the last 30 days, off morphine for 10 days now.  Takes 3 x 0.5 mg clonazepam per day ( does not need to take every day) for the next 30 days, 6 oxycodone per day for the next 30 days.    Needs Zofran 4 mg refilled. Prescription approved per [facility] Refill Protocol.    Camryn Diop RN  Message handled by Nurse Triage.      Patient called back and wanted to add that she will need a note sent to the pharmacy saying that it is okay to refill her Lyrica 100 mg early since she is traveling starting on Thursday this week and she will be gone for two weeks. She wants to  this refill at the pharmacy tomorrow.

## 2018-11-14 ENCOUNTER — OFFICE VISIT (OUTPATIENT)
Dept: SLEEP MEDICINE | Facility: CLINIC | Age: 53
End: 2018-11-14
Payer: MEDICARE

## 2018-11-14 VITALS
DIASTOLIC BLOOD PRESSURE: 68 MMHG | WEIGHT: 159 LBS | HEART RATE: 90 BPM | HEIGHT: 60 IN | OXYGEN SATURATION: 94 % | BODY MASS INDEX: 31.22 KG/M2 | SYSTOLIC BLOOD PRESSURE: 92 MMHG

## 2018-11-14 DIAGNOSIS — R09.02 HYPOXIA: ICD-10-CM

## 2018-11-14 DIAGNOSIS — R06.83 PRIMARY SNORING: ICD-10-CM

## 2018-11-14 DIAGNOSIS — G47.34 SLEEP RELATED HYPOXIA: Primary | ICD-10-CM

## 2018-11-14 PROCEDURE — 99213 OFFICE O/P EST LOW 20 MIN: CPT | Performed by: INTERNAL MEDICINE

## 2018-11-14 NOTE — PROGRESS NOTES
Sleep Study Follow-Up Visit:    Date on this visit: 2018    ASSESSMENT / PLAN:       Sleep related hypoxia  Hypoxia  Primary snoring  She has significant oxygen desaturation throughout the night, and patient took oxycodone prior to sleep, in addition patient is current smoker.  There is no sleep disordered breathing during the sleep study. She states that her O 2 sat was low during the day too. She cut down Ms contin and dilaudid, she takes Oxycodone 10 mg prn and at bedtime,  Will consider obtaining pulmonary function tests, ABGs on carbon monoxide level. She is cutting down cigarette smoking.  Patient had intermittent snoring, no intervention is needed. She is going [place] next week, will discuss result later.   She was advised to quit smoking and cut down oxycodone during the night.    All questions were answered.  The patient indicates understanding of the above issues and agrees with the plan set forth.    Orders Placed This Encounter   Procedures     Blood gas arterial (PH, WY, SH, RH, UU, HI, UR)     Carbon monoxide     General PFT Lab (Please always keep checked)     Pulmonary Function Test       She will follow up with me in about 6 month(s).       BRIEF SUMMARY:    Cristela Smith is a 53 year old female with history of hypertension, hyperlipidemia, depression, anxiety disorder, complex regional pain syndrome (CRPS), fibromyalgia, history  of cervical cancer, snoring and excessive daytime sleepiness comes in today for follow-up of her sleep study done on 18 at the [facility] for result of sleep study. Please see H&P for his presenting sleep symptoms for additional details.      PS18  Sleep latency 1.7 minutes with sleep aid.    REM achieved.   REM latency 357.5 minutes.    Sleep efficiency 97.9%. Total sleep time 487 minutes.  Sleep architecture:  Stage 1, 1.5% (5%), stage 2, 26.1% (45-55%), stage 3, 68.8% (15-20%), stage REM, 3.6% (20-25%).    AHI was 2.7/hour.   CSAI was  1.6/hour.   RDI 2.8/hour.    REM AHI 0/hour.    Supine AHI 3.7/hour.    owest O2 saturation:75.6%  S/He spent 238.5 minutes below 88% SpO2.   Periodic Limb Movement Index 7.8/hour.       These findings were reviewed with the patient and copy of the sleep study result was given.    Past medical/surgical history, family history, social history, medications and allergies were reviewed.      Problem List:  Patient Active Problem List    Diagnosis Date Noted     Mild reactive airways disease, unspecified whether persistent 11/17/2017     Priority: Medium     Simple chronic bronchitis (H) 12/09/2015     Priority: Medium     Prediabetes 11/27/2015     Priority: Medium     Chronic pain syndrome 08/25/2015     Priority: Medium     Patient is followed by Halle De Los Santos MD for ongoing prescription of pain medication.  All refills should be approved by this provider, or covering partner.    From fall at work 7/2013  Seeing Dr. Beyer at [facility] [place] for oxycontin and oxycodone    Controlled substance agreement on file: No    Pain Clinic evaluation in the past: Yes, 12/10/15, seeing Dr. Beyer at [facility] [place]     DIRE Total Score(s):  No flowsheet data found.    Last [facility] website verification:  done on 12/9/2015   [email or web address]         Complex regional pain syndrome of right upper extremity 08/25/2015     Priority: Medium     Adenomatous colon polyp 05/11/2015     Priority: Medium     On colonoscopy 5/2015 - repeat in 5 years       SLAP tear of shoulder 03/11/2015     Priority: Medium     Cervicalgia 06/06/2014     Priority: Medium                Post concussive syndrome 06/06/2014     Priority: Medium     Hyperlipidemia LDL goal <130 07/15/2013     Priority: Medium     Moderate major depression (H) 07/15/2013     Priority: Medium     Anxiety 07/15/2013     Priority: Medium     Hypertension goal BP (blood pressure) < 140/90 07/15/2013     Priority: Medium             Medications:     Current Outpatient  Prescriptions   Medication Sig     albuterol (PROAIR HFA/PROVENTIL HFA/VENTOLIN HFA) 108 (90 Base) MCG/ACT Inhaler Inhale 2 puffs into the lungs every 6 hours as needed for shortness of breath / dyspnea or wheezing     atorvastatin (LIPITOR) 20 MG tablet Take 1 tablet (20 mg) by mouth At Bedtime     buPROPion (WELLBUTRIN XL) 150 MG 24 hr tablet Take 1 tablet (150 mg) by mouth every morning     butalbital-acetaminophen-caffeine (FIORICET, ESGIC) -40 MG per tablet Take 1-2 tablets by mouth 4 times daily as needed     Cholecalciferol (VITAMIN D) 2000 UNITS tablet Take 2,000 Units by mouth daily     clonazePAM (KLONOPIN) 0.5 MG tablet Take 0.5 mg by mouth 2 times daily as needed for anxiety     cyabnocobalamin (VITAMIN B-12) 2500 MCG sublingual tablet Place 2,500 mcg under the tongue daily     EPINEPHrine (EPIPEN/ADRENACLICK/OR ANY BX GENERIC EQUIV) 0.3 MG/0.3ML injection 2-pack Inject 0.3 mLs (0.3 mg) into the muscle as needed for anaphylaxis     estradiol (ESTRACE) 1 MG tablet Take 1 tablet (1 mg) by mouth daily     fluticasone (FLONASE) 50 MCG/ACT spray Spray 1-2 sprays into both nostrils daily as needed for rhinitis or allergies     hydrOXYzine (ATARAX) 50 MG tablet Take 2 tablets (100 mg) by mouth every 6 hours as needed for anxiety     hyoscyamine (ANASPAZ,LEVSIN) 0.125 MG tablet Take 1-2 tablets (125-250 mcg) by mouth every 4 hours as needed for cramping     meloxicam (MOBIC) 15 MG tablet Take 1 tablet (15 mg) by mouth daily     naloxegol 25 MG TABS tablet Take 1 tablet (25 mg) by mouth every morning (before breakfast)     naloxone (NARCAN) 0.4 MG/ML injection Inject 0.25-1 mLs (0.1-0.4 mg) into the vein once for 1 dose     Omega-3 Krill Oil 300 MG CAPS Take 300 mg by mouth daily     ondansetron (ZOFRAN ODT) 4 MG ODT tab Take 1 tablet (4 mg) by mouth every 6 hours as needed for nausea or vomiting     oxyCODONE (ROXICODONE) 5 MG IR tablet Take 2 tablets (10 mg) by mouth every 4 hours as needed for pain      "pregabalin (LYRICA) 100 MG capsule Take 1 capsule (100 mg) by mouth daily And 2 capsules (200 mg) at bedtime     senna-docusate (SENOKOT-S;PERICOLACE) 8.6-50 MG per tablet Take 2 tablets by mouth 2 times daily     traZODone (DESYREL) 150 MG tablet take 1 tablet by mouth daily  at bedtime     traZODone (DESYREL) 50 MG tablet Take 1 tablet (50mg) by mouth nightly with 150 mg tablets as needed for sleep for a total of 200mg.     vitamin E 400 UNIT capsule Take by mouth daily     No current facility-administered medications for this visit.           PHYSICAL EXAMINATION:  BP 92/68  Pulse 90  Ht 1.525 m (5' 0.04\")  Wt 72.1 kg (159 lb)  SpO2 94%  BMI 31.01 kg/m2          Data: All pertinent previous laboratory data reviewed     Lab Results   Component Value Date    PH 7.40 10/18/2015    PO2 78 (L) 10/18/2015    PCO2 45 10/18/2015    HCO3 28 10/18/2015    ARAMIS 2.9 10/18/2015     Lab Results   Component Value Date    TSH 0.83 09/18/2018    TSH 1.60 11/30/2017     Lab Results   Component Value Date     (H) 09/18/2018    GLC 89 08/20/2018     Lab Results   Component Value Date    HGB 13.7 11/05/2018    HGB 13.2 09/18/2018     Lab Results   Component Value Date    BUN 7 09/18/2018    BUN 8 08/20/2018    CR 1.03 09/18/2018    CR 0.93 08/20/2018     Lab Results   Component Value Date    SALMA 91 11/05/2018        Fifteen minutes spent with patient, all of which were spent face-to-face counseling, consulting, coordinating plan of care, going over sleep test results and chart review.          Luis Alberto Pro MD 11/14/2018   [facility]  [address], [place] [address] [phone] Clinic      CC: No ref. provider found  Copy to: Halle De Los Santos    "

## 2018-11-14 NOTE — PATIENT INSTRUCTIONS
Your blood pressure was checked while you were in clinic today.  Please read the guidelines below about what these numbers mean and what you should do about them.  Your systolic blood pressure is the top number.  This is the pressure when the heart is pumping.  Your diastolic blood pressure is the bottom number.  This is the pressure in between beats.  If your systolic blood pressure is less than 120 and your diastolic blood pressure is less than 80, then your blood pressure is normal. There is nothing more that you need to do about it  If your systolic blood pressure is 120-139 or your diastolic blood pressure is 80-89, your blood pressure may be higher than it should be.  You should have your blood pressure re-checked within a year by a primary care provider.  If your systolic blood pressure is 140 or greater or your diastolic blood pressure is 90 or greater, you may have high blood pressure.  High blood pressure is treatable, but if left untreated over time it can put you at risk for heart attack, stroke, or kidney failure.  You should have your blood pressure re-checked by a primary care provider within the next four weeks.  Your BMI is There is no height or weight on file to calculate BMI.  Weight management is a personal decision.  If you are interested in exploring weight loss strategies, the following discussion covers the approaches that may be successful. Body mass index (BMI) is one way to tell whether you are at a healthy weight, overweight, or obese. It measures your weight in relation to your height.  A BMI of 18.5 to 24.9 is in the healthy range. A person with a BMI of 25 to 29.9 is considered overweight, and someone with a BMI of 30 or greater is considered obese. More than two-thirds of American adults are considered overweight or obese.  Being overweight or obese increases the risk for further weight gain. Excess weight may lead to heart disease and diabetes.  Creating and following plans for  healthy eating and physical activity may help you improve your health.  Weight control is part of healthy lifestyle and includes exercise, emotional health, and healthy eating habits. Careful eating habits lifelong are the mainstay of weight control. Though there are significant health benefits from weight loss, long-term weight loss with diet alone may be very difficult to achieve- studies show long-term success with dietary management in less than 10% of people. Attaining a healthy weight may be especially difficult to achieve in those with severe obesity. In some cases, medications, devices and surgical management might be considered.  What can you do?  If you are overweight or obese and are interested in methods for weight loss, you should discuss this with your provider.     Consider reducing daily calorie intake by 500 calories.     Keep a food journal.     Avoiding skipping meals, consider cutting portions instead.    Diet combined with exercise helps maintain muscle while optimizing fat loss. Strength training is particularly important for building and maintaining muscle mass. Exercise helps reduce stress, increase energy, and improves fitness. Increasing exercise without diet control, however, may not burn enough calories to loose weight.       Start walking three days a week 10-20 minutes at a time    Work towards walking thirty minutes five days a week     Eventually, increase the speed of your walking for 1-2 minutes at time    In addition, we recommend that you review healthy lifestyles and methods for weight loss available through the National [employer] of [employer] patient information sites:  http://win.niddk.nih.gov/publications/index.htm    And look into health and wellness programs that may be available through your health insurance provider, employer, local community center, or [employer].

## 2018-11-14 NOTE — NURSING NOTE
"Chief Complaint   Patient presents with     RECHECK     f/u psg split with tcm       Initial BP 92/68  Pulse 90  Ht 1.525 m (5' 0.04\")  Wt 72.1 kg (159 lb)  SpO2 94%  BMI 31.01 kg/m2 Estimated body mass index is 31.01 kg/(m^2) as calculated from the following:    Height as of this encounter: 1.525 m (5' 0.04\").    Weight as of this encounter: 72.1 kg (159 lb).    Medication Reconciliation: complete        Dyan Smith, Sleep clinic Specialist  "

## 2018-11-14 NOTE — MR AVS SNAPSHOT
After Visit Summary   11/14/2018    Cristela Smith    MRN: [id number]           Patient Information     Date Of Birth          1965        Visit Information        Provider Department      11/14/2018 11:00 AM Luis Alberto Pro MD [facility]        Today's Diagnoses     Sleep related hypoxia    -  1    Primary snoring        Hypoxia          Care Instructions    Your blood pressure was checked while you were in clinic today.  Please read the guidelines below about what these numbers mean and what you should do about them.  Your systolic blood pressure is the top number.  This is the pressure when the heart is pumping.  Your diastolic blood pressure is the bottom number.  This is the pressure in between beats.  If your systolic blood pressure is less than 120 and your diastolic blood pressure is less than 80, then your blood pressure is normal. There is nothing more that you need to do about it  If your systolic blood pressure is 120-139 or your diastolic blood pressure is 80-89, your blood pressure may be higher than it should be.  You should have your blood pressure re-checked within a year by a primary care provider.  If your systolic blood pressure is 140 or greater or your diastolic blood pressure is 90 or greater, you may have high blood pressure.  High blood pressure is treatable, but if left untreated over time it can put you at risk for heart attack, stroke, or kidney failure.  You should have your blood pressure re-checked by a primary care provider within the next four weeks.  Your BMI is There is no height or weight on file to calculate BMI.  Weight management is a personal decision.  If you are interested in exploring weight loss strategies, the following discussion covers the approaches that may be successful. Body mass index (BMI) is one way to tell whether you are at a healthy weight, overweight, or obese. It measures your weight in relation to your  height.  A BMI of 18.5 to 24.9 is in the healthy range. A person with a BMI of 25 to 29.9 is considered overweight, and someone with a BMI of 30 or greater is considered obese. More than two-thirds of American adults are considered overweight or obese.  Being overweight or obese increases the risk for further weight gain. Excess weight may lead to heart disease and diabetes.  Creating and following plans for healthy eating and physical activity may help you improve your health.  Weight control is part of healthy lifestyle and includes exercise, emotional health, and healthy eating habits. Careful eating habits lifelong are the mainstay of weight control. Though there are significant health benefits from weight loss, long-term weight loss with diet alone may be very difficult to achieve- studies show long-term success with dietary management in less than 10% of people. Attaining a healthy weight may be especially difficult to achieve in those with severe obesity. In some cases, medications, devices and surgical management might be considered.  What can you do?  If you are overweight or obese and are interested in methods for weight loss, you should discuss this with your provider.     Consider reducing daily calorie intake by 500 calories.     Keep a food journal.     Avoiding skipping meals, consider cutting portions instead.    Diet combined with exercise helps maintain muscle while optimizing fat loss. Strength training is particularly important for building and maintaining muscle mass. Exercise helps reduce stress, increase energy, and improves fitness. Increasing exercise without diet control, however, may not burn enough calories to loose weight.       Start walking three days a week 10-20 minutes at a time    Work towards walking thirty minutes five days a week     Eventually, increase the speed of your walking for 1-2 minutes at time    In addition, we recommend that you review healthy lifestyles and methods  "for weight loss available through the National Institutes of Health patient information sites:  http://win.niddk.nih.gov/publications/index.htm    And look into health and wellness programs that may be available through your health insurance provider, employer, local community center, or karen club.                Follow-ups after your visit        Future tests that were ordered for you today     Open Future Orders        Priority Expected Expires Ordered    General PFT Lab (Please always keep checked) Routine  11/14/2019 11/14/2018    Pulmonary Function Test Routine  11/14/2019 11/14/2018    Blood gas arterial (PH, WY, SH, RH, UU, HI, UR) Routine  11/14/2019 11/14/2018            Who to contact     If you have questions or need follow up information about today's clinic visit or your schedule please contact [facility] directly at [phone].  Normal or non-critical lab and imaging results will be communicated to you by MyChart, letter or phone within 4 business days after the clinic has received the results. If you do not hear from us within 7 days, please contact the clinic through MyChart or phone. If you have a critical or abnormal lab result, we will notify you by phone as soon as possible.  Submit refill requests through [employer] or call your pharmacy and they will forward the refill request to us. Please allow 3 business days for your refill to be completed.          Additional Information About Your Visit        Care EveryWhere ID     This is your Care EveryWhere ID. This could be used by other organizations to access your [place] medical records  [id number]        Your Vitals Were     Pulse Height Pulse Oximetry BMI (Body Mass Index)          90 1.525 m (5' 0.04\") 94% 31.01 kg/m2         Blood Pressure from Last 3 Encounters:   11/14/18 92/68   10/01/18 (!) 89/58   08/22/18 (!) 86/60    Weight from Last 3 Encounters:   11/14/18 72.1 kg (159 lb)   10/01/18 73 kg (161 lb) "   08/22/18 74.4 kg (164 lb 1.6 oz)              We Performed the Following     Carbon monoxide        Primary Care Provider Office Phone # Fax #    Halle De Los Santos [phone] [address] [place] [address]        Equal Access to Services     [facility]: Hadii aad ku hadasho Soomaali, waaxda luqadaha, qaybta kaalmada adeegyada, waxay idiin hayaan adeeg maciel laportillon . So [facility] [phone].    ATENCIÓN: Si habla español, tiene a rowland disposición servicios gratuitos de asistencia lingüística. [facility] [phone].    We comply with applicable federal civil rights laws and [place] laws. We do not discriminate on the basis of race, color, national origin, age, disability, sex, sexual orientation, or gender identity.            Thank you!     Thank you for choosing [facility]  for your care. Our goal is always to provide you with excellent care. Hearing back from our patients is one way we can continue to improve our services. Please take a few minutes to complete the written survey that you may receive in the mail after your visit with us. Thank you!             Your Updated Medication List - Protect others around you: Learn how to safely use, store and throw away your medicines at www.disposemymeds.org.          This list is accurate as of 11/14/18 11:24 AM.  Always use your most recent med list.                   Brand Name Dispense Instructions for use Diagnosis    albuterol 108 (90 Base) MCG/ACT inhaler    PROAIR HFA/PROVENTIL HFA/VENTOLIN HFA    1 Inhaler    Inhale 2 puffs into the lungs every 6 hours as needed for shortness of breath / dyspnea or wheezing    Viral URI with cough       atorvastatin 20 MG tablet    LIPITOR    90 tablet    Take 1 tablet (20 mg) by mouth At Bedtime    Hyperlipidemia LDL goal <130       buPROPion 150 MG 24 hr tablet    WELLBUTRIN XL    30 tablet    Take 1 tablet (150 mg) by mouth every morning    Tobacco use        butalbital-acetaminophen-caffeine -40 MG per tablet    FIORICET/ESGIC     Take 1-2 tablets by mouth 4 times daily as needed        clonazePAM 0.5 MG tablet    klonoPIN     Take 0.5 mg by mouth 3 times daily as needed for anxiety (increased to tid prn for next weeks)        cyanocobalamin 2500 MCG sublingual tablet    VITAMIN B-12    90 tablet    Place 2,500 mcg under the tongue daily    Vitamin B12 deficiency (non anemic)       EPINEPHrine 0.3 MG/0.3ML injection 2-pack    EPIPEN/ADRENACLICK/or ANY BX GENERIC EQUIV    0.6 mL    Inject 0.3 mLs (0.3 mg) into the muscle as needed for anaphylaxis    Reaction to insect bite       estradiol 1 MG tablet    ESTRACE    90 tablet    Take 1 tablet (1 mg) by mouth daily    Menopausal syndrome (hot flashes)       fluticasone 50 MCG/ACT spray    FLONASE    1 Bottle    Spray 1-2 sprays into both nostrils daily as needed for rhinitis or allergies    Other chronic rhinitis       hydrOXYzine 50 MG tablet    ATARAX    180 tablet    Take 2 tablets (100 mg) by mouth every 6 hours as needed for anxiety    Anxiety       hyoscyamine 0.125 MG tablet    ANASPAZ/LEVSIN    40 tablet    Take 1-2 tablets (125-250 mcg) by mouth every 4 hours as needed for cramping    Irritable bowel syndrome without diarrhea       MOBIC 15 MG tablet   Generic drug:  meloxicam     30 tablet    Take 1 tablet (15 mg) by mouth daily        naloxegol 25 MG Tabs tablet     30 tablet    Take 1 tablet (25 mg) by mouth every morning (before breakfast)    Drug-induced constipation, Chronic pain syndrome, Chronic constipation       naloxone 0.4 MG/ML injection    NARCAN    1 mL    Inject 0.25-1 mLs (0.1-0.4 mg) into the vein once for 1 dose    Chronic pain syndrome       Omega-3 Krill Oil 300 MG Caps      Take 300 mg by mouth daily        ondansetron 4 MG ODT tab    ZOFRAN ODT    20 tablet    Take 1 tablet (4 mg) by mouth every 6 hours as needed for nausea or vomiting    Nausea       oxyCODONE IR 5 MG tablet     ROXICODONE    18 tablet    Take 2 tablets (10 mg) by mouth every 4 hours as needed for pain        pregabalin 100 MG capsule    LYRICA    270 capsule    Take 1 capsule (100 mg) by mouth daily And 2 capsules (200 mg) at bedtime    Chronic pain syndrome       senna-docusate 8.6-50 MG per tablet    SENOKOT-S;PERICOLACE    120 tablet    Take 2 tablets by mouth 2 times daily    Other constipation       * traZODone 150 MG tablet    DESYREL    30 tablet    take 1 tablet by mouth daily  at bedtime    Primary insomnia       * traZODone 50 MG tablet    DESYREL    30 tablet    Take 1 tablet (50mg) by mouth nightly with 150 mg tablets as needed for sleep for a total of 200mg.    Primary insomnia       vitamin D 2000 units tablet     100 tablet    Take 2,000 Units by mouth daily        vitamin E 400 UNIT capsule     30 capsule    Take by mouth daily        * Notice:  This list has 2 medication(s) that are the same as other medications prescribed for you. Read the directions carefully, and ask your doctor or other care provider to review them with you.

## 2018-12-11 ENCOUNTER — TRANSFERRED RECORDS (OUTPATIENT)
Dept: HEALTH INFORMATION MANAGEMENT | Facility: CLINIC | Age: 53
End: 2018-12-11

## 2018-12-12 DIAGNOSIS — F51.01 PRIMARY INSOMNIA: ICD-10-CM

## 2018-12-12 RX ORDER — TRAZODONE HYDROCHLORIDE 50 MG/1
TABLET, FILM COATED ORAL
Qty: 90 TABLET | Refills: 1 | Status: SHIPPED | OUTPATIENT
Start: 2018-12-12 | End: 2019-06-09

## 2018-12-12 NOTE — TELEPHONE ENCOUNTER
Received fax from [facility] asking for 90 day supply of 50 mg trazodone that she takes with 150 mg tablet. rx sent    Halle De Los Santos MD  Internal Medicine/Pediatrics

## 2018-12-19 ENCOUNTER — TELEPHONE (OUTPATIENT)
Dept: PEDIATRICS | Facility: CLINIC | Age: 53
End: 2018-12-19

## 2018-12-19 ENCOUNTER — OFFICE VISIT (OUTPATIENT)
Dept: FAMILY MEDICINE | Facility: CLINIC | Age: 53
End: 2018-12-19
Payer: MEDICARE

## 2018-12-19 VITALS
HEART RATE: 77 BPM | DIASTOLIC BLOOD PRESSURE: 62 MMHG | BODY MASS INDEX: 31.79 KG/M2 | SYSTOLIC BLOOD PRESSURE: 90 MMHG | TEMPERATURE: 97.8 F | WEIGHT: 163 LBS | OXYGEN SATURATION: 95 %

## 2018-12-19 DIAGNOSIS — R82.90 ABNORMAL URINALYSIS: ICD-10-CM

## 2018-12-19 DIAGNOSIS — N39.0 ACUTE UTI: Primary | ICD-10-CM

## 2018-12-19 DIAGNOSIS — R30.0 DYSURIA: ICD-10-CM

## 2018-12-19 DIAGNOSIS — B37.31 YEAST INFECTION OF THE VAGINA: ICD-10-CM

## 2018-12-19 LAB
ALBUMIN UR-MCNC: NEGATIVE MG/DL
APPEARANCE UR: CLEAR
BACTERIA #/AREA URNS HPF: ABNORMAL /HPF
BILIRUB UR QL STRIP: NEGATIVE
COLOR UR AUTO: YELLOW
GLUCOSE UR STRIP-MCNC: NEGATIVE MG/DL
HGB UR QL STRIP: ABNORMAL
KETONES UR STRIP-MCNC: NEGATIVE MG/DL
LEUKOCYTE ESTERASE UR QL STRIP: ABNORMAL
NITRATE UR QL: NEGATIVE
PH UR STRIP: 5.5 PH (ref 5–7)
RBC #/AREA URNS AUTO: ABNORMAL /HPF
SOURCE: ABNORMAL
SP GR UR STRIP: 1.01 (ref 1–1.03)
SPECIMEN SOURCE: ABNORMAL
UROBILINOGEN UR STRIP-ACNC: 0.2 EU/DL (ref 0.2–1)
WBC #/AREA URNS AUTO: ABNORMAL /HPF
WET PREP SPEC: ABNORMAL

## 2018-12-19 PROCEDURE — 81001 URINALYSIS AUTO W/SCOPE: CPT | Performed by: NURSE PRACTITIONER

## 2018-12-19 PROCEDURE — 99213 OFFICE O/P EST LOW 20 MIN: CPT | Performed by: NURSE PRACTITIONER

## 2018-12-19 PROCEDURE — 87186 SC STD MICRODIL/AGAR DIL: CPT | Performed by: NURSE PRACTITIONER

## 2018-12-19 PROCEDURE — 87210 SMEAR WET MOUNT SALINE/INK: CPT | Performed by: NURSE PRACTITIONER

## 2018-12-19 PROCEDURE — 87086 URINE CULTURE/COLONY COUNT: CPT | Performed by: NURSE PRACTITIONER

## 2018-12-19 PROCEDURE — 87088 URINE BACTERIA CULTURE: CPT | Performed by: NURSE PRACTITIONER

## 2018-12-19 RX ORDER — MORPHINE SULFATE 15 MG/1
TABLET, FILM COATED, EXTENDED RELEASE ORAL
Refills: 0 | COMMUNITY
Start: 2018-10-16

## 2018-12-19 RX ORDER — FLUCONAZOLE 150 MG/1
150 TABLET ORAL ONCE
Qty: 2 TABLET | Refills: 0 | Status: SHIPPED | OUTPATIENT
Start: 2018-12-19 | End: 2019-02-07

## 2018-12-19 NOTE — TELEPHONE ENCOUNTER
Patient had first phase of oral surgery done and was on antibiotics for 12 weeks  Has had Diflucan three times and took a dose this am.  Next surgery is in January for left sided bone replacement.    Patient has had many yeast infections throughout this process.  Has been off the antibiotics for one week.  4-5 days ago noted urinary symptoms.  Urine is odorous and cloudy.  Dysuria.  Running a low grade temp.  Mid lower abdominal pressure.  Feels like bladder is full, but is voiding in small amounts.  Requesting RX for treatment of UTI.  Advised she will need an appointment for evaluation-requests an appointment in [place].  Appointment scheduled this afternoon-advised patient that this will be a short appointment to check for UTI.  DAVID Cassidy RN

## 2018-12-19 NOTE — PROGRESS NOTES
SUBJECTIVE:   Cristela Smith is a 53 year old female who presents to clinic today for the following health issues:      URINARY TRACT SYMPTOMS  Onset: X 3 DAYS     Description:   Painful urination (Dysuria): YES  Blood in urine (Hematuria): no   Delay in urine (Hesitency): YES    Intensity: 8-9/10    Progression of Symptoms:  worsening and constant    Accompanying Signs & Symptoms:  Fever/chills: YES--99.9F 2 days ago  Flank pain YES- left side, mild  Nausea and vomiting: YES- both  Any vaginal symptoms: vaginal itching, stronger odor this morning   Abdominal/Pelvic Pain: YES    History:   History of frequent UTI's: YES  History of kidney stones: no   Sexually Active: no   Possibility of pregnancy: No    Precipitating factors:   Patient states maybe from all the antibiotic     Therapies Tried and outcome:  and Increase fluid intake    Has been on antibiotics recently related to jaw surgery.  She finished a 14 day course of Levaquin about a week ago.  Took a tab of diflucan yesterday.  Continues to have symptoms.  Often takes more than one dose for yeast infections to resolve.  Has had a yeast infections recently due to use of antibiotics from surgery.      Problem list and histories reviewed & adjusted, as indicated.  Additional history: as documented    Current Outpatient Medications   Medication Sig Dispense Refill     albuterol (PROAIR HFA/PROVENTIL HFA/VENTOLIN HFA) 108 (90 Base) MCG/ACT Inhaler Inhale 2 puffs into the lungs every 6 hours as needed for shortness of breath / dyspnea or wheezing 1 Inhaler 3     atorvastatin (LIPITOR) 20 MG tablet Take 1 tablet (20 mg) by mouth At Bedtime 90 tablet 3     buPROPion (WELLBUTRIN XL) 150 MG 24 hr tablet Take 1 tablet (150 mg) by mouth every morning 30 tablet 3     butalbital-acetaminophen-caffeine (FIORICET, ESGIC) -40 MG per tablet Take 1-2 tablets by mouth 4 times daily as needed       Cholecalciferol (VITAMIN D) 2000 UNITS tablet Take 2,000 Units by mouth  daily 100 tablet 0     clonazePAM (KLONOPIN) 0.5 MG tablet Take 0.5 mg by mouth 3 times daily as needed for anxiety (increased to tid prn for next weeks)        cyabnocobalamin (VITAMIN B-12) 2500 MCG sublingual tablet Place 2,500 mcg under the tongue daily 90 tablet 2     EPINEPHrine (EPIPEN/ADRENACLICK/OR ANY BX GENERIC EQUIV) 0.3 MG/0.3ML injection 2-pack Inject 0.3 mLs (0.3 mg) into the muscle as needed for anaphylaxis 0.6 mL 1     estradiol (ESTRACE) 1 MG tablet Take 1 tablet (1 mg) by mouth daily 90 tablet 3     fluticasone (FLONASE) 50 MCG/ACT spray Spray 1-2 sprays into both nostrils daily as needed for rhinitis or allergies 1 Bottle 10     hydrOXYzine (ATARAX) 50 MG tablet Take 2 tablets (100 mg) by mouth every 6 hours as needed for anxiety 180 tablet 5     hyoscyamine (ANASPAZ,LEVSIN) 0.125 MG tablet Take 1-2 tablets (125-250 mcg) by mouth every 4 hours as needed for cramping 40 tablet 9     meloxicam (MOBIC) 15 MG tablet Take 1 tablet (15 mg) by mouth daily 30 tablet 1     morphine (MS CONTIN) 15 MG CR tablet TAKE 1 TABLET BY MOUTH 2 TIMES DAILY 10/16/18-11/14/18  0     naloxegol 25 MG TABS tablet Take 1 tablet (25 mg) by mouth every morning (before breakfast) 30 tablet 11     ondansetron (ZOFRAN ODT) 4 MG ODT tab Take 1 tablet (4 mg) by mouth every 6 hours as needed for nausea or vomiting 20 tablet 1     oxyCODONE (ROXICODONE) 5 MG IR tablet Take 2 tablets (10 mg) by mouth every 4 hours as needed for pain 18 tablet 0     pregabalin (LYRICA) 100 MG capsule Take 1 capsule (100 mg) by mouth daily And 2 capsules (200 mg) at bedtime 270 capsule 1     senna-docusate (SENOKOT-S;PERICOLACE) 8.6-50 MG per tablet Take 2 tablets by mouth 2 times daily 120 tablet 5     traZODone (DESYREL) 150 MG tablet take 1 tablet by mouth daily  at bedtime 30 tablet 3     traZODone (DESYREL) 50 MG tablet Take 1 tablet (50mg) by mouth nightly with 150 mg tablets as needed for sleep for a total of 200mg. 90 tablet 1     vitamin E  400 UNIT capsule Take by mouth daily 30 capsule 0     naloxone (NARCAN) 0.4 MG/ML injection Inject 0.25-1 mLs (0.1-0.4 mg) into the vein once for 1 dose 1 mL 3     Omega-3 Krill Oil 300 MG CAPS Take 300 mg by mouth daily       Allergies   Allergen Reactions     Keflex [Cephalexin] Swelling     Aspirin      Sulfa Drugs      Tongue and roof of mouth feels burning sensation. 20 years ago     Topamax [Topiramate] Itching and Swelling     Toprol Xl [Metoprolol]      Adhesive Tape Rash     Latex Rash       Reviewed and updated as needed this visit by clinical staff       Reviewed and updated as needed this visit by Provider         ROS:  Constitutional, HEENT, cardiovascular, pulmonary, gi and gu systems are negative, except as otherwise noted.    OBJECTIVE:     BP 90/62 (BP Location: Right arm, Patient Position: Sitting, Cuff Size: Adult Regular)   Pulse 77   Temp 97.8  F (36.6  C) (Oral)   Wt 73.9 kg (163 lb)   SpO2 95%   BMI 31.79 kg/m    Body mass index is 31.79 kg/m .  GENERAL: healthy, alert and no distress  RESP: lungs clear to auscultation - no rales, rhonchi or wheezes  CV: regular rate and rhythm, normal S1 S2, no S3 or S4, no murmur, click or rub  ABDOMEN: soft, mild lower abdominal tenderness, no hepatosplenomegaly, no masses and bowel sounds normal  BACK: no CVA tenderness    Diagnostic Test Results:  Results for orders placed or performed in visit on 12/19/18   *UA reflex to Microscopic and Culture ([place] and [facility]] (except [place] and [place])   Result Value Ref Range    Color Urine Yellow     Appearance Urine Clear     Glucose Urine Negative NEG^Negative mg/dL    Bilirubin Urine Negative NEG^Negative    Ketones Urine Negative NEG^Negative mg/dL    Specific Gravity Urine 1.010 1.003 - 1.035    Blood Urine Moderate (A) NEG^Negative    pH Urine 5.5 5.0 - 7.0 pH    Protein Albumin Urine Negative NEG^Negative mg/dL    Urobilinogen Urine 0.2 0.2 - 1.0 EU/dL    Nitrite Urine Negative  NEG^Negative    Leukocyte Esterase Urine Small (A) NEG^Negative    Source Midstream Urine    Urine Microscopic   Result Value Ref Range    WBC Urine 10-25 (A) OTO5^0 - 5 /HPF    RBC Urine O - 2 OTO2^O - 2 /HPF    Bacteria Urine Moderate (A) NEG^Negative /HPF       ASSESSMENT/PLAN:   1. Dysuria  On several antibiotics recently due to oral surgery.  Will wait for urine cx.    - *UA reflex to Microscopic and Culture ([place] and [facility] (except [place] and [place])  - Urine Microscopic  - Wet prep    2. Abnormal urinalysis  - Urine Culture Aerobic Bacterial    3. Yeast infection of the vagina  + yeast, can use OTC vagisil to help with external symptoms.  Took diflucan yesterday.  If symptoms persist can take additional tab in 2 days; okay to repeat if needed in 3 days.   - fluconazole (DIFLUCAN) 150 MG tablet; Take 1 tablet (150 mg) by mouth once for 1 dose May repeat in 3 days if symptoms persist.  Dispense: 2 tablet; Refill: 0        CARMENZA Machado [facility]

## 2018-12-19 NOTE — PATIENT INSTRUCTIONS
Take diflucan on Friday if symptoms persist.  Can use over the counter vagisil cream to help with symptoms.

## 2018-12-21 RX ORDER — NITROFURANTOIN 25; 75 MG/1; MG/1
100 CAPSULE ORAL 2 TIMES DAILY
Qty: 10 CAPSULE | Refills: 0 | Status: SHIPPED | OUTPATIENT
Start: 2018-12-21 | End: 2019-02-07

## 2018-12-22 LAB
BACTERIA SPEC CULT: ABNORMAL
SPECIMEN SOURCE: ABNORMAL

## 2018-12-26 ENCOUNTER — TELEPHONE (OUTPATIENT)
Dept: FAMILY MEDICINE | Facility: CLINIC | Age: 53
End: 2018-12-26

## 2018-12-26 DIAGNOSIS — R30.0 DYSURIA: Primary | ICD-10-CM

## 2018-12-26 DIAGNOSIS — N39.0 ACUTE UTI: ICD-10-CM

## 2018-12-26 RX ORDER — NITROFURANTOIN 25; 75 MG/1; MG/1
100 CAPSULE ORAL 2 TIMES DAILY
Qty: 10 CAPSULE | Refills: 0 | OUTPATIENT
Start: 2018-12-26

## 2018-12-26 NOTE — TELEPHONE ENCOUNTER
Bacteria was susceptible to macrobid, but resistant to just about everything else.  She needs to come in for a UA if symptoms persist.  She did also have a yeast infection which could be the cause of symptoms as well.  I ordered repeat UA and wet prep please have her come in and complete.    Amarilis Ruggiero CNP

## 2018-12-26 NOTE — TELEPHONE ENCOUNTER
Patient calling stating she is taking her last Macrobid today and she still has some UTI symptoms.  She does state that she is feeling a little better, but not resolved.  She continues to have some burning with urination and bladder fullness feeling.  She is leaving out of town on Friday and not returning until late the following week.  She is wondering because of her history if she could have another round of antibiotics to be sure the infection is resolved.  RX t'd up.  Please approve.  Call patient back at [phone] when done.  Nadia Maldonado RN

## 2018-12-27 ENCOUNTER — TELEPHONE (OUTPATIENT)
Dept: FAMILY MEDICINE | Facility: CLINIC | Age: 53
End: 2018-12-27

## 2018-12-27 DIAGNOSIS — R30.0 DYSURIA: ICD-10-CM

## 2018-12-27 DIAGNOSIS — B37.31 YEAST INFECTION OF THE VAGINA: Primary | ICD-10-CM

## 2018-12-27 LAB
ALBUMIN UR-MCNC: NEGATIVE MG/DL
APPEARANCE UR: CLEAR
BILIRUB UR QL STRIP: NEGATIVE
COLOR UR AUTO: YELLOW
GLUCOSE UR STRIP-MCNC: NEGATIVE MG/DL
HGB UR QL STRIP: NEGATIVE
KETONES UR STRIP-MCNC: ABNORMAL MG/DL
LEUKOCYTE ESTERASE UR QL STRIP: NEGATIVE
NITRATE UR QL: NEGATIVE
PH UR STRIP: 5.5 PH (ref 5–7)
SOURCE: ABNORMAL
SP GR UR STRIP: 1.02 (ref 1–1.03)
SPECIMEN SOURCE: ABNORMAL
UROBILINOGEN UR STRIP-ACNC: 0.2 EU/DL (ref 0.2–1)
WET PREP SPEC: ABNORMAL

## 2018-12-27 PROCEDURE — 81003 URINALYSIS AUTO W/O SCOPE: CPT | Performed by: NURSE PRACTITIONER

## 2018-12-27 PROCEDURE — 87210 SMEAR WET MOUNT SALINE/INK: CPT | Performed by: NURSE PRACTITIONER

## 2018-12-27 RX ORDER — FLUCONAZOLE 150 MG/1
150 TABLET ORAL
Qty: 4 TABLET | Refills: 0 | Status: SHIPPED | OUTPATIENT
Start: 2018-12-27 | End: 2019-02-07

## 2018-12-27 NOTE — TELEPHONE ENCOUNTER
Pt came in for lab only appt, if she needs medication she is asking to have it available for  before end of day today, she is leaving for the airport at 7 PM and will not return until after 1/1/19    Call pt at [phone] [facility] to [facility] with details      King Arana   12/27/18 11:48 AM

## 2018-12-27 NOTE — TELEPHONE ENCOUNTER
UA normal with exception of ketones, no UTI.  Increase fluids.  She is continues to have yeast.  Will send in Rx for diflucan.  Take every 3 days until symptoms resolve.  If sx's persist and needs to use all 4 tabs return for follow up. Rx sent to [facility].    Amarilis Ruggiero CNP

## 2019-01-04 DIAGNOSIS — Z72.0 TOBACCO USE: ICD-10-CM

## 2019-01-04 NOTE — TELEPHONE ENCOUNTER
"Requested Prescriptions   Pending Prescriptions Disp Refills     buPROPion (WELLBUTRIN XL) 150 MG 24 hr tablet  Last Written Prescription Date:  09/12/2018  Last Fill Quantity: 30 tablet,  # refills: 3   Last office visit: 8/22/2018 with prescribing provider:  Halle De Los Santos MD    Future Office Visit:     30 tablet 3     Sig: Take 1 tablet (150 mg) by mouth every morning    SSRIs Protocol Passed - 1/4/2019  1:37 PM       Passed - Recent (12 mo) or future (30 days) visit within the authorizing provider's specialty    Patient had office visit in the last 12 months or has a visit in the next 30 days with authorizing provider or within the authorizing provider's specialty.  See \"Patient Info\" tab in inbasket, or \"Choose Columns\" in Meds & Orders section of the refill encounter.             Passed - Medication is Bupropion    If the medication is Bupropion (Wellbutrin), and the patient is taking for smoking cessation; OK to refill.         Passed - Medication is active on med list       Passed - Patient is age 18 or older       Passed - No active pregnancy on record       Passed - No positive pregnancy test in last 12 months          "

## 2019-01-08 DIAGNOSIS — Z72.0 TOBACCO USE: ICD-10-CM

## 2019-01-08 RX ORDER — BUPROPION HYDROCHLORIDE 150 MG/1
150 TABLET ORAL EVERY MORNING
Qty: 30 TABLET | Refills: 0 | Status: SHIPPED | OUTPATIENT
Start: 2019-01-08 | End: 2019-02-07

## 2019-01-08 NOTE — TELEPHONE ENCOUNTER
"Requested Prescriptions   Pending Prescriptions Disp Refills     buPROPion (WELLBUTRIN XL) 150 MG 24 hr tablet [Pharmacy Med Name: BUPROPION HCL  MG TABLET]    Last Written Prescription Date:  9/12/2018  Last Fill Quantity: 30,  # refills: 3   Last office visit: 8/22/2018 with prescribing provider:  Halle De Los Santos     Future Office Visit:     30 tablet 3     Sig: TAKE 1 TABLET (150 MG) BY MOUTH EVERY MORNING    SSRIs Protocol Passed - 1/8/2019  1:40 AM       Passed - Recent (12 mo) or future (30 days) visit within the authorizing provider's specialty    Patient had office visit in the last 12 months or has a visit in the next 30 days with authorizing provider or within the authorizing provider's specialty.  See \"Patient Info\" tab in inbasket, or \"Choose Columns\" in Meds & Orders section of the refill encounter.             Passed - Medication is Bupropion    If the medication is Bupropion (Wellbutrin), and the patient is taking for smoking cessation; OK to refill.         Passed - Medication is active on med list       Passed - Patient is age 18 or older       Passed - No active pregnancy on record       Passed - No positive pregnancy test in last 12 months          "

## 2019-01-08 NOTE — TELEPHONE ENCOUNTER
Medication is being filled for 1 time refill only due to:  Patient needs to be seen because Due for follow up with PCP.     Rocío Ayala RN -- [facility]

## 2019-01-10 RX ORDER — BUPROPION HYDROCHLORIDE 150 MG/1
150 TABLET ORAL EVERY MORNING
Qty: 30 TABLET | Refills: 3 | Status: SHIPPED | OUTPATIENT
Start: 2019-01-10 | End: 2019-02-07

## 2019-01-10 NOTE — TELEPHONE ENCOUNTER
Last Written Prescription Date:  1/8/19  Last Fill Quantity: 30,   # refills: 0  Last Office Visit: over a year for DX associated with medication.   Future Office visit:  Not scheduled at this time.     Routing refill request to provider for review/approval because:  Drug not on the [facility], [facility] or [facility] refill protocol or controlled substance.     Had a refill done for this medication on 1/8/18 with 30 days, 0 refill.     Please advise.     Cleopatra Cyr RN Flex

## 2019-01-10 NOTE — TELEPHONE ENCOUNTER
rx sent. Due for follow-up in next 1-2 months. Please let know    Halle De Los Santos MD  Internal Medicine/Pediatrics

## 2019-01-15 ENCOUNTER — TRANSFERRED RECORDS (OUTPATIENT)
Dept: HEALTH INFORMATION MANAGEMENT | Facility: CLINIC | Age: 54
End: 2019-01-15

## 2019-01-17 ENCOUNTER — HOSPITAL ENCOUNTER (OUTPATIENT)
Dept: RESPIRATORY THERAPY | Facility: CLINIC | Age: 54
Discharge: HOME | End: 2019-01-17
Attending: INTERNAL MEDICINE | Admitting: INTERNAL MEDICINE
Payer: MEDICARE

## 2019-01-17 DIAGNOSIS — R09.02 HYPOXIA: ICD-10-CM

## 2019-01-17 DIAGNOSIS — G47.34 SLEEP RELATED HYPOXIA: ICD-10-CM

## 2019-01-17 LAB
BASE EXCESS BLDA CALC-SCNC: 3.8 MMOL/L
DLCOCOR-%PRED-PRE: 67 %
DLCOCOR-PRE: 14.48 ML/MIN/MMHG
DLCOUNC-%PRED-PRE: 68 %
DLCOUNC-PRE: 14.61 ML/MIN/MMHG
DLCOUNC-PRED: 21.32 ML/MIN/MMHG
ERV-%PRED-PRE: 46 %
ERV-PRE: 0.29 L
ERV-PRED: 0.62 L
EXPTIME-PRE: 6.61 SEC
FEF2575-%PRED-POST: 109 %
FEF2575-%PRED-PRE: 129 %
FEF2575-POST: 2.7 L/SEC
FEF2575-PRE: 3.2 L/SEC
FEF2575-PRED: 2.47 L/SEC
FEFMAX-%PRED-PRE: 118 %
FEFMAX-PRE: 7.38 L/SEC
FEFMAX-PRED: 6.22 L/SEC
FEV1-%PRED-PRE: 95 %
FEV1-PRE: 2.35 L
FEV1FEV6-PRE: 88 %
FEV1FEV6-PRED: 82 %
FEV1FVC-PRE: 87 %
FEV1FVC-PRED: 80 %
FEV1SVC-PRE: 85 %
FEV1SVC-PRED: 80 %
FIFMAX-PRE: 4.93 L/SEC
FRCPLETH-%PRED-PRE: 72 %
FRCPLETH-PRE: 1.84 L
FRCPLETH-PRED: 2.52 L
FVC-%PRED-PRE: 88 %
FVC-PRE: 2.7 L
FVC-PRED: 3.06 L
GAW-%PRED-PRE: 43 %
GAW-PRE: 0.45 L/S/CMH2O
GAW-PRED: 1.03 L/S/CMH2O
HCO3 BLD-SCNC: 30 MMOL/L (ref 21–28)
IC-%PRED-PRE: 101 %
IC-PRE: 2.48 L
IC-PRED: 2.44 L
O2/TOTAL GAS SETTING VFR VENT: ABNORMAL %
PCO2 BLD: 51 MM HG (ref 35–45)
PH BLD: 7.38 PH (ref 7.35–7.45)
PO2 BLD: 74 MM HG (ref 80–105)
RVPLETH-%PRED-PRE: 95 %
RVPLETH-PRE: 1.55 L
RVPLETH-PRED: 1.63 L
SGAW-%PRED-PRE: 220 %
SGAW-PRE: 0.22 1/CMH2O*S
SGAW-PRED: 0.1 1/CMH2O*S
SRAW-%PRED-PRE: 95 %
SRAW-PRE: 4.53 CMH2O*S
SRAW-PRED: 4.76 CMH2O*S
TLCPLETH-%PRED-PRE: 97 %
TLCPLETH-PRE: 4.31 L
TLCPLETH-PRED: 4.44 L
VA-%PRED-PRE: 80 %
VA-PRE: 3.67 L
VC-%PRED-PRE: 90 %
VC-PRE: 2.76 L
VC-PRED: 3.06 L

## 2019-01-17 PROCEDURE — 25000125 ZZHC RX 250

## 2019-01-17 PROCEDURE — 94726 PLETHYSMOGRAPHY LUNG VOLUMES: CPT

## 2019-01-17 PROCEDURE — 94060 EVALUATION OF WHEEZING: CPT

## 2019-01-17 PROCEDURE — 82803 BLOOD GASES ANY COMBINATION: CPT | Performed by: INTERNAL MEDICINE

## 2019-01-17 PROCEDURE — 36600 WITHDRAWAL OF ARTERIAL BLOOD: CPT

## 2019-01-17 PROCEDURE — 94729 DIFFUSING CAPACITY: CPT

## 2019-01-17 RX ORDER — ALBUTEROL SULFATE 0.83 MG/ML
SOLUTION RESPIRATORY (INHALATION)
Status: COMPLETED
Start: 2019-01-17 | End: 2019-01-17

## 2019-01-17 RX ADMIN — ALBUTEROL SULFATE 2.5 MG: 2.5 SOLUTION RESPIRATORY (INHALATION) at 11:17

## 2019-01-17 NOTE — PROGRESS NOTES
PFT Note: Patient completed pulmonary function testing with pre/post spirometry, lung volumes and diffusion. The results of this test met the ATS standards for acceptability and repeatability. Good patient effort and cooperation. Hgb value of 13.7 was used from 11/5/18. Albuterol neb 2.5 mg given for bronchodilation. AGB on room air was drawn 7.38/51/74/30 .

## 2019-01-21 ENCOUNTER — TELEPHONE (OUTPATIENT)
Dept: SLEEP MEDICINE | Facility: CLINIC | Age: 54
End: 2019-01-21

## 2019-01-21 NOTE — TELEPHONE ENCOUNTER
Left message to patient's phone to call back the sleep clinic for update the results of pulmonary function test and ABGs and the need for referral to pulmonologist.

## 2019-01-22 ENCOUNTER — TRANSFERRED RECORDS (OUTPATIENT)
Dept: HEALTH INFORMATION MANAGEMENT | Facility: CLINIC | Age: 54
End: 2019-01-22

## 2019-01-24 NOTE — TELEPHONE ENCOUNTER
Patient is updated for her pulmonary function test results and her blood gases.   She needs to follow-up with her primary care doctor.  She needs a smoking cessation program. She expresses understanding.

## 2019-01-24 NOTE — PROCEDURES
Procedure Date: 2019      Please see medical chart for graphs and statistics related to this report.       REFERRING PHYSICIAN:   Luis Alberto Pro   TECHNICIAN:   Mary Salcido      DIAGNOSIS:  Hypoxia, smoker      HEIGHT:   61.00 inches   WEIGHT:   160.00 Lbs.      DYSPNEA:   After any exertion   COUGH:   No cough   WHEEZE:   Rare      TOBACCO PROD:   Cigarette   YEARS SMOKED:   28.0   PKS/DAY:   1   YEARS QUIT:                                                                  MEDICATIONS:   Albuterol prn        POST-TEST COMMENTS:   The results of these tests meet ATS criteria for acceptability and repeatability.  Good patient effort and cooperation.  Patient given 2.5mg Albuterol neb for bronchodilation.  Hgb value of 13.7 from 2018 was used for Diffusion.  ABG on room air was drawn  7.38/51/74/30.       INTERPRETATION:      1.  Normal mechanics   2.  Normal volumes   3.  Mildly impaired gas transfer   4.  No obstruction         SANDY MUNGUIA MD             D: 2019   T: 2019   MT: BRENDON      Name:     AMIE SOTO   MRN:      [id number]        Account:        [id number]   :      1965           Procedure Date: 2019      Document: [id number]       cc: Luis Alberto De Los Santos MD

## 2019-02-07 ENCOUNTER — OFFICE VISIT (OUTPATIENT)
Dept: PEDIATRICS | Facility: CLINIC | Age: 54
End: 2019-02-07
Payer: MEDICARE

## 2019-02-07 VITALS
WEIGHT: 173.4 LBS | HEIGHT: 60 IN | SYSTOLIC BLOOD PRESSURE: 96 MMHG | BODY MASS INDEX: 34.04 KG/M2 | DIASTOLIC BLOOD PRESSURE: 70 MMHG | OXYGEN SATURATION: 98 % | TEMPERATURE: 98.7 F | HEART RATE: 83 BPM

## 2019-02-07 DIAGNOSIS — N89.8 VAGINAL DISCHARGE: ICD-10-CM

## 2019-02-07 DIAGNOSIS — R25.2 MUSCLE CRAMPS: ICD-10-CM

## 2019-02-07 DIAGNOSIS — B37.31 YEAST INFECTION OF THE VAGINA: ICD-10-CM

## 2019-02-07 DIAGNOSIS — Z13.1 SCREENING FOR DIABETES MELLITUS: ICD-10-CM

## 2019-02-07 DIAGNOSIS — K62.5 RECTAL BLEEDING: ICD-10-CM

## 2019-02-07 DIAGNOSIS — R09.02 HYPOXIA: ICD-10-CM

## 2019-02-07 DIAGNOSIS — E78.5 HYPERLIPIDEMIA LDL GOAL <130: ICD-10-CM

## 2019-02-07 DIAGNOSIS — Z12.31 VISIT FOR SCREENING MAMMOGRAM: ICD-10-CM

## 2019-02-07 DIAGNOSIS — K59.00 CONSTIPATION, UNSPECIFIED CONSTIPATION TYPE: ICD-10-CM

## 2019-02-07 DIAGNOSIS — J01.01 ACUTE RECURRENT MAXILLARY SINUSITIS: ICD-10-CM
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
## 2019-02-07 DIAGNOSIS — R30.0 DYSURIA: ICD-10-CM

## 2019-02-07 DIAGNOSIS — F32.1 MODERATE MAJOR DEPRESSION (H): Primary | ICD-10-CM

## 2019-02-07 DIAGNOSIS — F41.9 ANXIETY: ICD-10-CM

## 2019-02-07 DIAGNOSIS — E55.9 VITAMIN D DEFICIENCY: ICD-10-CM

## 2019-02-07 DIAGNOSIS — G56.41 COMPLEX REGIONAL PAIN SYNDROME TYPE 2 OF RIGHT UPPER EXTREMITY: ICD-10-CM

## 2019-02-07 LAB
ALBUMIN SERPL-MCNC: 3.8 G/DL (ref 3.4–5)
ALBUMIN UR-MCNC: NEGATIVE MG/DL
ALP SERPL-CCNC: 89 U/L (ref 40–150)
ALT SERPL W P-5'-P-CCNC: 46 U/L (ref 0–50)
ANION GAP SERPL CALCULATED.3IONS-SCNC: 4 MMOL/L (ref 3–14)
APPEARANCE UR: CLEAR
AST SERPL W P-5'-P-CCNC: 37 U/L (ref 0–45)
BILIRUB SERPL-MCNC: 0.2 MG/DL (ref 0.2–1.3)
BILIRUB UR QL STRIP: NEGATIVE
BUN SERPL-MCNC: 12 MG/DL (ref 7–30)
CALCIUM SERPL-MCNC: 8.9 MG/DL (ref 8.5–10.1)
CHLORIDE SERPL-SCNC: 107 MMOL/L (ref 94–109)
CO2 SERPL-SCNC: 30 MMOL/L (ref 20–32)
COLOR UR AUTO: YELLOW
CREAT SERPL-MCNC: 0.98 MG/DL (ref 0.52–1.04)
GFR SERPL CREATININE-BSD FRML MDRD: 67 ML/MIN/{1.73_M2}
GLUCOSE SERPL-MCNC: 93 MG/DL (ref 70–99)
GLUCOSE UR STRIP-MCNC: NEGATIVE MG/DL
HGB UR QL STRIP: NEGATIVE
KETONES UR STRIP-MCNC: NEGATIVE MG/DL
LEUKOCYTE ESTERASE UR QL STRIP: NEGATIVE
MAGNESIUM SERPL-MCNC: 2.3 MG/DL (ref 1.6–2.3)
NITRATE UR QL: NEGATIVE
PH UR STRIP: 5.5 PH (ref 5–7)
POTASSIUM SERPL-SCNC: 4.7 MMOL/L (ref 3.4–5.3)
PROT SERPL-MCNC: 7.1 G/DL (ref 6.8–8.8)
SODIUM SERPL-SCNC: 141 MMOL/L (ref 133–144)
SOURCE: NORMAL
SP GR UR STRIP: 1.02 (ref 1–1.03)
SPECIMEN SOURCE: NORMAL
UROBILINOGEN UR STRIP-ACNC: 0.2 EU/DL (ref 0.2–1)
WET PREP SPEC: NORMAL

## 2019-02-07 PROCEDURE — 82306 VITAMIN D 25 HYDROXY: CPT | Performed by: INTERNAL MEDICINE

## 2019-02-07 PROCEDURE — 83735 ASSAY OF MAGNESIUM: CPT | Performed by: INTERNAL MEDICINE

## 2019-02-07 PROCEDURE — 87210 SMEAR WET MOUNT SALINE/INK: CPT | Performed by: INTERNAL MEDICINE

## 2019-02-07 PROCEDURE — 36415 COLL VENOUS BLD VENIPUNCTURE: CPT | Performed by: INTERNAL MEDICINE

## 2019-02-07 PROCEDURE — 80053 COMPREHEN METABOLIC PANEL: CPT | Performed by: INTERNAL MEDICINE

## 2019-02-07 PROCEDURE — 81003 URINALYSIS AUTO W/O SCOPE: CPT | Performed by: INTERNAL MEDICINE

## 2019-02-07 PROCEDURE — 99215 OFFICE O/P EST HI 40 MIN: CPT | Performed by: INTERNAL MEDICINE

## 2019-02-07 RX ORDER — LEVOFLOXACIN 500 MG/1
500 TABLET, FILM COATED ORAL DAILY
Qty: 10 TABLET | Refills: 0 | Status: SHIPPED | OUTPATIENT
Start: 2019-02-07 | End: 2019-04-02

## 2019-02-07 RX ORDER — FLUCONAZOLE 150 MG/1
150 TABLET ORAL ONCE
Qty: 3 TABLET | Refills: 0 | Status: SHIPPED | OUTPATIENT
Start: 2019-02-07 | End: 2019-04-02

## 2019-02-07 ASSESSMENT — PATIENT HEALTH QUESTIONNAIRE - PHQ9
5. POOR APPETITE OR OVEREATING: MORE THAN HALF THE DAYS
SUM OF ALL RESPONSES TO PHQ QUESTIONS 1-9: 12

## 2019-02-07 ASSESSMENT — MIFFLIN-ST. JEOR: SCORE: 1323.66

## 2019-02-07 ASSESSMENT — ANXIETY QUESTIONNAIRES
GAD7 TOTAL SCORE: 16
7. FEELING AFRAID AS IF SOMETHING AWFUL MIGHT HAPPEN: MORE THAN HALF THE DAYS
5. BEING SO RESTLESS THAT IT IS HARD TO SIT STILL: MORE THAN HALF THE DAYS
IF YOU CHECKED OFF ANY PROBLEMS ON THIS QUESTIONNAIRE, HOW DIFFICULT HAVE THESE PROBLEMS MADE IT FOR YOU TO DO YOUR WORK, TAKE CARE OF THINGS AT HOME, OR GET ALONG WITH OTHER PEOPLE: VERY DIFFICULT
1. FEELING NERVOUS, ANXIOUS, OR ON EDGE: NEARLY EVERY DAY
3. WORRYING TOO MUCH ABOUT DIFFERENT THINGS: NEARLY EVERY DAY
2. NOT BEING ABLE TO STOP OR CONTROL WORRYING: SEVERAL DAYS
6. BECOMING EASILY ANNOYED OR IRRITABLE: NEARLY EVERY DAY

## 2019-02-07 NOTE — PATIENT INSTRUCTIONS
1. Labs today; electrolytes, magnesium levels, vitamin D  2. Levofloxacin 1 pill daily for 10 days for sinses  3. Refilled diflucan for yeast with antibiotics antibiotics  - test negative today  4. Start sertraline at 25 mg (1/2 tab) once daily for 4 days, then increase to 50 mg (1 tab) once daily  5. Start biotin or formulation for hair and nails  6. You will be contacted to set up the colonoscopy  7. Fasting cholesterol - schedule lab appointment in [place]  - fast 10-12 hours, water and black coffee are ok  8. Mammogram after 5/14  9. Shingrix shingles vaccine at pharmacy  - 2nd dose 2-6 months after the first  10. Follow-up in 1 month

## 2019-02-08 ASSESSMENT — ANXIETY QUESTIONNAIRES: GAD7 TOTAL SCORE: 16

## 2019-02-11 LAB — DEPRECATED CALCIDIOL+CALCIFEROL SERPL-MC: 36 UG/L (ref 20–75)

## 2019-02-12 ENCOUNTER — TRANSFERRED RECORDS (OUTPATIENT)
Dept: HEALTH INFORMATION MANAGEMENT | Facility: CLINIC | Age: 54
End: 2019-02-12

## 2019-02-26 NOTE — TELEPHONE ENCOUNTER
Patient called her pharmacy a few days ago and they were supposed to send us a fax requesting a refill of her medication. Patient is out of this medication and is requesting that we send it ASAP.    hydrOXYzine      Last Written Prescription Date: 11/14/16  Last Fill Quantity: 180,  # refills: 5   Last Office Visit with [facility], [facility] or [facility] prescribing provider: 9/13/17                                             .   There were no immediate complications during the procedure.

## 2019-02-27 ENCOUNTER — TELEPHONE (OUTPATIENT)
Dept: PEDIATRICS | Facility: CLINIC | Age: 54
End: 2019-02-27

## 2019-02-27 NOTE — TELEPHONE ENCOUNTER
Patient notified with result information noted from provider and agrees with plan.  Halle HERNANDEZ RN - Triage  [facility]

## 2019-02-27 NOTE — TELEPHONE ENCOUNTER
Reason for call:  Other   Patient called regarding (reason for call): call back  Additional comments: Please call patient back with lab results. She is locked out of MyChart and cannot accept them also. She said she just wanted to talk to a nurse.     Phone number to reach patient:  Home number on file [phone] (home)    Best Time:  asap    Can we leave a detailed message on this number? unknown

## 2019-03-01 DIAGNOSIS — F41.9 ANXIETY: ICD-10-CM

## 2019-03-01 DIAGNOSIS — F32.1 MODERATE MAJOR DEPRESSION (H): ICD-10-CM

## 2019-03-01 NOTE — TELEPHONE ENCOUNTER
"Requested Prescriptions   Pending Prescriptions Disp Refills     sertraline (ZOLOFT) 50 MG tablet  Last Written Prescription Date:  02/07/2019  Last Fill Quantity: 30 tablet,  # refills: 1   Last office visit: 2/7/2019 with prescribing provider:  Halle De Los Santos MD   Future Office Visit:   Next 5 appointments (look out 90 days)    Mar 06, 2019  2:20 PM CST  [facility] [address] [place] [address]  [address]  [facility] [address] [phone]          30 tablet 1     Sig: Take 0.5 tablets (25 mg) by mouth daily For 4 days, then 50 mg by mouth daily    SSRIs Protocol Failed - 3/1/2019  9:49 AM       Failed - PHQ-9 score less than 5 in past 6 months    Please review last PHQ-9 score.   PHQ-9 SCORE 8/4/2017 5/9/2018 2/7/2019   PHQ-9 Total Score - - -   PHQ-9 Total Score 13 11 12     HEBER-7 SCORE 1/18/2017 5/9/2018 2/7/2019   Total Score - - -   Total Score 13 13 16                Passed - Medication is active on med list       Passed - Patient is age 18 or older       Passed - No active pregnancy on record       Passed - No positive pregnancy test in last 12 months       Passed - Recent (6 mo) or future (30 days) visit within the authorizing provider's specialty    Patient had office visit in the last 6 months or has a visit in the next 30 days with authorizing provider or within the authorizing provider's specialty.  See \"Patient Info\" tab in inbasket, or \"Choose Columns\" in Meds & Orders section of the refill encounter.              "

## 2019-03-05 NOTE — TELEPHONE ENCOUNTER
Routing refill request to provider for review/approval because:  phq9 > 4 - pt has an appt scheduled for tomorrow - pt should still have meds - Refills sent 2/7/19, dispense 30 with 1 refill, will deny to the pharmacy with that note

## 2019-03-26 ENCOUNTER — TRANSFERRED RECORDS (OUTPATIENT)
Dept: HEALTH INFORMATION MANAGEMENT | Facility: CLINIC | Age: 54
End: 2019-03-26

## 2019-04-01 ENCOUNTER — TELEPHONE (OUTPATIENT)
Dept: PEDIATRICS | Facility: CLINIC | Age: 54
End: 2019-04-01

## 2019-04-01 NOTE — TELEPHONE ENCOUNTER
Pt notifies the following:     - URI sx's(chest congestion, moderate SOB, coughing up thick green phlegm, tiredness & ST from coughing) since Wed(3/27)  - denies fever, chills, body aches, wheezing, coughing up blood, GI sx's, difficulty breathing/swallowing, dizziness, HA or lethargy  - has been using her albuterol inhaler 4-5 times a day, also using flonase nasal spray  - haven't been taking any other otc meds  - wants to see pcp towards the end of the day or tomorrow(no appointment available)    Advised pt that she need to be seen today, offered UC. Pt says that she has no ride till the evening & requesting to schedule an appointment with a provider tomorrow. Able to schedule an appointment with  tomorrow at 1 pm. Pt agrees to go to UC/ER today/tonight with worsening sx's.     Vin RN  Triage Nurse

## 2019-04-02 ENCOUNTER — ANCILLARY PROCEDURE (OUTPATIENT)
Dept: GENERAL RADIOLOGY | Facility: CLINIC | Age: 54
End: 2019-04-02
Attending: INTERNAL MEDICINE
Payer: MEDICARE

## 2019-04-02 ENCOUNTER — OFFICE VISIT (OUTPATIENT)
Dept: PEDIATRICS | Facility: CLINIC | Age: 54
End: 2019-04-02
Payer: MEDICARE

## 2019-04-02 VITALS
WEIGHT: 170.3 LBS | BODY MASS INDEX: 33.44 KG/M2 | TEMPERATURE: 98.3 F | SYSTOLIC BLOOD PRESSURE: 96 MMHG | HEIGHT: 60 IN | HEART RATE: 72 BPM | OXYGEN SATURATION: 95 % | DIASTOLIC BLOOD PRESSURE: 62 MMHG

## 2019-04-02 DIAGNOSIS — J20.9 ACUTE BRONCHITIS WITH COEXISTING CONDITION REQUIRING PROPHYLACTIC TREATMENT: ICD-10-CM

## 2019-04-02 DIAGNOSIS — R05.9 COUGH: ICD-10-CM

## 2019-04-02 DIAGNOSIS — J45.21 MILD INTERMITTENT REACTIVE AIRWAY DISEASE WITH ACUTE EXACERBATION: ICD-10-CM

## 2019-04-02 DIAGNOSIS — J06.9 VIRAL URI WITH COUGH: ICD-10-CM

## 2019-04-02 DIAGNOSIS — R05.9 COUGH: Primary | ICD-10-CM

## 2019-04-02 PROCEDURE — 99214 OFFICE O/P EST MOD 30 MIN: CPT | Mod: 25 | Performed by: INTERNAL MEDICINE

## 2019-04-02 PROCEDURE — 94640 AIRWAY INHALATION TREATMENT: CPT | Performed by: INTERNAL MEDICINE

## 2019-04-02 PROCEDURE — 71046 X-RAY EXAM CHEST 2 VIEWS: CPT

## 2019-04-02 RX ORDER — AZITHROMYCIN 500 MG/1
500 TABLET, FILM COATED ORAL DAILY
Qty: 3 TABLET | Refills: 0 | Status: ON HOLD | OUTPATIENT
Start: 2019-04-02 | End: 2019-05-13

## 2019-04-02 RX ORDER — BENZONATATE 200 MG/1
200 CAPSULE ORAL 3 TIMES DAILY PRN
Qty: 30 CAPSULE | Refills: 0 | Status: SHIPPED | OUTPATIENT
Start: 2019-04-02 | End: 2019-05-15

## 2019-04-02 RX ORDER — IPRATROPIUM BROMIDE AND ALBUTEROL SULFATE 2.5; .5 MG/3ML; MG/3ML
3 SOLUTION RESPIRATORY (INHALATION) ONCE
Status: COMPLETED | OUTPATIENT
Start: 2019-04-02 | End: 2019-04-02

## 2019-04-02 RX ORDER — ALBUTEROL SULFATE 90 UG/1
2 AEROSOL, METERED RESPIRATORY (INHALATION) EVERY 6 HOURS PRN
Qty: 18 G | Refills: 1 | Status: SHIPPED | OUTPATIENT
Start: 2019-04-02

## 2019-04-02 RX ADMIN — IPRATROPIUM BROMIDE AND ALBUTEROL SULFATE 3 ML: 2.5; .5 SOLUTION RESPIRATORY (INHALATION) at 13:55

## 2019-04-02 ASSESSMENT — MIFFLIN-ST. JEOR: SCORE: 1309.6

## 2019-04-02 NOTE — PROGRESS NOTES
"  SUBJECTIVE:   Cristela Smith is a 53 year old female who presents to clinic today for the following health issues:      RESPIRATORY SYMPTOMS      Duration: x 1.5wks - getting worse.  Thought it was allergies but not responding to treatment.      Description  nasal congestion, rhinorrhea, cough - harsh and productive with dark yellow-brown sputum, hoarse voice and nausea.  Can't lay down as lungs feel like they are filling up.  Is wheezing.  Sleep is not good.      Severity: severe    Accompanying signs and symptoms: None    History (predisposing factors):  none    Precipitating or alleviating factors: None    Therapies tried and outcome:  Mucinex, zyrtec Flonase and albuterol inhaler and Dayquil with no relief    Is a smoker - trying to quit.  Nicotine patch makes her itch.  Has a hard time tolerating lozenge or gum due to oral issues.      Problem list and histories reviewed & adjusted, as indicated.  Additional history: as documented        Reviewed and updated as needed this visit by clinical staff  Tobacco  Allergies  Meds  Problems  Med Hx  Surg Hx  Fam Hx  Soc Hx        Reviewed and updated as needed this visit by Provider  Allergies  Meds  Problems         ROS:  Constitutional, HEENT, cardiovascular, pulmonary, gi and gu systems are negative, except as otherwise noted.    OBJECTIVE:     BP 96/62 (BP Location: Right arm, Patient Position: Chair, Cuff Size: Adult Regular)   Pulse 72   Temp 98.3  F (36.8  C) (Tympanic)   Ht 1.525 m (5' 0.04\")   Wt 77.2 kg (170 lb 4.8 oz)   SpO2 95%   BMI 33.22 kg/m    Body mass index is 33.22 kg/m .  GENERAL: alert and mild distress  EYES: Eyes grossly normal to inspection, PERRL and conjunctivae and sclerae normal  HENT: ear canals and TM's normal, nose and mouth without ulcers or lesions  NECK: no adenopathy, no asymmetry, masses, or scars and thyroid normal to palpation  RESP: lungs clear to auscultation - no rales, rhonchi or wheezes but frequent dry " cough.  Not improved post nebulizer  CV: regular rate and rhythm, normal S1 S2, no S3 or S4, no murmur, click or rub, no peripheral edema and peripheral pulses strong  MS: no gross musculoskeletal defects noted, no edema    Diagnostic Test Results:  CXR Findings: Normal- no infiltrates, effusions, pneumothoraces, cardiomegaly or masses.  Reviewed by me with patient     ASSESSMENT/PLAN:       1. Cough  Likely bronchitis given exam, normal cxr.    - XR Chest 2 Views; Future  - INHALATION/NEBULIZER TREATMENT, INITIAL    2. Mild intermittent reactive airway disease with acute exacerbation    - XR Chest 2 Views; Future  - ipratropium - albuterol 0.5 mg/2.5 mg/3 mL (DUONEB) neb solution 3 mL  - beclomethasone HFA (QVAR REDIHALER) 80 MCG/ACT inhaler; Inhale 1 puff into the lungs 2 times daily  Dispense: 10.6 g; Refill: 1    3. Acute bronchitis with coexisting condition requiring prophylactic treatment  Treat with antibiotic given symptoms and not responsive to nebulizer.  Pt declines oral prednisone  - azithromycin (ZITHROMAX) 500 MG tablet; Take 1 tablet (500 mg) by mouth daily  Dispense: 3 tablet; Refill: 0  - beclomethasone HFA (QVAR REDIHALER) 80 MCG/ACT inhaler; Inhale 1 puff into the lungs 2 times daily  Dispense: 10.6 g; Refill: 1  - benzonatate (TESSALON) 200 MG capsule; Take 1 capsule (200 mg) by mouth 3 times daily as needed for cough  Dispense: 30 capsule; Refill: 0    4. Viral URI with cough    - albuterol (PROAIR HFA/PROVENTIL HFA/VENTOLIN HFA) 108 (90 Base) MCG/ACT inhaler; Inhale 2 puffs into the lungs every 6 hours as needed for shortness of breath / dyspnea or wheezing  Dispense: 18 g; Refill: 1    See Patient Instructions    Janelle Yuen MD  [facility]

## 2019-04-02 NOTE — NURSING NOTE
The following nebulizer treatment was given:     MEDICATION: Duoneb  : [employer]  LOT #: [id number]  EXPIRATION DATE:  09/2020  NDC # 4908-2631-40     Nebulizer Start Time:  155pm  Nebulizer Stop Time:  203pm  See Vital Signs Flowsheet      Laurie Stack MA 1:56 PM 4/2/2019

## 2019-04-02 NOTE — PATIENT INSTRUCTIONS
Patient Education     Bronchitis, Antibiotic Treatment (Adult)    Bronchitis is an infection of the air passages (bronchial tubes) in your lungs. It often occurs when you have a cold. This illness is contagious during the first few days and is spread through the air by coughing and sneezing, or by direct contact (touching the sick person and then touching your own eyes, nose, or mouth).  Symptoms of bronchitis include cough with mucus (phlegm) and low-grade fever. Bronchitis usually lasts 7 to 14 days. Mild cases can be treated with simple home remedies. More severe infection is treated with an antibiotic.  Home care  Follow these guidelines when caring for yourself at home:    If your symptoms are severe, rest at home for the first 2 to 3 days. When you go back to your usual activities, don't let yourself get too tired.    Don't smoke. Also stay away from secondhand smoke.    You may use over-the-counter medicines to control fever or pain, unless another medicine was prescribed. If you have chronic liver or kidney disease or have ever had a stomach ulcer or gastrointestinal bleeding, talk with your healthcare provider before using these medicines.     Your appetite may be low, so a light diet is fine. Stay well hydrated by drinking 6 to 8 glasses of fluids per day. This includes water, soft drinks, sports drinks, juices, tea, or soup. Extra fluids will help loosen mucus in your nose and lungs.    Over-the-counter cough, cold, and sore-throat medicines will not shorten the length of the illness, but they may be helpful to reduce your symptoms. Don't use decongestants if you have high blood pressure.    Finish all antibiotic medicine. Do this even if you are feeling better after only a few days.    Take the benzonatate cough suppressant capsule three times daily as needed.    Use the qvar inhaler twice daily to decrease inflammation in your lungs.  Follow-up with Dr. De Los Santos in a couple of weeks to recheck and see  how long you should stay on this.  Follow-up care  Follow up with your healthcare provider, or as advised. If you had an X-ray or ECG (electrocardiogram), a specialist will review it. You will be told of any new test results that may affect your care.  If you are age 65 or older, if you smoke, or if you have a chronic lung disease or condition that affects your immune system, ask your healthcare provider about getting a pneumococcal vaccine and a yearly flu shot (influenza vaccine).  When to seek medical advice  Call your healthcare provider right away if any of these occur:    Fever of 100.4 F (38 C) or higher, or as directed by your healthcare provider    Coughing up more sputum    Weakness, drowsiness, headache, facial pain, ear pain, or a stiff neck     Call 911  Call 911 if any of these occur.    Coughing up blood    Weakness, drowsiness, headache, or stiff neck that get worse    Trouble breathing, wheezing, or pain with breathing   Date Last Reviewed: 6/1/2018 2000-2018 [employer]. [address] [place] [address], [place] [address]. All rights reserved. This information is not intended as a substitute for professional medical care. Always follow your healthcare professional's instructions.

## 2019-04-03 RX ORDER — FLUTICASONE PROPIONATE 110 UG/1
1 AEROSOL, METERED RESPIRATORY (INHALATION) 2 TIMES DAILY
Qty: 12 G | Refills: 1 | Status: SHIPPED | OUTPATIENT
Start: 2019-04-03

## 2019-04-03 NOTE — TELEPHONE ENCOUNTER
"Requested Prescriptions   Pending Prescriptions Disp Refills     beclomethasone HFA (QVAR REDIHALER) 80 MCG/ACT inhaler  This medication may not be due for a refill.  Last Written Prescription Date:  4/2/2019  Last Fill Quantity: 10.6g ,  # refills: 1   Last office visit: 4/2/19 with prescribing provider:  Janelle Yuen MD    Future Office Visit:   10.6 g 1     Sig: Inhale 1 puff into the lungs 2 times daily    Inhaled Steroids Protocol Failed - 4/2/2019  8:33 PM       Failed - Asthma control assessment score within normal limits in last 6 months    Please review ACT score.   No flowsheet data found.           Passed - Patient is age 12 or older       Passed - Medication is active on med list       Passed - Recent (6 mo) or future (30 days) visit within the authorizing provider's specialty    Patient had office visit in the last 6 months or has a visit in the next 30 days with authorizing provider or within the authorizing provider's specialty.  See \"Patient Info\" tab in inbasket, or \"Choose Columns\" in Meds & Orders section of the refill encounter.              "

## 2019-04-03 NOTE — TELEPHONE ENCOUNTER
Call received from [facility] pharmacy. Qvar isn't covered by pt's insurance. So, requesting to switch to Flovent, if appropriate.     Pended med, please review & sign, if appropriate. Thanks.    Vin, RN  Triage Nurse

## 2019-04-10 DIAGNOSIS — F32.1 MODERATE MAJOR DEPRESSION (H): ICD-10-CM

## 2019-04-10 DIAGNOSIS — F41.9 ANXIETY: ICD-10-CM

## 2019-04-10 NOTE — TELEPHONE ENCOUNTER
"Requested Prescriptions   Pending Prescriptions Disp Refills     sertraline (ZOLOFT) 50 MG tablet [Pharmacy Med Name: SERTRALINE HCL 50 MG TABLET]  Last Written Prescription Date:  02/07/2019  Last Fill Quantity: 30 tablet,  # refills: 1    Last office visit: 4/2/2019 with prescribing provider:  Janelle Yuen MD        Future Office Visit:     30 tablet 1     Sig: TAKE 0.5 TABLETS (25 MG) BY MOUTH DAILY FOR 4 DAYS, THEN 50 MG BY MOUTH DAILY       SSRIs Protocol Failed - 4/10/2019  1:30 AM        Failed - PHQ-9 score less than 5 in past 6 months     Please review last PHQ-9 score.   PHQ-9 SCORE 8/4/2017 5/9/2018 2/7/2019   PHQ-9 Total Score - - -   PHQ-9 Total Score 13 11 12     HEEBR-7 SCORE 1/18/2017 5/9/2018 2/7/2019   Total Score - - -   Total Score 13 13 16               Passed - Medication is active on med list        Passed - Patient is age 18 or older        Passed - No active pregnancy on record        Passed - No positive pregnancy test in last 12 months        Passed - Recent (6 mo) or future (30 days) visit within the authorizing provider's specialty     Patient had office visit in the last 6 months or has a visit in the next 30 days with authorizing provider or within the authorizing provider's specialty.  See \"Patient Info\" tab in inbasket, or \"Choose Columns\" in Meds & Orders section of the refill encounter.              "

## 2019-04-11 NOTE — TELEPHONE ENCOUNTER
Per office appointment note of 02/07/19-Moderate major depression (H)  (primary encounter diagnosis)  (F41.9) Anxiety  Comment: not controlled  Plan: sertraline (ZOLOFT) 50 MG tablet  - restart sertraline 25 mg daily for 4 days, then increase to 50 mg daily  - recheck in 4 weeks  Patient was a no show for appointment in March.    Routing refill request to provider for review/approval because:  PHQ-9 is out of range for RN refill protocol.  Patient has not followed up with a visit.  DAVID Cassidy RN

## 2019-04-11 NOTE — TELEPHONE ENCOUNTER
rx sent for 30 days without refills. Overdue for appointment. Please let know. No showed last appt    Halle De Los Santos MD  Internal Medicine/Pediatrics

## 2019-04-23 ENCOUNTER — TRANSFERRED RECORDS (OUTPATIENT)
Dept: HEALTH INFORMATION MANAGEMENT | Facility: CLINIC | Age: 54
End: 2019-04-23

## 2019-05-12 DIAGNOSIS — F32.1 MODERATE MAJOR DEPRESSION (H): ICD-10-CM

## 2019-05-12 DIAGNOSIS — F41.9 ANXIETY: ICD-10-CM

## 2019-05-12 NOTE — TELEPHONE ENCOUNTER
"Requested Prescriptions   Pending Prescriptions Disp Refills     sertraline (ZOLOFT) 50 MG tablet [Pharmacy Med Name: SERTRALINE HCL 50 MG TABLET] 30 tablet 0     Sig: TAKE 1 TABLET BY MOUTH EVERY DAY  Last Written Prescription Date:  04/11/2019  Last Fill Quantity: 30 tablet,  # refills: 1    Last office visit: 4/2/2019 with prescribing provider:  Janelle Yuen MD        Future Office Visit:   Next 5 appointments (look out 90 days)    May 15, 2019 10:20 AM CDT  PHYSICAL with Halle De Los Santos MD  [facility] [address]  [address]  [facility] [address] [phone]                SSRIs Protocol Failed - 5/12/2019  7:32 AM        Failed - PHQ-9 score less than 5 in past 6 months     Please review last PHQ-9 score.   PHQ-9 SCORE 8/4/2017 5/9/2018 2/7/2019   PHQ-9 Total Score - - -   PHQ-9 Total Score 13 11 12     HEBER-7 SCORE 1/18/2017 5/9/2018 2/7/2019   Total Score - - -   Total Score 13 13 16                 Passed - Medication is active on med list        Passed - Patient is age 18 or older        Passed - No active pregnancy on record        Passed - No positive pregnancy test in last 12 months        Passed - Recent (6 mo) or future (30 days) visit within the authorizing provider's specialty     Patient had office visit in the last 6 months or has a visit in the next 30 days with authorizing provider or within the authorizing provider's specialty.  See \"Patient Info\" tab in inbasket, or \"Choose Columns\" in Meds & Orders section of the refill encounter.              "

## 2019-05-13 ENCOUNTER — HOSPITAL ENCOUNTER (OUTPATIENT)
Facility: CLINIC | Age: 54
Discharge: HOME | End: 2019-05-13
Attending: INTERNAL MEDICINE | Admitting: INTERNAL MEDICINE
Payer: MEDICARE

## 2019-05-13 VITALS
SYSTOLIC BLOOD PRESSURE: 88 MMHG | WEIGHT: 165 LBS | HEIGHT: 61 IN | RESPIRATION RATE: 16 BRPM | DIASTOLIC BLOOD PRESSURE: 59 MMHG | OXYGEN SATURATION: 92 % | BODY MASS INDEX: 31.15 KG/M2 | HEART RATE: 68 BPM

## 2019-05-13 LAB — COLONOSCOPY: NORMAL

## 2019-05-13 PROCEDURE — 45385 COLONOSCOPY W/LESION REMOVAL: CPT | Performed by: INTERNAL MEDICINE

## 2019-05-13 PROCEDURE — G0500 MOD SEDAT ENDO SERVICE >5YRS: HCPCS | Performed by: INTERNAL MEDICINE

## 2019-05-13 PROCEDURE — 88305 TISSUE EXAM BY PATHOLOGIST: CPT | Mod: 26 | Performed by: INTERNAL MEDICINE

## 2019-05-13 PROCEDURE — 88305 TISSUE EXAM BY PATHOLOGIST: CPT | Performed by: INTERNAL MEDICINE

## 2019-05-13 PROCEDURE — 25000128 H RX IP 250 OP 636: Performed by: INTERNAL MEDICINE

## 2019-05-13 RX ORDER — ONDANSETRON 2 MG/ML
4 INJECTION INTRAMUSCULAR; INTRAVENOUS EVERY 6 HOURS PRN
Status: DISCONTINUED | OUTPATIENT
Start: 2019-05-13 | End: 2019-05-13 | Stop reason: HOSPADM

## 2019-05-13 RX ORDER — FLUMAZENIL 0.1 MG/ML
0.2 INJECTION, SOLUTION INTRAVENOUS
Status: DISCONTINUED | OUTPATIENT
Start: 2019-05-13 | End: 2019-05-13 | Stop reason: HOSPADM

## 2019-05-13 RX ORDER — ONDANSETRON 4 MG/1
4 TABLET, ORALLY DISINTEGRATING ORAL EVERY 6 HOURS PRN
Status: DISCONTINUED | OUTPATIENT
Start: 2019-05-13 | End: 2019-05-13 | Stop reason: HOSPADM

## 2019-05-13 RX ORDER — ONDANSETRON 2 MG/ML
INJECTION INTRAMUSCULAR; INTRAVENOUS PRN
Status: DISCONTINUED | OUTPATIENT
Start: 2019-05-13 | End: 2019-05-13 | Stop reason: HOSPADM

## 2019-05-13 RX ORDER — LIDOCAINE 40 MG/G
CREAM TOPICAL
Status: DISCONTINUED | OUTPATIENT
Start: 2019-05-13 | End: 2019-05-13 | Stop reason: HOSPADM

## 2019-05-13 RX ORDER — FENTANYL CITRATE 50 UG/ML
INJECTION, SOLUTION INTRAMUSCULAR; INTRAVENOUS PRN
Status: DISCONTINUED | OUTPATIENT
Start: 2019-05-13 | End: 2019-05-13 | Stop reason: HOSPADM

## 2019-05-13 RX ORDER — ONDANSETRON 2 MG/ML
4 INJECTION INTRAMUSCULAR; INTRAVENOUS
Status: DISCONTINUED | OUTPATIENT
Start: 2019-05-13 | End: 2019-05-13 | Stop reason: HOSPADM

## 2019-05-13 RX ORDER — NALOXONE HYDROCHLORIDE 0.4 MG/ML
.1-.4 INJECTION, SOLUTION INTRAMUSCULAR; INTRAVENOUS; SUBCUTANEOUS
Status: DISCONTINUED | OUTPATIENT
Start: 2019-05-13 | End: 2019-05-13 | Stop reason: HOSPADM

## 2019-05-13 ASSESSMENT — MIFFLIN-ST. JEOR: SCORE: 1295.82

## 2019-05-13 NOTE — DISCHARGE INSTRUCTIONS
Understanding Colon and Rectal Polyps     The colon has a smooth lining composed of millions of cells.     The colon (also called the large intestine) is a muscular tube that forms the last part of the digestive tract. It absorbs water and stores food waste. The colon is about 4 to 6 feet long. The rectum is the last 6 inches of the colon. The colon and rectum have a smooth lining composed of millions of cells. Changes in these cells can lead to growths in the colon that can become cancerous and should be removed.     When the Colon Lining Changes  Changes that occur in the cells that line the colon or rectum can lead to growths called polyps. Over a period of years, polyps can turn cancerous. Removing polyps early may prevent cancer from ever forming.      Polyps  Polyps are fleshy clumps of tissue that form on the lining of the colon or rectum. Small polyps are usually benign (not cancerous). However, over time, cells in a polyp can change and become cancerous. The larger a polyp grows, the more likely this is to happen. Also, certain types of polyps known as adenomatous polyps are considered premalignant. This means that they will almost always become cancerous if they re not removed.          Cancer  Almost all colorectal cancers start when polyp cells begin growing abnormally. As a cancerous tumor grows, it may involve more and more of the colon or rectum. In time, cancer can also grow beyond the colon or rectum and spread to nearby organs or to glands called lymph nodes. The cells can also travel to other parts of the body. This is known as metastasis. The earlier a cancerous tumor is removed, the better the chance of preventing its spread.        [id number] [facility], [address] [place] [address], [place] [address]. All rights reserved. This information is not intended as a substitute for professional medical care. Always follow your healthcare professional's instructions.

## 2019-05-13 NOTE — LETTER
March 4, 2019      Cristela MEHNAZ Smith  [address] [address] [facility] [address] [address]        Thank you for choosing [facility]. You are scheduled for the following service(s).       Date  Monday, April 1, 2019      Procedure:    Colonoscopy   Doctor:     Dr. Kyler Alfredo     Arrival Time:  0730  *check in at Emergency/Endoscopy desk*  Procedure Time:  0800    Location:   [facility]      Endoscopy Department, First Floor (Enter through ER Doors) *       [address] [place], [place] [address]    [phone] or [phone] () to reschedule       MIRALAX/GATORADE DOUBLE PREP    Purchase the following supplies at your local pharmacy:    2 (two)- Bisacodyl tablets   (Dulcolax  laxative NOT Dulcolax  stool softener) each tablet contains 5 mg of bisacodyl  2 (two) - 8.3 ounce bottle of Polyethylene Glycol (PEG) 3350 Powder   (MiraLAX, SmoothLAX, ClearLAX or generic equivalent)  128 oz. Gatorade  (No red colored flavors)  Regular Gatorade , Gatorade G2 , Powerade , Powerade Zero  or Pedialyte  are acceptable. Red flavors are not allowed; all other colors (yellow, green, orange, purple, blue) are okay. It is also okay to buy two 2.12 oz packets of powdered Gatorade that can be mixed with water to a total volume of 64 oz of liquid.  1 - 10 oz. bottle Magnesium Citrate (No red colored flavors)  It is also okay for you to use a 0.5 ounce package of powdered magnesium citrate (17 grams) mixed with 10 ounces of water.        PREPARATION FOR COLONOSCOPY    Transportation:  You must arrange for a ride for the day of your procedure with a responsible adult. A taxi ride is not an option unless you are accompanied by a responsible adult. If you fail to arrange transportation with a responsible adult, your procedure will be cancelled and rescheduled.      7 days before:    Discontinue fiber supplements and medications containing iron. This includes multivitamins with iron,  Metamucil and Fibercon.     3 days before:     Begin a Low-Fiber Diet. A low fiber diet helps make the cleanout more effective.     Examples of a low fiber diet include (but are not limited to): White bread, white rice, pasta, crackers, fish, chicken, eggs, ground beef, creamy peanut butter, cooked/steamed/boiled vegetables, canned fruit, bananas, melons, milk, plain yogurt cheese, salad dressing and other condiments.     The following are not allowed on a low fiber diet: Seeds, nuts, popcorn, bran, whole wheat, corn, quinoa, raw fruits and vegetables, berries and dried fruit, beans and lentils.    For additional details on following a low fiber diet, please see attached information sheet    2 days before:    Stop eating solid foods in the morning.    Begin Clear Liquid Diet. (Clear liquids include things you can see through).     Examples of a clear liquid diet include: Water, tea , coffee ,no milk or non-dairy creamer), clear broth or bouillon, Gatorade, Pedialyte or Powerade, carbonated and non-carbonated soft drinks(Sprite , 7-Up , Gingerale), strained fruit juices without pulp (apple, white grape, white cranberry), Jello-O  and popsicles     The following are not allowed on a clear liquid diet: Red liquids, alcoholic beverages, coffee, dairy products, protein shakes, cream broths, juice with pulp and chewing tobacco.    Between 4-6pm: Drink Miralax - Gatorade preparation, mix 1 (one) bottle of Miralax with 64 oz. of Gatorade in a large pitcher.  Drink 1 (one) - 8 oz. glass every 15 minutes thereafter until the mixture is gone.      1 day before:    Continue clear liquid diet    At noon: Take 2 Bisacodyl (Dulcolax) tablets     Between 4-6pm: Drink Miralax - Gatorade preparation Between 4-6pm: Drink Miralax - Gatorade preparation, mix 1 (one) bottle of Miralax with 64 oz. of Gatorade in a large pitcher.    Drink 1 (one) - 8 oz. glass every 15 minutes thereafter until the mixture is gone.    Colon Cleansing Tips:  Drink adequate amounts of fluid before and after your colon cleansing to prevent dehydration. Stay near a toilet because you will have diarrhea. Even if you are sitting on the toilet, continue to drink the cleansing solution every 15 minutes. If you feel nauseous or vomit, rinse your mouth with water, take a 15 to 30-minute break and then continue drinking the solution. You will be uncomfortable until the stool has flushed from your colon (in about 2-4 hours). You may feel chilled.    Day of your procedure:  You may take all of your morning medications including blood pressure medications, blood thinners (if you have not been instructed to stop these by our office), methadone, anti-seizure medications with sips of water 3 hours prior to your procedure or earlier. Do not take insulin or vitamins prior to your procedure.  Continue the Clear Liquid Diet. Avoid red liquids, alcoholic beverages, coffee, dairy products, protein shakes, and chewing tobacco.      4 hours prior: Drink 10 oz magnesium citrate     3 hours prior:     STOP consuming all liquids     Do not take anything by mouth during this time.     Allow extra time to travel to your procedure as you may need to stop and use a restroom along the way.    You are ready for the procedure, if you followed all instructions and your stool is no longer formed, but clear or yellow liquid. If you are unsure whether your colon is clean, please call our office at [phone] before you leave for your appointment.      Canceling or Rescheduling your appointment, please call [phone] as soon as possible.        Bring the following to your procedure:    Insurance Card / Photo ID     List of Current Medications including over-the-counter medications and supplements     Bring your rescue inhaler if you currently use one to control asthma     DIRECTIONS    From the north ([place], [place], [place])  Take [address] south, exit to [address] Get into the left hand  darian, turn left (east), go one-half mile to [address]. Turn left (north) on [address]. Go north to first stoplight, take a right on the newly constructed road, [address] and follow it to the Emergency Entrance  From the south ([facility])  Take 35 north to the east split, [address], and exit to [address]. Turn left (west) on [address]  to [address]. Turn right (north) on [address]. Go north to first stoplight, take a right on the newly constructed road, [address] and [facility] it [facility]   From the east via [address] ([facility])  Take [address] south to [place] [address] exit. Turn right on [address]. Go west to [address]. Turn right (north) on [address], go to the first stoplight, take a right and follow the newly constructed road, [address], to the Emergency Entrance   From the east via [address] ([facility])  Take [address] west to [address]. Turn left (south) on [address] to [address]. Turn left (east) on [address] and follow the newly constructed road [facility]   From the west via [address] ([place], [place])  Take [address] east to [address]. Turn right (south) on [address] to [address].  Turn left (east) on [address] and follow the newly constructed road Emergency Entrance      PRE-PROCEDURE CHECKLIST    If you have diabetes, ask your regular doctor for diet and medication restrictions.  If you take a medication to thin your blood (such as Coumadin or Lovenox) and have not already discussed this please call your primary doctor to advice on holding this medication  If you take aspirin or Plavix,  you may continue to do so.  If you are or may be pregnant, please discuss the risks and benefits of this procedure with your doctor.        You must arrange for a ride for the day of your exam. If you fail to arrange transportation with a responsible adult, your procedure will  need to be cancelled and rescheduled. Taxi ,Bus and Medical transport are not acceptable unless you have a responsible adult that you know & trust with you.  Please arrange for this  to be able to pick you up in our department, approximately one hour after your scheduled procedure, if they are not able to stay with you.  *If you must cancel or reschedule your appointment, please call Endoscopy [facility] at [phone].*      What happens during a colonoscopy?    Plan to spend up to two hours, starting at registration time, at the endoscopy center the day of your procedure. The exam itself takes about 15 minutes to complete, and recovery 30 minutes.     Before the exam:    You will change into a gown.    Your medical history will be reviewed with you and you will be given a consent form to sign.     A nurse will insert an intravenous (IV) line into your hand or arm.    During the exam:     Medicine will be given through the IV line to help you relax and feel drowsy.     Your heart rate and oxygen levels will be monitored. If your blood pressure is low, you may be given fluids through the IV line.     The doctor will insert a flexible hollow tube, called a colonoscope, into your rectum.   o The scope will be advanced slowly through the large intestine (colon).    You may have a feeling of pressure or fullness.     If an abnormal tissue, or a polyp are found, the doctor may remove it through the endoscope for closer examination, or biopsy. Tissue removal is painless.    What happens after the exam?        Your doctor will talk with you about the initial results of your exam.    The doctor will prepare a full report for the physician who referred you for the colonoscopy.    You may feel bloated after the procedure. This is normal.     Medication given during the exam will prohibit you from driving for the rest of the day.     Following the exam, you may resume your normal diet. Your first meal should be light, no  greasy foods. Avoid alcohol until the next day.     You may resume your regular activities the day after the procedure.     A nurse will provide you with complete discharge instructions before you leave the endoscopy center. Be sure to ask the nurse for specific instructions if you take blood thinners such as aspirin, Coumadin or Plavix.     Any tissue samples removed during the exam will be sent to a lab for evaluation. It may take 5-7 working days for you to be notified of the results.       Low-Fiber Diet    A low-fiber diet limits the amount of food waste that has to move through the large intestine.    Foods Recommended Foods to Avoid   Breads, Cereal, Rice and Pasta:   White bread, rolls, biscuits, and croissant, sil toast   Waffles, [place] toast, and pancakes   White rice, noodles, pasta, macaroni and peeled cooked potatoes   Plain crackers, Saltines   Cooked cereals: farina, Cream of Rice   Cold cereals: Puffed Rice, Rice Krispies, Corn Flakes, and Special K Breads, Cereal, Rice and Pasta:   Breads or rolls with nuts, seeds or fruit   Whole wheat, pumpernickel, rye breads and cornbread   Potatoes with skin, brown or wild rice, and kasha (buckwheat)     Vegetables:    Tender cooked and canned vegetables without seeds: carrots, asparagus tips, green or wax beans, pumpkin, spinach, lima beans   Vegetables:   Raw or steamed vegetables   Vegetables with seeds   Sauerkraut   Winter squash, peas, broccoli, [place] sprouts, cabbage, onions, cauliflower, baked beans, peas and corn     Fruits:   Strained fruit juice   canned fruit, except pineapple   ripe bananas   melons Fruits:   prunes and prune juice   raw or dried fruit   all berries, figs, dates and raisins     Milk/Dairy:   milk, plain or flavored   yogurt, custard, and ice cream   cheese and cottage cheese Milk/Dairy:   Yogurt with nuts or seeds   Meat and other proteins:   ground, wellcooked tender beef, lamb, ham, veal, pork, fish, poultry, and organ  meats   eggs   peanut butter without nuts  Fats, Snack, Sweets, Condiments, and Beverages:   Margarine, butter, oils, mayonnaise, sour cream, and salad dressing   Plain graviesbouillon, broth, and soups made with allowed vegetables   Coffee, tea, and carbonated drinks   Plain cakes and cookies   Gelatin, plain puddings, custard, ice cream, sherbet, Popsicles   Hard candy or pretzels   Ketchup, mustard   Sugar, clear jelly, honey, and syrup   Spices, cooked herbs,    Meat and other proteins:   tough, fibrous meats with gristle   dry beans, peas, and lentils   peanut butter with nuts   Tofu  Fats, Snack, Sweets, Condiments, and Beverages:   Nuts, seeds, and coconut   Jam, marmalade, and preserves   Pickles, olives, relish, and horseradish   All desserts containing nuts, seeds, dried fruit, coconut, or made from whole grains or bran   Candy made with nuts or seeds   popcorn

## 2019-05-13 NOTE — LETTER
March 29, 2019      Cristela Smith  [address] [facility] [address]         Thank you for choosing [facility]. You are scheduled for the following service.    Date:   Monday May 13, 2019  Procedure: COLONOSCOPY   Doctor: Dr Alfredo           Arrival Time:  1000  *Check in at Emergency/Endoscopy desk*  Procedure Time: 1030     Location:   [facility]        Endoscopy Department, First Floor (Enter through ER Doors) *        [address] [place], [place] [address]      [phone] or [phone] () to reschedule     Colonoscopy is the most accurate test to detect colon polyps and colon cancer; and the only test where polyps can be removed. During this procedure, a doctor examines the lining of your large intestine and rectum through a flexible tube.     Transportation  Arrange for a ride for the day of your procedure with a responsible adult.  A taxi ride is not an option unless you are accompanied by a responsible adult. If you fail to arrange transportation with a responsible adult, your procedure will be cancelled and rescheduled    MIRALAX -GATORADE  DOUBLE PREP  Purchase the  following supplies at your local pharmacy:  - 2 (two) bisacodyl tablets: each tablet contains 5 mg.  (Dulcolax  laxative NOT Dulcolax  stool softener)   - 2 (two) 8.3 oz bottles of Polyethylene Glycol (PEG) 3350 Powder   (MiraLAX , Smooth LAX , ClearLAX  or equivalent)  - 128 oz Gatorade    Regular Gatorade , Gatorade G2 , Powerade , Powerade Zero  or Pedialyte  is acceptable. Red colored flavors are not allowed; all other colors (yellow, green, orange, purple and blue) are okay. It is also okay to buy four 2.12 oz packets of powdered Gatorade that can be mixed with water to a total volume of 128 oz of liquid.  - 1 (one) 10 oz bottle of Magnesium Citrate (Red colored flavors are not allowed)  It is also okay for you to use a 0.5 oz package of powdered magnesium  citrate (17 g) mixed with 10 oz of water.      PREPARATION FOR COLONOSCOPY    7 days before:    Discontinue fiber supplements and medications containing iron. This includes Metamucil  and Fibercon ; and multivitamins with iron.  3 days before:     Begin a low-fiber diet. A low-fiber diet helps make the cleanout more effective.     Examples of a low-fiber diet include (but are not limited to): white bread, white rice, pasta, crackers, fish, chicken, eggs, ground beef, creamy peanut butter, cooked/steamed/boiled vegetables, canned fruit, bananas, melons, milk, plain yogurt cheese, salad dressing and other condiments.     The following are not allowed on a low-fiber diet: seeds, nuts, popcorn, bran, whole wheat, corn, quinoa, raw fruits and vegetables, berries and dried fruit, beans and lentils.    For additional details on low-fiber diet, please refer to the table on the last page.  2 days before:    Stop eating solid foods in the morning.    Begin a clear liquid diet (liquids you can see through).     Examples of a clear liquid diet include: water, tea, clear broth or bouillon, Gatorade, Pedialyte or Powerade, carbonated and non-carbonated soft drinks (Sprite , 7-Up , ginger ale), strained fruit juices without pulp (apple, white grape, white cranberry), Jell-O  and popsicles.     The following are not allowed on a clear liquid diet: red liquids, alcoholic beverages,  dairy products (milk, creamer and yogurt), protein shakes, creamy broths, juice with pulp and chewing tobacco.    At 4 pm (and no later than 6pm): start drinking the Miralax-Gatorade preparation (8.3 oz of Miralax mixed with 64 oz of Gatorade in a large pitcher). Drink 1 (one) 8 oz glass every 15 minutes thereafter, until the mixture is gone.  1 day before:    Continue the clear liquid diet.    At noon: take 2 (two) bisacodyl tablets.     At 4 pm (and no later than 6pm): start drinking the Miralax-Gatorade preparation (8.3 oz of Miralax mixed with 64 oz  of Gatorade in a large pitcher). Drink 1 (one) 8 oz glass every 15 minutes thereafter, until the mixture is gone.    COLON CLEANSING TIPS: drink adequate amounts of fluids before and after your colon cleansing to prevent dehydration. Stay near a toilet because you will have diarrhea. Even if you are sitting on the toilet, continue to drink the cleansing solution every 15 minutes. If you feel nauseous or vomit, rinse your mouth with water, take a 15 to 30-minute-break and then continue drinking the solution. You will be uncomfortable until the stool has flushed from your colon (in about 2 to 4 hours). You may feel chilled.    Day of your procedure  You may take all of your morning medications including blood pressure medications, blood thinners (if you have not been instructed to stop these by our office), methadone, anti-seizure medications with sips of water 3 hours prior to your procedure or earlier. Do not take insulin or vitamins prior to your procedure. Continue the clear liquid diet.   4 hours prior: Drink 10 oz magnesium citrate. It may be easier to drink it with a straw.     STOP consuming all liquids after that.     Do not take anything by mouth during this time.     Allow extra time to travel to your procedure as you may need to stop and use a restroom along the way.  You are ready for the procedure, if you followed all instructions and your stool is no longer formed, but clear or yellow liquid. If you are unsure whether your colon is clean, please call our office at [phone] before you leave for your appointment.  Bring the following to your procedure:  - Insurance Card/Photo ID.   - List of current medications including over-the-counter medications and supplements.   - Your rescue inhaler if you currently use one to control asthma.     Canceling or rescheduling your appointment:  If you must cancel or reschedule your appointment, please call [phone] as soon as possible.    COLONOSCOPY  PRE-PROCEDURE CHECKLIST  If you have diabetes, ask your regular doctor for diet and medication restrictions.  If you take an anticoagulant or anti-platelet medication (such as Coumadin , Lovenox , Pradaxa , Xarelto , Eliquis , etc.), please call your primary doctor for advice on holding this medication.  If you take aspirin you may continue to do so.  If you are or may be pregnant, please discuss the risks and benefits of this procedure with your doctor.    What happens during a colonoscopy?    Plan to spend up to two hours, starting at registration time, at the endoscopy center the day of your procedure. The colonoscopy takes an average of 15 to 30 minutes. Recovery time is about 30 minutes.    Before the exam:    You will change into a gown.    Your medical history and medication list will be reviewed with you, unless that has been done over the phone prior to the procedure.     A nurse will insert an intravenous (IV) line into your hand or arm.    The doctor will meet with you and will give you a consent form to sign.    During the exam:     Medicine will be given through the IV line to help you relax.     Your heart rate and oxygen levels will be monitored. If your blood pressure is low, you may be given fluids through the IV line.     The doctor will insert a flexible hollow tube, called a colonoscope, into your rectum. The scope will be advanced slowly through the large intestine (colon).    You may have a feeling of fullness or pressure.     If an abnormal tissue or a polyp is found, the doctor may remove it through the endoscope for closer examination, or biopsy. Tissue removal is painless    After the exam:           Any tissue samples removed during the exam will be sent to a lab for evaluation. It may take 5-7 working days for you to be notified of the results.     A nurse will provide you with complete discharge instructions before you leave the endoscopy center. Be sure to ask the nurse for specific  instructions if you take blood thinners such as Aspirin, Coumadin or Plavix.     The doctor will prepare a full report for you and for the physician who referred you for the procedure.     Your doctor will talk with you about the initial results of your exam.      Medication given during the exam will prohibit you from driving for the rest of the day.     Following the exam, you may resume your normal diet. Your first meal should be light, no greasy foods. Avoid alcohol until the next day.     You may resume your regular activities the day after the procedure.     LOW-FIBER DIET  Foods RECOMMENDED Foods to AVOID   Breads, Cereal, Rice and Pasta:   White bread, rolls, biscuits, croissant and sil toast.   Waffles, [place]vorian toast and pancakes.   White rice, noodles, pasta, macaroni and peeled cooked potatoes.   Plain crackers and saltines.   Cooked cereals: farina, cream of rice.   Cold cereals: Puffed Rice , Rice Krispies , Corn Flakes  and Special K    Breads, Cereal, Rice and Pasta:   Breads or rolls with nuts, seeds or fruit.   Whole wheat, pumpernickel, rye breads and cornbread.   Potatoes with skin, brown or wild rice, and kasha (buckwheat).     Vegetables:   Tender cooked and canned vegetables without seeds: carrots, asparagus tips, green or wax beans, pumpkin, spinach, lima beans. Vegetables:   Raw or steamed vegetables.   Vegetables with seeds.   Sauerkraut.   Winter squash, peas, broccoli, Brussel sprouts, cabbage, onions, cauliflower, baked beans, peas and corn.   Fruits:   Strained fruit juice.   Canned fruit, except pineapple.   Ripe bananas and melon. Fruits:   Prunes and prune juice.   Raw fruits.   Dried fruits: figs, dates and raisins.   Milk/Dairy:   Milk: plain or flavored.   Yogurt, custard and ice cream.   Cheese and cottage cheese Milk/Dairy:     Meat and other proteins:   ground, well-cooked tender beef, lamb, ham, veal, pork, fish, poultry and organ meats.   Eggs.   Peanut butter without nuts.  Meat and other proteins:   Tough, fibrous meats with gristle.   Dry beans, peas and lentils.   Peanut butter with nuts.   Tofu.   Fats, Snack, Sweets, Condiments and Beverages:   Margarine, butter, oils, mayonnaise, sour cream and salad dressing, plain gravy.   Sugar, hard candy, clear jelly, honey and syrup.   Spices, cooked herbs, bouillon, broth and soups made with allowed vegetable, ketchup and mustard.   Coffee, tea and carbonated drinks.   Plain cakes, cookies and pretzels.   Gelatin, plain puddings, custard, ice cream, sherbet and popsicles. Fats, Snack, Sweets, Condiments and Beverages:   Nuts, seeds and coconut.   Jam, marmalade and preserves.   Pickles, olives, relish and horseradish.   All desserts containing nuts, seeds, dried fruit and coconut; or made from whole grains or bran.   Candy made with nuts or seeds.   Popcorn.       DIRECTIONS TO THE ENDOSCOPY DEPARTMENT     From the north ([facility])  Take [address], exit on [address] [address]. Get into the left hand darian, turn left (east), go one-half mile to [address] and turn left. Go north to the first stoplight, take a right on [address] and follow it to the Emergency entrance.  From the south ([facility])  Take 35N to the 35E split and exit on [address] . On [address] , turn left (west) to [address]. Turn right (north) on [address]. Go north to the first stoplight, take a right on [address] and follow it to the Emergency entrance.    From the east via 35E ([facility])  Take [address]E south to [address] [address] exit. Turn right on [address]. Go west to [address]. Turn right (north) on [address]. Go to the first stoplight, take a right and follow on [address] to the Emergency entrance.  From the east via [address] ([facility])  Take [address] to [address]. Turn left (south) on [address] to [address]. Turn left (east) on [address] and  follow it to the Emergency entrance.    From the west via [address] ([place], [place])  Take [address] east to [address]. Turn right (south) on [address] to [address]. Turn left (east) on [address] and follow it to the Emergency entrance.  Thank you for choosing [facility]. You are scheduled for the following service.    Date:  Monday May 13, 2019  Procedure: COLONOSCOPY   Doctor: Dr Alfredo           Arrival Time:  1000  *Check in at Emergency/Endoscopy desk*  Procedure Time: 1030     Location:   [facility]        Endoscopy Department, First Floor (Enter through ER Doors) *        [address] [place], [place] [address]      [phone] or [phone] () to reschedule     Colonoscopy is the most accurate test to detect colon polyps and colon cancer; and the only test where polyps can be removed. During this procedure, a doctor examines the lining of your large intestine and rectum through a flexible tube.     Transportation  Arrange for a ride for the day of your procedure with a responsible adult.  A taxi ride is not an option unless you are accompanied by a responsible adult. If you fail to arrange transportation with a responsible adult, your procedure will be cancelled and rescheduled    MIRALAX -GATORADE  DOUBLE PREP  Purchase the  following supplies at your local pharmacy:  - 2 (two) bisacodyl tablets: each tablet contains 5 mg.  (Dulcolax  laxative NOT Dulcolax  stool softener)   - 2 (two) 8.3 oz bottles of Polyethylene Glycol (PEG) 3350 Powder   (MiraLAX , Smooth LAX , ClearLAX  or equivalent)  - 128 oz Gatorade    Regular Gatorade , Gatorade G2 , Powerade , Powerade Zero  or Pedialyte  is acceptable. Red colored flavors are not allowed; all other colors (yellow, green, orange, purple and blue) are okay. It is also okay to buy four 2.12 oz packets of powdered Gatorade that can be mixed with water to a total volume of 128  oz of liquid.  - 1 (one) 10 oz bottle of Magnesium Citrate (Red colored flavors are not allowed)  It is also okay for you to use a 0.5 oz package of powdered magnesium citrate (17 g) mixed with 10 oz of water.      PREPARATION FOR COLONOSCOPY    7 days before:    Discontinue fiber supplements and medications containing iron. This includes Metamucil  and Fibercon ; and multivitamins with iron.  3 days before:     Begin a low-fiber diet. A low-fiber diet helps make the cleanout more effective.     Examples of a low-fiber diet include (but are not limited to): white bread, white rice, pasta, crackers, fish, chicken, eggs, ground beef, creamy peanut butter, cooked/steamed/boiled vegetables, canned fruit, bananas, melons, milk, plain yogurt cheese, salad dressing and other condiments.     The following are not allowed on a low-fiber diet: seeds, nuts, popcorn, bran, whole wheat, corn, quinoa, raw fruits and vegetables, berries and dried fruit, beans and lentils.    For additional details on low-fiber diet, please refer to the table on the last page.  2 days before:    Stop eating solid foods in the morning.    Begin a clear liquid diet (liquids you can see through).     Examples of a clear liquid diet include: water, tea, clear broth or bouillon, Gatorade, Pedialyte or Powerade, carbonated and non-carbonated soft drinks (Sprite , 7-Up , ginger ale), strained fruit juices without pulp (apple, white grape, white cranberry), Jell-O  and popsicles.     The following are not allowed on a clear liquid diet: red liquids, alcoholic beverages,  dairy products (milk, creamer and yogurt), protein shakes, creamy broths, juice with pulp and chewing tobacco.    At 4 pm (and no later than 6pm): start drinking the Miralax-Gatorade preparation (8.3 oz of Miralax mixed with 64 oz of Gatorade in a large pitcher). Drink 1 (one) 8 oz glass every 15 minutes thereafter, until the mixture is gone.  1 day before:    Continue the clear liquid  diet.    At noon: take 2 (two) bisacodyl tablets.     At 4 pm (and no later than 6pm): start drinking the Miralax-Gatorade preparation (8.3 oz of Miralax mixed with 64 oz of Gatorade in a large pitcher). Drink 1 (one) 8 oz glass every 15 minutes thereafter, until the mixture is gone.    COLON CLEANSING TIPS: drink adequate amounts of fluids before and after your colon cleansing to prevent dehydration. Stay near a toilet because you will have diarrhea. Even if you are sitting on the toilet, continue to drink the cleansing solution every 15 minutes. If you feel nauseous or vomit, rinse your mouth with water, take a 15 to 30-minute-break and then continue drinking the solution. You will be uncomfortable until the stool has flushed from your colon (in about 2 to 4 hours). You may feel chilled.    Day of your procedure  You may take all of your morning medications including blood pressure medications, blood thinners (if you have not been instructed to stop these by our office), methadone, anti-seizure medications with sips of water 3 hours prior to your procedure or earlier. Do not take insulin or vitamins prior to your procedure. Continue the clear liquid diet.   4 hours prior: Drink 10 oz magnesium citrate. It may be easier to drink it with a straw.     STOP consuming all liquids after that.     Do not take anything by mouth during this time.     Allow extra time to travel to your procedure as you may need to stop and use a restroom along the way.  You are ready for the procedure, if you followed all instructions and your stool is no longer formed, but clear or yellow liquid. If you are unsure whether your colon is clean, please call our office at [phone] before you leave for your appointment.  Bring the following to your procedure:  - Insurance Card/Photo ID.   - List of current medications including over-the-counter medications and supplements.   - Your rescue inhaler if you currently use one to control asthma.      Canceling or rescheduling your appointment:  If you must cancel or reschedule your appointment, please call [phone] as soon as possible.    COLONOSCOPY PRE-PROCEDURE CHECKLIST  If you have diabetes, ask your regular doctor for diet and medication restrictions.  If you take an anticoagulant or anti-platelet medication (such as Coumadin , Lovenox , Pradaxa , Xarelto , Eliquis , etc.), please call your primary doctor for advice on holding this medication.  If you take aspirin you may continue to do so.  If you are or may be pregnant, please discuss the risks and benefits of this procedure with your doctor.    What happens during a colonoscopy?    Plan to spend up to two hours, starting at registration time, at the endoscopy center the day of your procedure. The colonoscopy takes an average of 15 to 30 minutes. Recovery time is about 30 minutes.    Before the exam:    You will change into a gown.    Your medical history and medication list will be reviewed with you, unless that has been done over the phone prior to the procedure.     A nurse will insert an intravenous (IV) line into your hand or arm.    The doctor will meet with you and will give you a consent form to sign.    During the exam:     Medicine will be given through the IV line to help you relax.     Your heart rate and oxygen levels will be monitored. If your blood pressure is low, you may be given fluids through the IV line.     The doctor will insert a flexible hollow tube, called a colonoscope, into your rectum. The scope will be advanced slowly through the large intestine (colon).    You may have a feeling of fullness or pressure.     If an abnormal tissue or a polyp is found, the doctor may remove it through the endoscope for closer examination, or biopsy. Tissue removal is painless    After the exam:           Any tissue samples removed during the exam will be sent to a lab for evaluation. It may take 5-7 working days for you to be notified  of the results.     A nurse will provide you with complete discharge instructions before you leave the endoscopy center. Be sure to ask the nurse for specific instructions if you take blood thinners such as Aspirin, Coumadin or Plavix.     The doctor will prepare a full report for you and for the physician who referred you for the procedure.     Your doctor will talk with you about the initial results of your exam.      Medication given during the exam will prohibit you from driving for the rest of the day.     Following the exam, you may resume your normal diet. Your first meal should be light, no greasy foods. Avoid alcohol until the next day.     You may resume your regular activities the day after the procedure.     LOW-FIBER DIET  Foods RECOMMENDED Foods to AVOID   Breads, Cereal, Rice and Pasta:   White bread, rolls, biscuits, croissant and sil toast.   Waffles, [place]an toast and pancakes.   White rice, noodles, pasta, macaroni and peeled cooked potatoes.   Plain crackers and saltines.   Cooked cereals: farina, cream of rice.   Cold cereals: Puffed Rice , Rice Krispies , Corn Flakes  and Special K    Breads, Cereal, Rice and Pasta:   Breads or rolls with nuts, seeds or fruit.   Whole wheat, pumpernickel, rye breads and cornbread.   Potatoes with skin, brown or wild rice, and kasha (buckwheat).     Vegetables:   Tender cooked and canned vegetables without seeds: carrots, asparagus tips, green or wax beans, pumpkin, spinach, lima beans. Vegetables:   Raw or steamed vegetables.   Vegetables with seeds.   Sauerkraut.   Winter squash, peas, broccoli, Brussel sprouts, cabbage, onions, cauliflower, baked beans, peas and corn.   Fruits:   Strained fruit juice.   Canned fruit, except pineapple.   Ripe bananas and melon. Fruits:   Prunes and prune juice.   Raw fruits.   Dried fruits: figs, dates and raisins.   Milk/Dairy:   Milk: plain or flavored.   Yogurt, custard and ice cream.   Cheese and cottage cheese  Milk/Dairy:     Meat and other proteins:   ground, well-cooked tender beef, lamb, ham, veal, pork, fish, poultry and organ meats.   Eggs.   Peanut butter without nuts. Meat and other proteins:   Tough, fibrous meats with gristle.   Dry beans, peas and lentils.   Peanut butter with nuts.   Tofu.   Fats, Snack, Sweets, Condiments and Beverages:   Margarine, butter, oils, mayonnaise, sour cream and salad dressing, plain gravy.   Sugar, hard candy, clear jelly, honey and syrup.   Spices, cooked herbs, bouillon, broth and soups made with allowed vegetable, ketchup and mustard.   Coffee, tea and carbonated drinks.   Plain cakes, cookies and pretzels.   Gelatin, plain puddings, custard, ice cream, sherbet and popsicles. Fats, Snack, Sweets, Condiments and Beverages:   Nuts, seeds and coconut.   Jam, marmalade and preserves.   Pickles, olives, relish and horseradish.   All desserts containing nuts, seeds, dried fruit and coconut; or made from whole grains or bran.   Candy made with nuts or seeds.   Popcorn.       DIRECTIONS TO THE ENDOSCOPY DEPARTMENT     From the north ([facility])  Take [address] South, exit on [address] [address]. Get into the left hand darian, turn left (east), go one-half mile to [address] and turn left. Go north to the first stoplight, take a right on [address] and follow it to the Emergency entrance.  From the south ([facility])  Take 35N to the 35E split and exit on [address] [address]. On [address] [address], turn left (west) to [address]. Turn right (north) on [address]. Go north to the first stoplight, take a right on [address] and follow it to the Emergency entrance.    From the east via 35E ([facility])  Take 35E south to [address] [address] exit. Turn right on [address] [address]. Go west to [address]. Turn right (north) on [address]. Go to the first stoplight, take a right and follow on [address] to the Emergency entrance.  From the east  via [address] ([place])  Take [address] to [address]. Turn left (south) on [address] to [address]. Turn left (east) on [address] and follow it to the Emergency entrance.    From the west via [address] ([place], [place])  Take [address] east to [address]. Turn right (south) on [address] to [address]. Turn left (east) on [address] and follow it to the Emergency entrance.

## 2019-05-13 NOTE — PRE-PROCEDURE
Pre-Endoscopy History and Physical     Cristela Smith MRN# [id number]   YOB: 1965 Age: 54 year old     Date of Procedure: 5/13/2019  Primary care provider: Halle De Los Santos  Type of Endoscopy: colonoscopy  Reason for Procedure: f/u polyp, rectal bleeding, constipation  Type of Anesthesia Anticipated: Moderate (conscious) sedation    HPI:    Cristela is a 54 year old female who will be undergoing the above procedure.      A history and physical has been performed. The patient's medications and allergies have been reviewed. The risks and benefits of the procedure and the sedation options and risks were discussed with the patient.  All questions were answered and informed consent was obtained.      Allergies   Allergen Reactions     Keflex [Cephalexin] Swelling     Aspirin      Sulfa Drugs      Tongue and roof of mouth feels burning sensation. 20 years ago     Topamax [Topiramate] Itching and Swelling     Toprol Xl [Metoprolol]      Adhesive Tape Rash        Current Facility-Administered Medications   Medication     0.9% sodium chloride BOLUS     lidocaine (LMX4) kit     lidocaine 1 % 0.1-1 mL     ondansetron (ZOFRAN) injection 4 mg     ondansetron (ZOFRAN) injection     sodium chloride (PF) 0.9% PF flush 3 mL     sodium chloride (PF) 0.9% PF flush 3 mL     sodium chloride (PF) 0.9% PF flush 3 mL       Patient Active Problem List   Diagnosis     Hyperlipidemia LDL goal <130     Moderate major depression (H)     Anxiety     Hypertension goal BP (blood pressure) < 140/90     Cervicalgia     Post concussive syndrome     SLAP tear of shoulder     Adenomatous colon polyp     Chronic pain syndrome     Complex regional pain syndrome of right upper extremity     Prediabetes     Simple chronic bronchitis (H)     Mild reactive airways disease, unspecified whether persistent        Past Medical History:   Diagnosis Date     Allergic state      Anxiety      Cervical cancer (H)      Complex regional pain  syndrome type 1 affecting both upper arms      Depressive disorder      Fibromyalgia      Hyperlipidemia LDL goal <130      Hypertension      Moderate major depression (H)      Narcotic abuse in remission (H)     had treatment at [facility]        Past Surgical History:   Procedure Laterality Date     BACK SURGERY       CHOLECYSTECTOMY  1994    cervical     COLONOSCOPY       HYSTERECTOMY, CARTER       L5-S1 laminectomy         Social History     Tobacco Use     Smoking status: Current Every Day Smoker     Packs/day: 1.00     Years: 20.00     Pack years: 20.00     Types: Cigarettes     Smokeless tobacco: Never Used   Substance Use Topics     Alcohol use: No     Alcohol/week: 0.0 oz       Family History   Problem Relation Age of Onset     Depression Brother         brother comitted suicide in dec     Diabetes Mother      Heart Disease Mother      Cerebrovascular Disease Maternal Grandfather      Skin Cancer Paternal Grandfather      Heart Disease Maternal Uncle      Colon Cancer Maternal Uncle      Heart Disease Paternal Uncle      Brain Cancer Paternal Uncle      Heart Disease Maternal Aunt      Breast Cancer Maternal Aunt      Colon Cancer Other             Medications:     Medications Prior to Admission   Medication Sig Dispense Refill Last Dose     atorvastatin (LIPITOR) 20 MG tablet Take 1 tablet (20 mg) by mouth At Bedtime 90 tablet 3 Past Week at Unknown time     Cholecalciferol (VITAMIN D) 2000 UNITS tablet Take 2,000 Units by mouth daily 100 tablet 0 Past Week at Unknown time     cyabnocobalamin (VITAMIN B-12) 2500 MCG sublingual tablet Place 2,500 mcg under the tongue daily 90 tablet 2 Past Week at Unknown time     morphine (MS CONTIN) 15 MG CR tablet TAKE 1 TABLET BY MOUTH 2 TIMES DAILY 10/16/18-11/14/18  0 Past Month at Unknown time     naloxegol 25 MG TABS tablet Take 1 tablet (25 mg) by mouth every morning (before breakfast) 30 tablet 11 Past Week at Unknown time     oxyCODONE (ROXICODONE) 5 MG IR tablet Take  2 tablets (10 mg) by mouth every 4 hours as needed for pain 18 tablet 0 5/12/2019 at Unknown time     sertraline (ZOLOFT) 50 MG tablet Take 1 tablet (50 mg) by mouth daily 30 tablet 1 5/12/2019 at Unknown time     vitamin E 400 UNIT capsule Take by mouth daily 30 capsule 0 Past Week at Unknown time     albuterol (PROAIR HFA/PROVENTIL HFA/VENTOLIN HFA) 108 (90 Base) MCG/ACT inhaler Inhale 2 puffs into the lungs every 6 hours as needed for shortness of breath / dyspnea or wheezing 18 g 1      beclomethasone HFA (QVAR REDIHALER) 80 MCG/ACT inhaler Inhale 1 puff into the lungs 2 times daily 10.6 g 1      benzonatate (TESSALON) 200 MG capsule Take 1 capsule (200 mg) by mouth 3 times daily as needed for cough 30 capsule 0      butalbital-acetaminophen-caffeine (FIORICET, ESGIC) -40 MG per tablet Take 1-2 tablets by mouth 4 times daily as needed   Taking     clonazePAM (KLONOPIN) 0.5 MG tablet Take 0.5 mg by mouth 3 times daily as needed for anxiety (increased to tid prn for next weeks)    Taking     EPINEPHrine (EPIPEN/ADRENACLICK/OR ANY BX GENERIC EQUIV) 0.3 MG/0.3ML injection 2-pack Inject 0.3 mLs (0.3 mg) into the muscle as needed for anaphylaxis 0.6 mL 1 Taking     estradiol (ESTRACE) 1 MG tablet Take 1 tablet (1 mg) by mouth daily 90 tablet 3 Taking     fluticasone (FLONASE) 50 MCG/ACT spray Spray 1-2 sprays into both nostrils daily as needed for rhinitis or allergies 1 Bottle 10 Taking     fluticasone (FLOVENT HFA) 110 MCG/ACT inhaler Inhale 1 puff into the lungs 2 times daily 12 g 1      hydrOXYzine (ATARAX) 50 MG tablet Take 2 tablets (100 mg) by mouth every 6 hours as needed for anxiety 180 tablet 5 Taking     hyoscyamine (ANASPAZ,LEVSIN) 0.125 MG tablet Take 1-2 tablets (125-250 mcg) by mouth every 4 hours as needed for cramping 40 tablet 9 Taking     meloxicam (MOBIC) 15 MG tablet Take 1 tablet (15 mg) by mouth daily 30 tablet 1 Taking     naloxone (NARCAN) 0.4 MG/ML injection Inject 0.25-1 mLs (0.1-0.4  "mg) into the vein once for 1 dose 1 mL 3      ondansetron (ZOFRAN ODT) 4 MG ODT tab Take 1 tablet (4 mg) by mouth every 6 hours as needed for nausea or vomiting 20 tablet 1 Taking     pregabalin (LYRICA) 100 MG capsule Take 1 capsule (100 mg) by mouth daily And 2 capsules (200 mg) at bedtime (Patient taking differently: Take 100 mg by mouth as needed And 2 capsules (200 mg) at bedtime) 270 capsule 1 Taking     senna-docusate (SENOKOT-S;PERICOLACE) 8.6-50 MG per tablet Take 2 tablets by mouth 2 times daily 120 tablet 5 Taking     traZODone (DESYREL) 150 MG tablet take 1 tablet by mouth daily  at bedtime 30 tablet 3 Taking     traZODone (DESYREL) 50 MG tablet Take 1 tablet (50mg) by mouth nightly with 150 mg tablets as needed for sleep for a total of 200mg. 90 tablet 1 Taking       Scheduled Medications:    sodium chloride 0.9%  500 mL Intravenous Once     sodium chloride (PF)  3 mL Intracatheter Q8H       PRN:  lidocaine 4%, lidocaine (buffered or not buffered), ondansetron, ondansetron, sodium chloride (PF), sodium chloride (PF)    PHYSICAL EXAM:   BP 99/74   Pulse 65   Resp 11   Ht 1.549 m (5' 1\")   Wt 74.8 kg (165 lb)   SpO2 90%   BMI 31.18 kg/m   Estimated body mass index is 31.18 kg/m  as calculated from the following:    Height as of this encounter: 1.549 m (5' 1\").    Weight as of this encounter: 74.8 kg (165 lb).   RESP: lungs clear to auscultation - no rales, rhonchi or wheezes  CV: regular rates and rhythm    IMPRESSION   ASA Class 2 - Mild systemic disease      Signed Electronically by: Kyler Alfredo MD  May 13, 2019    .            "

## 2019-05-13 NOTE — TELEPHONE ENCOUNTER
Zoloft  Sent 4/11/19 with 2 month supply. Refill not appropriate at this time.     Next 5 appointments (look out 90 days)    May 15, 2019 10:20 AM CDT  PHYSICAL with Halle De Los Santos MD  [facility] ([facility] [address] [place] [id number] [phone]        Amna Cavanaugh RN, BSN

## 2019-05-14 LAB — COPATH REPORT: NORMAL

## 2019-05-15 ENCOUNTER — OFFICE VISIT (OUTPATIENT)
Dept: PEDIATRICS | Facility: CLINIC | Age: 54
End: 2019-05-15
Payer: MEDICARE

## 2019-05-15 VITALS
HEART RATE: 76 BPM | BODY MASS INDEX: 32.51 KG/M2 | TEMPERATURE: 98.2 F | OXYGEN SATURATION: 99 % | DIASTOLIC BLOOD PRESSURE: 64 MMHG | SYSTOLIC BLOOD PRESSURE: 98 MMHG | WEIGHT: 165.6 LBS | HEIGHT: 60 IN

## 2019-05-15 DIAGNOSIS — D22.9 ATYPICAL MOLE: ICD-10-CM

## 2019-05-15 DIAGNOSIS — K59.09 OTHER CONSTIPATION: ICD-10-CM

## 2019-05-15 DIAGNOSIS — Z00.00 ROUTINE HISTORY AND PHYSICAL EXAMINATION OF ADULT: Primary | ICD-10-CM

## 2019-05-15 DIAGNOSIS — R25.2 MUSCLE CRAMPS: ICD-10-CM

## 2019-05-15 DIAGNOSIS — M25.50 MULTIPLE JOINT PAIN: ICD-10-CM

## 2019-05-15 DIAGNOSIS — F41.9 ANXIETY: ICD-10-CM

## 2019-05-15 DIAGNOSIS — H91.93 BILATERAL HEARING LOSS, UNSPECIFIED HEARING LOSS TYPE: ICD-10-CM

## 2019-05-15 DIAGNOSIS — R53.83 FATIGUE, UNSPECIFIED TYPE: ICD-10-CM

## 2019-05-15 DIAGNOSIS — H65.93 FLUID LEVEL BEHIND TYMPANIC MEMBRANE OF BOTH EARS: ICD-10-CM

## 2019-05-15 DIAGNOSIS — R09.82 PND (POST-NASAL DRIP): ICD-10-CM

## 2019-05-15 DIAGNOSIS — E78.5 HYPERLIPIDEMIA LDL GOAL <130: ICD-10-CM

## 2019-05-15 DIAGNOSIS — R05.9 COUGH: ICD-10-CM

## 2019-05-15 DIAGNOSIS — E53.8 VITAMIN B12 DEFICIENCY (NON ANEMIC): ICD-10-CM

## 2019-05-15 DIAGNOSIS — F32.1 MODERATE MAJOR DEPRESSION (H): ICD-10-CM

## 2019-05-15 DIAGNOSIS — M54.9 UPPER BACK PAIN: ICD-10-CM

## 2019-05-15 LAB
BASOPHILS # BLD AUTO: 0 10E9/L (ref 0–0.2)
BASOPHILS NFR BLD AUTO: 0.4 %
DIFFERENTIAL METHOD BLD: NORMAL
EOSINOPHIL # BLD AUTO: 0.2 10E9/L (ref 0–0.7)
EOSINOPHIL NFR BLD AUTO: 3.2 %
ERYTHROCYTE [DISTWIDTH] IN BLOOD BY AUTOMATED COUNT: 13.5 % (ref 10–15)
ERYTHROCYTE [SEDIMENTATION RATE] IN BLOOD BY WESTERGREN METHOD: 5 MM/H (ref 0–30)
HCT VFR BLD AUTO: 43.5 % (ref 35–47)
HGB BLD-MCNC: 14.2 G/DL (ref 11.7–15.7)
LYMPHOCYTES # BLD AUTO: 2.1 10E9/L (ref 0.8–5.3)
LYMPHOCYTES NFR BLD AUTO: 41.7 %
MCH RBC QN AUTO: 29 PG (ref 26.5–33)
MCHC RBC AUTO-ENTMCNC: 32.6 G/DL (ref 31.5–36.5)
MCV RBC AUTO: 89 FL (ref 78–100)
MONOCYTES # BLD AUTO: 0.7 10E9/L (ref 0–1.3)
MONOCYTES NFR BLD AUTO: 13 %
NEUTROPHILS # BLD AUTO: 2.1 10E9/L (ref 1.6–8.3)
NEUTROPHILS NFR BLD AUTO: 41.7 %
PLATELET # BLD AUTO: 198 10E9/L (ref 150–450)
RBC # BLD AUTO: 4.89 10E12/L (ref 3.8–5.2)
VIT B12 SERPL-MCNC: 801 PG/ML (ref 193–986)
WBC # BLD AUTO: 5 10E9/L (ref 4–11)

## 2019-05-15 PROCEDURE — 86431 RHEUMATOID FACTOR QUANT: CPT | Performed by: INTERNAL MEDICINE

## 2019-05-15 PROCEDURE — 80053 COMPREHEN METABOLIC PANEL: CPT | Performed by: INTERNAL MEDICINE

## 2019-05-15 PROCEDURE — 99214 OFFICE O/P EST MOD 30 MIN: CPT | Mod: 25 | Performed by: INTERNAL MEDICINE

## 2019-05-15 PROCEDURE — 36415 COLL VENOUS BLD VENIPUNCTURE: CPT | Performed by: INTERNAL MEDICINE

## 2019-05-15 PROCEDURE — 84443 ASSAY THYROID STIM HORMONE: CPT | Performed by: INTERNAL MEDICINE

## 2019-05-15 PROCEDURE — 82550 ASSAY OF CK (CPK): CPT | Performed by: INTERNAL MEDICINE

## 2019-05-15 PROCEDURE — 86200 CCP ANTIBODY: CPT | Performed by: INTERNAL MEDICINE

## 2019-05-15 PROCEDURE — 82607 VITAMIN B-12: CPT | Performed by: INTERNAL MEDICINE

## 2019-05-15 PROCEDURE — G0438 PPPS, INITIAL VISIT: HCPCS | Performed by: INTERNAL MEDICINE

## 2019-05-15 PROCEDURE — 80061 LIPID PANEL: CPT | Performed by: INTERNAL MEDICINE

## 2019-05-15 PROCEDURE — 85025 COMPLETE CBC W/AUTO DIFF WBC: CPT | Performed by: INTERNAL MEDICINE

## 2019-05-15 PROCEDURE — 86038 ANTINUCLEAR ANTIBODIES: CPT | Performed by: INTERNAL MEDICINE

## 2019-05-15 PROCEDURE — 85652 RBC SED RATE AUTOMATED: CPT | Performed by: INTERNAL MEDICINE

## 2019-05-15 RX ORDER — ATORVASTATIN CALCIUM 20 MG/1
20 TABLET, FILM COATED ORAL AT BEDTIME
Qty: 90 TABLET | Refills: 3 | Status: SHIPPED | OUTPATIENT
Start: 2019-05-15

## 2019-05-15 RX ORDER — MULTIVIT-MIN/IRON/FOLIC ACID/K 18-600-40
CAPSULE ORAL
COMMUNITY

## 2019-05-15 RX ORDER — HYDROMORPHONE HYDROCHLORIDE 4 MG/1
4 TABLET ORAL EVERY 6 HOURS PRN
COMMUNITY

## 2019-05-15 RX ORDER — SERTRALINE HYDROCHLORIDE 100 MG/1
100 TABLET, FILM COATED ORAL DAILY
Qty: 90 TABLET | Refills: 1 | Status: SHIPPED | OUTPATIENT
Start: 2019-05-15 | End: 2019-12-05

## 2019-05-15 RX ORDER — AMOXICILLIN 250 MG
2 CAPSULE ORAL 2 TIMES DAILY
Qty: 360 TABLET | Refills: 5 | Status: SHIPPED | OUTPATIENT
Start: 2019-05-15 | End: 2020-05-18

## 2019-05-15 ASSESSMENT — MIFFLIN-ST. JEOR: SCORE: 1283.28

## 2019-05-15 ASSESSMENT — ENCOUNTER SYMPTOMS
DIZZINESS: 1
MYALGIAS: 1
COUGH: 1
SHORTNESS OF BREATH: 1
JOINT SWELLING: 1
WEAKNESS: 1
HEADACHES: 1
ARTHRALGIAS: 1
BREAST MASS: 1
NAUSEA: 1
CHILLS: 1
NERVOUS/ANXIOUS: 1

## 2019-05-15 ASSESSMENT — ACTIVITIES OF DAILY LIVING (ADL)
CURRENT_FUNCTION: HOUSEWORK REQUIRES ASSISTANCE
CURRENT_FUNCTION: TRANSPORTATION REQUIRES ASSISTANCE

## 2019-05-15 ASSESSMENT — PATIENT HEALTH QUESTIONNAIRE - PHQ9
SUM OF ALL RESPONSES TO PHQ QUESTIONS 1-9: 17
10. IF YOU CHECKED OFF ANY PROBLEMS, HOW DIFFICULT HAVE THESE PROBLEMS MADE IT FOR YOU TO DO YOUR WORK, TAKE CARE OF THINGS AT HOME, OR GET ALONG WITH OTHER PEOPLE: VERY DIFFICULT
SUM OF ALL RESPONSES TO PHQ QUESTIONS 1-9: 17
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
## 2019-05-15 NOTE — PATIENT INSTRUCTIONS
Preventive Health Recommendations  Female Ages 50 - 64    Yearly exam: See your health care provider every year in order to  o Review health changes.   o Discuss preventive care.    o Review your medicines if your doctor has prescribed any.      Get a Pap test every three years (unless you have an abnormal result and your provider advises testing more often).    If you get Pap tests with HPV test, you only need to test every 5 years, unless you have an abnormal result.     You do not need a Pap test if your uterus was removed (hysterectomy) and you have not had cancer.    You should be tested each year for STDs (sexually transmitted diseases) if you're at risk.     Have a mammogram every 1 to 2 years.    Have a colonoscopy at age 50, or have a yearly FIT test (stool test). These exams screen for colon cancer.      Have a cholesterol test every 5 years, or more often if advised.    Have a diabetes test (fasting glucose) every three years. If you are at risk for diabetes, you should have this test more often.     If you are at risk for osteoporosis (brittle bone disease), think about having a bone density scan (DEXA).    Shots: Get a flu shot each year. Get a tetanus shot every 10 years.    Nutrition:     Eat at least 5 servings of fruits and vegetables each day.    Eat whole-grain bread, whole-wheat pasta and brown rice instead of white grains and rice.    Get adequate Calcium and Vitamin D.     Lifestyle    Exercise at least 150 minutes a week (30 minutes a day, 5 days a week). This will help you control your weight and prevent disease.    Limit alcohol to one drink per day.    No smoking.     Wear sunscreen to prevent skin cancer.     See your dentist every six months for an exam and cleaning.    See your eye doctor every 1 to 2 years.    ------------------  1. Check to see if Dermatology covered with insurance  2. Increase sertraline to 100 mg once a day - can take 2 of what you have for now, then 1 with the next  rx  3. Refilled atorvastatin to have on file and refilled senna-docusate  4. Schedule mammogram - [phone]  5. Labs today: cholesterol, liver and kidney function, electrolytes, thyroid function, blood counts, vitamin B12, rheumatoid arthritis testing (rheumatoid factor and CCP antibody), sed rate (measure of inflammation), CK (muscle test) and RIK (autoimmune screening)  6. Start flonase for middle ear fluid and cough - once a day  - if hearing not improving in 2-3 weeks, will have see ENT  7. Can call to schedule appointment with plastic surgeon to discuss breast reduction  8. If labs ok, could try stopping lipitor for a couple of weeks and see if muscle pains get better  9. Follow-up in about a month to recheck depression

## 2019-05-16 LAB
ANA SER QL IF: NEGATIVE
CCP AB SER IA-ACNC: 1 U/ML
RHEUMATOID FACT SER NEPH-ACNC: <20 IU/ML (ref 0–20)

## 2019-05-16 ASSESSMENT — ASTHMA QUESTIONNAIRES: ACT_TOTALSCORE: 18

## 2019-05-16 ASSESSMENT — PATIENT HEALTH QUESTIONNAIRE - PHQ9: SUM OF ALL RESPONSES TO PHQ QUESTIONS 1-9: 17

## 2019-05-17 LAB
ALBUMIN SERPL-MCNC: 3.9 G/DL (ref 3.4–5)
ALP SERPL-CCNC: 92 U/L (ref 40–150)
ALT SERPL W P-5'-P-CCNC: 31 U/L (ref 0–50)
ANION GAP SERPL CALCULATED.3IONS-SCNC: 7 MMOL/L (ref 3–14)
AST SERPL W P-5'-P-CCNC: 28 U/L (ref 0–45)
BILIRUB SERPL-MCNC: 0.5 MG/DL (ref 0.2–1.3)
BUN SERPL-MCNC: 11 MG/DL (ref 7–30)
CALCIUM SERPL-MCNC: 8.8 MG/DL (ref 8.5–10.1)
CHLORIDE SERPL-SCNC: 102 MMOL/L (ref 94–109)
CHOLEST SERPL-MCNC: 173 MG/DL
CK SERPL-CCNC: 63 U/L (ref 30–225)
CO2 SERPL-SCNC: 29 MMOL/L (ref 20–32)
CREAT SERPL-MCNC: 1.23 MG/DL (ref 0.52–1.04)
GFR SERPL CREATININE-BSD FRML MDRD: 50 ML/MIN/{1.73_M2}
GLUCOSE SERPL-MCNC: 93 MG/DL (ref 70–99)
HDLC SERPL-MCNC: 54 MG/DL
LDLC SERPL CALC-MCNC: 84 MG/DL
NONHDLC SERPL-MCNC: 119 MG/DL
POTASSIUM SERPL-SCNC: 4 MMOL/L (ref 3.4–5.3)
PROT SERPL-MCNC: 7.4 G/DL (ref 6.8–8.8)
SODIUM SERPL-SCNC: 138 MMOL/L (ref 133–144)
TRIGL SERPL-MCNC: 174 MG/DL
TSH SERPL DL<=0.005 MIU/L-ACNC: 0.69 MU/L (ref 0.4–4)

## 2019-05-26 DIAGNOSIS — K59.03 DRUG-INDUCED CONSTIPATION: ICD-10-CM

## 2019-05-26 DIAGNOSIS — K59.09 CHRONIC CONSTIPATION: ICD-10-CM

## 2019-05-26 DIAGNOSIS — G89.4 CHRONIC PAIN SYNDROME: ICD-10-CM

## 2019-05-26 NOTE — TELEPHONE ENCOUNTER
Requested Prescriptions   Pending Prescriptions Disp Refills     MOVANTIK 25 MG TABS tablet [Pharmacy Med Name: MOVANTIK 25 MG TABLET] 30 tablet 4     Sig: TAKE 1 TABLET BY MOUTH EVERY DAY IN THE MORNING BEFORE BREAKFAST       There is no refill protocol information for this order        Last Written Prescription Date:  04/24/2018  Last Fill Quantity: 30 tablet,  # refills: 11    Last office visit: 5/15/2019 with prescribing provider:  Halle De Los Santos MD       Future Office Visit:      Routing refill request to provider for review/approval because:  Drug not on the [facility], [facility] or [facility] refill protocol or controlled substance

## 2019-05-28 RX ORDER — NALOXEGOL OXALATE 25 MG/1
TABLET, FILM COATED ORAL
Qty: 30 TABLET | Refills: 4 | Status: SHIPPED | OUTPATIENT
Start: 2019-05-28

## 2019-06-09 DIAGNOSIS — F51.01 PRIMARY INSOMNIA: ICD-10-CM

## 2019-06-09 NOTE — TELEPHONE ENCOUNTER
"Requested Prescriptions   Pending Prescriptions Disp Refills     traZODone (DESYREL) 50 MG tablet [Pharmacy Med Name: TRAZODONE 50 MG TABLET] 90 tablet 1     Sig: TAKE 1 TABLET (50MG) BY MOUTH NIGHTLY WITH 150 MG TABLETS AS NEEDED FOR SLEEP FOR A TOTAL OF 200MG.  Last Written Prescription Date:  12/12/2018  Last Fill Quantity: 90 tablet,  # refills: 1    Last office visit: 5/15/2019 with prescribing provider:  Halle De Los Santos MD        Future Office Visit:           Serotonin Modulators Passed - 6/9/2019  9:34 AM        Passed - Recent (12 mo) or future (30 days) visit within the authorizing provider's specialty     Patient had office visit in the last 12 months or has a visit in the next 30 days with authorizing provider or within the authorizing provider's specialty.  See \"Patient Info\" tab in inbasket, or \"Choose Columns\" in Meds & Orders section of the refill encounter.              Passed - Medication is active on med list        Passed - Patient is age 18 or older        Passed - No active pregnancy on record        Passed - No positive pregnancy test in past 12 months          "

## 2019-06-11 RX ORDER — TRAZODONE HYDROCHLORIDE 50 MG/1
TABLET, FILM COATED ORAL
Qty: 90 TABLET | Refills: 2 | Status: SHIPPED | OUTPATIENT
Start: 2019-06-11

## 2019-07-02 NOTE — ADDENDUM NOTE
Addended by: ART CHAVEZ on: 11/13/2018 03:00 PM     Modules accepted: Orders    
Addended by: SANDRA GASTON on: 11/13/2018 11:44 AM     Modules accepted: Orders    
02-Jul-2019 07:05
28-Jun-2019 00:00

## 2019-07-23 ENCOUNTER — TRANSFERRED RECORDS (OUTPATIENT)
Dept: HEALTH INFORMATION MANAGEMENT | Facility: CLINIC | Age: 54
End: 2019-07-23

## 2019-08-23 ENCOUNTER — OFFICE VISIT (OUTPATIENT)
Dept: PEDIATRICS | Facility: CLINIC | Age: 54
End: 2019-08-23
Payer: MEDICARE

## 2019-08-23 VITALS
TEMPERATURE: 97.6 F | HEIGHT: 60 IN | BODY MASS INDEX: 30.33 KG/M2 | DIASTOLIC BLOOD PRESSURE: 56 MMHG | HEART RATE: 65 BPM | WEIGHT: 154.5 LBS | SYSTOLIC BLOOD PRESSURE: 92 MMHG | OXYGEN SATURATION: 98 %

## 2019-08-23 DIAGNOSIS — H92.03 OTALGIA OF BOTH EARS: ICD-10-CM

## 2019-08-23 DIAGNOSIS — N63.11 MASS OF UPPER OUTER QUADRANT OF RIGHT BREAST: ICD-10-CM

## 2019-08-23 DIAGNOSIS — F41.9 ANXIETY: ICD-10-CM

## 2019-08-23 DIAGNOSIS — G43.109 MIGRAINE WITH AURA AND WITHOUT STATUS MIGRAINOSUS, NOT INTRACTABLE: ICD-10-CM

## 2019-08-23 DIAGNOSIS — L30.9 DERMATITIS: Primary | ICD-10-CM

## 2019-08-23 DIAGNOSIS — F32.1 MODERATE MAJOR DEPRESSION (H): ICD-10-CM

## 2019-08-23 DIAGNOSIS — N63.21 MASS OF UPPER OUTER QUADRANT OF LEFT BREAST: ICD-10-CM

## 2019-08-23 DIAGNOSIS — L20.82 FLEXURAL ECZEMA: ICD-10-CM

## 2019-08-23 PROCEDURE — 99215 OFFICE O/P EST HI 40 MIN: CPT | Performed by: INTERNAL MEDICINE

## 2019-08-23 RX ORDER — BUTALBITAL, ACETAMINOPHEN AND CAFFEINE 50; 325; 40 MG/1; MG/1; MG/1
1-2 TABLET ORAL 4 TIMES DAILY PRN
Qty: 20 TABLET | Refills: 0 | Status: SHIPPED | OUTPATIENT
Start: 2019-08-23

## 2019-08-23 RX ORDER — HYDROXYZINE HYDROCHLORIDE 50 MG/1
100 TABLET, FILM COATED ORAL EVERY 6 HOURS PRN
Qty: 180 TABLET | Refills: 5 | Status: SHIPPED | OUTPATIENT
Start: 2019-08-23 | End: 2020-02-20

## 2019-08-23 RX ORDER — TRIAMCINOLONE ACETONIDE 1 MG/G
CREAM TOPICAL 2 TIMES DAILY PRN
Qty: 45 G | Refills: 1 | Status: SHIPPED | OUTPATIENT
Start: 2019-08-23 | End: 2020-05-27

## 2019-08-23 ASSESSMENT — PATIENT HEALTH QUESTIONNAIRE - PHQ9
SUM OF ALL RESPONSES TO PHQ QUESTIONS 1-9: 19
SUM OF ALL RESPONSES TO PHQ QUESTIONS 1-9: 19
10. IF YOU CHECKED OFF ANY PROBLEMS, HOW DIFFICULT HAVE THESE PROBLEMS MADE IT FOR YOU TO DO YOUR WORK, TAKE CARE OF THINGS AT HOME, OR GET ALONG WITH OTHER PEOPLE: VERY DIFFICULT

## 2019-08-23 ASSESSMENT — MIFFLIN-ST. JEOR: SCORE: 1232.31

## 2019-08-23 NOTE — PATIENT INSTRUCTIONS
1. Had whooping cough vaccine in 2016  2. Refilled fioricet  3. Try triamcinolone cream twice a day for really itchy areas and eczema  4. Use hydroxyzine for itching  5. You will be contacted to set up a diagnostic mammogram and ultrasound  6. You will be contacted to set up an appointment with a therapist  7. Could try sudafed for ear and see if helps with ear

## 2019-08-23 NOTE — PROGRESS NOTES
Subjective     Cristela Smith is a 54 year old female who presents to clinic today for the following health issues:    History of Present Illness        She eats 2-3 servings of fruits and vegetables daily.She consumes 2 sweetened beverage(s) daily.She is missing 3 dose(s) of medications per week.  She is not taking prescribed medications regularly due to remembering to take.     Rash      Duration: 6 days    Description  Location: all over  Itching: severe    Intensity:  moderate    Accompanying signs and symptoms: raised red bumps    History (similar episodes/previous evaluation): None    Precipitating or alleviating factors:  New exposures:  Possible bites from red ants  Recent travel: no      Therapies tried and outcome: Benadryl/diphenhydramine -  not effective and Atarax/hydroxyzine -  usually effective    Rash for past 6 days. Raised red bumps. Very itchy. Hydroxyzine helps with itching, but doesn't make rash go away. Had red ants crawling on her and she is allergic to them. No other possible exposures. No one else with rash.    Ear pain      Duration: since last OV    Description (location/character/radiation): pain in middle/outer ear    Intensity:  moderate    Accompanying signs and symptoms: none    History (similar episodes/previous evaluation): h/o fluid in ear    Precipitating or alleviating factors: None    Therapies tried and outcome: flonase, H2O2    Allergies have been bad this summer. Tried zyrtec and allegra, but did not help. Tried flonase for ear pain and hydrogen peroxide and did not help either. Has had fluid in ear in the past. Worse on the left.     Lump in both breasts - had noticed, then lost the area and then found again 2 weeks ago. Last mammo 5/2018.      Anxiety/depression: having a harder time again. Thinks she needs to find a new therapist. Had stopped seeing her old therapist because she had started treating her more of a friend than as a therapist and was not helpful any longer.  Continues on sertraline. Doesn't want to make med changes right now.    Migraines: uses fioricet occasionally when migraines will not respond to anything else. Needs a refill. Has not had filled in last year.     Reviewed and updated as needed this visit by Provider  Tobacco  Allergies  Meds  Problems  Med Hx  Surg Hx  Fam Hx       Review of Systems   ROS COMP: Constitutional, HEENT, cardiovascular, pulmonary, GI, , musculoskeletal, neuro, skin, endocrine and psych systems are negative, except as otherwise noted.      Objective    BP 92/56 (BP Location: Right arm, Patient Position: Sitting, Cuff Size: Adult Regular)   Pulse 65   Temp 97.6  F (36.4  C) (Tympanic)   Ht 1.524 m (5')   Wt 70.1 kg (154 lb 8 oz)   SpO2 98%   BMI 30.17 kg/m    Body mass index is 30.17 kg/m .  Physical Exam   GENERAL: healthy, alert and no distress  EYES: Eyes grossly normal to inspection, PERRL and conjunctivae and sclerae normal  HENT: ear canals and TM's normal, nose and mouth without ulcers or lesions  RESP: lungs clear to auscultation - no rales, rhonchi or wheezes  BREAST: normal without masses, tenderness or nipple discharge and no palpable axillary masses or adenopathy  CV: regular rate and rhythm, normal S1 S2, no S3 or S4, no murmur, click or rub, no peripheral edema and peripheral pulses strong  SKIN: scattered 1-3 mm erythematous papules on arms and upper chest. A few on legs. Left elbow with eczematous patch.    Diagnostic Test Results:  none         Assessment & Plan     1. Dermatitis  Non-specific rash. Could certainly be from ant bites. Does not look like scabies or bed bugs.  Will try triamcinolone cream. Could try burst of oral steroid if not responding.  - triamcinolone (KENALOG) 0.1 % external cream; Apply topically 2 times daily as needed for irritation  Dispense: 45 g; Refill: 1    2. Moderate major depression (H)  Not fully controlled. Continue sertraline. Referral for new therapist.  - MENTAL HEALTH  REFERRAL  - Adult; Outpatient Treatment; Individual/Couples/Family/Group Therapy/Health Psychology; Other: [facility] [phone]; We will contact you to schedule the appointment or please call with any questi...    3. Anxiety  Not fully controlled. Continue sertraline. Hydroxyzine refilled. Referral to new therapist.  - hydrOXYzine (ATARAX) 50 MG tablet; Take 2 tablets (100 mg) by mouth every 6 hours as needed for itching or anxiety  Dispense: 180 tablet; Refill: 5  - MENTAL HEALTH REFERRAL  - Adult; Outpatient Treatment; Individual/Couples/Family/Group Therapy/Health Psychology; Other: [facility] [phone]; We will contact you to schedule the appointment or please call with any questi...    4. Migraine with aura and without status migrainosus, not intractable  Uses Fioricet infrequently. Reviewed  and not prescribed in last year.  - butalbital-acetaminophen-caffeine (FIORICET/ESGIC) -40 MG tablet; Take 1-2 tablets by mouth 4 times daily as needed for migraine  Dispense: 20 tablet; Refill: 0    5. Mass of upper outer quadrant of left breast  No specific mass palpated on exam. Will obtain diagnostic mammo.  - MA Diagnostic Digital Bilateral; Future  - US Breast Bilateral Complete 4 Quadrants; Future    6. Mass of upper outer quadrant of right breast  No specific mass palpated on exam. Will obtain a diagnostic mammo.  - MA Diagnostic Digital Bilateral; Future  - US Breast Bilateral Complete 4 Quadrants; Future    7. Flexural eczema  Will use triamcinolone cream.    8. Ear pain  Previously with fluid on exam. No fluid seen today. Suspect still having Eustachian tube dysfunction. Could try sudafed. If not responding, would recommend ENT       Tobacco Cessation:   reports that she has been smoking cigarettes.  She has a 20.00 pack-year smoking history. She has never used smokeless tobacco.  Tobacco Cessation Action Plan: Information offered: Patient not  interested at this time      BMI:   Estimated body mass index is 30.17 kg/m  as calculated from the following:    Height as of this encounter: 1.524 m (5').    Weight as of this encounter: 70.1 kg (154 lb 8 oz).   Weight management plan: Discussed healthy diet and exercise guidelines     A total of 45 minutes was spent in face-to-face contact with Cristela in clinic today, of which >50% was spent counseling or in coordination of care regarding dermatitis, depression, anxiety, migraine, bilateral breast lumps, eczema and pain in ears.      Return in about 2 weeks (around 9/6/2019), or if symptoms worsen or fail to improve.    Halle De Los Santos MD  [facility]    Answers for HPI/ROS submitted by the patient on 8/23/2019   Chronic problems general questions HPI Form  If you checked off any problems, how difficult have these problems made it for you to do your work, take care of things at home, or get along with other people?: Very difficult  PHQ9 TOTAL SCORE: 19

## 2019-08-24 ASSESSMENT — PATIENT HEALTH QUESTIONNAIRE - PHQ9: SUM OF ALL RESPONSES TO PHQ QUESTIONS 1-9: 19

## 2019-08-26 ENCOUNTER — OFFICE VISIT (OUTPATIENT)
Dept: PEDIATRICS | Facility: CLINIC | Age: 54
End: 2019-08-26
Payer: MEDICARE

## 2019-08-26 VITALS
OXYGEN SATURATION: 95 % | SYSTOLIC BLOOD PRESSURE: 76 MMHG | WEIGHT: 154.5 LBS | HEART RATE: 93 BPM | BODY MASS INDEX: 30.33 KG/M2 | DIASTOLIC BLOOD PRESSURE: 48 MMHG | TEMPERATURE: 98 F | HEIGHT: 60 IN

## 2019-08-26 DIAGNOSIS — R59.9 ENLARGED LYMPH NODES: Primary | ICD-10-CM

## 2019-08-26 DIAGNOSIS — L30.9 DERMATITIS: ICD-10-CM

## 2019-08-26 PROCEDURE — 99214 OFFICE O/P EST MOD 30 MIN: CPT | Performed by: INTERNAL MEDICINE

## 2019-08-26 RX ORDER — PREDNISONE 20 MG/1
40 TABLET ORAL DAILY
Qty: 10 TABLET | Refills: 0 | Status: SHIPPED | OUTPATIENT
Start: 2019-08-26 | End: 2019-08-31

## 2019-08-26 ASSESSMENT — MIFFLIN-ST. JEOR: SCORE: 1232.31

## 2019-08-26 NOTE — PROGRESS NOTES
Subjective     Cristela Smith is a 54 year old female who presents to clinic today for the following health issues:    HPI   Possible hernia      Duration: 5 days    Description (location/character/radiation): lump right groin    Intensity:  mild    Accompanying signs and symptoms: pain    History (similar episodes/previous evaluation): None    Precipitating or alleviating factors: lifting    Therapies tried and outcome: None    Noticed 5 days ago. Concerned she might have a hernia in right groin. Noticed a lump in that area. Is a little painful. Also seen recently for all over rash, but worse in right leg/arm. Concerned rash due to red ants. Tried triamcinolone cream and not improving. Not getting new lesions.     Reviewed and updated as needed this visit by Provider  Tobacco  Allergies  Meds  Problems  Med Hx  Surg Hx  Fam Hx       Review of Systems   ROS COMP: Constitutional, HEENT, cardiovascular, pulmonary, gi and gu systems are negative, except as otherwise noted.      Objective    BP (!) 76/48 (BP Location: Right arm, Patient Position: Sitting, Cuff Size: Adult Regular)   Pulse 93   Temp 98  F (36.7  C) (Tympanic)   Ht 1.524 m (5')   Wt 70.1 kg (154 lb 8 oz)   SpO2 95%   BMI 30.17 kg/m    Body mass index is 30.17 kg/m .  Physical Exam   GENERAL: healthy, alert and no distress  EYES: Eyes grossly normal to inspection, PERRL and conjunctivae and sclerae normal  LYMPH: right groin with a little over 1 cm, tender, mobile lymph node. A few other smaller nodes palpated. No hernia    Diagnostic Test Results:  none         Assessment & Plan     1. Enlarged lymph nodes  Reassurance given. No hernia. Enlarged lymph node likely due to rash. Will continue to monitor. F/u if not improving in 2 weeks and would consider lab work.    2. Dermatitis  Not improving. Will try oral burst of prednisone. If does not respond and not improving, will have see Derm.  - predniSONE (DELTASONE) 20 MG tablet; Take 2 tablets  (40 mg) by mouth daily for 5 days  Dispense: 10 tablet; Refill: 0     Tobacco Cessation:   reports that she has been smoking cigarettes.  She has a 20.00 pack-year smoking history. She has never used smokeless tobacco.  Tobacco Cessation Action Plan: Information offered: Patient not interested at this time      BMI:   Estimated body mass index is 30.17 kg/m  as calculated from the following:    Height as of this encounter: 1.524 m (5').    Weight as of this encounter: 70.1 kg (154 lb 8 oz).   Weight management plan: Discussed healthy diet and exercise guidelines    Return in about 2 weeks (around 9/9/2019), or if symptoms worsen or fail to improve.    Halle De Los Santos MD  [facility]

## 2019-08-26 NOTE — PATIENT INSTRUCTIONS
1. Feels like swollen lymph nodes  2. Could try heating pad to help with pain  3. Will try prednisone to help the rash go away faster  - 40 mg once a day for 5 days  4. Lymph nodes should gradually improve over next 2 weeks, but follow-up if not improving

## 2019-09-04 ENCOUNTER — HOSPITAL ENCOUNTER (OUTPATIENT)
Dept: ULTRASOUND IMAGING | Facility: CLINIC | Age: 54
End: 2019-09-04
Attending: INTERNAL MEDICINE
Payer: MEDICARE

## 2019-09-04 ENCOUNTER — HOSPITAL ENCOUNTER (OUTPATIENT)
Dept: MAMMOGRAPHY | Facility: CLINIC | Age: 54
Discharge: HOME | End: 2019-09-04
Attending: INTERNAL MEDICINE | Admitting: INTERNAL MEDICINE
Payer: MEDICARE

## 2019-09-04 DIAGNOSIS — N63.11 MASS OF UPPER OUTER QUADRANT OF RIGHT BREAST: ICD-10-CM

## 2019-09-04 DIAGNOSIS — N63.21 MASS OF UPPER OUTER QUADRANT OF LEFT BREAST: ICD-10-CM

## 2019-09-04 PROCEDURE — 77066 DX MAMMO INCL CAD BI: CPT

## 2019-09-04 PROCEDURE — G0279 TOMOSYNTHESIS, MAMMO: HCPCS

## 2019-09-04 PROCEDURE — 76642 ULTRASOUND BREAST LIMITED: CPT | Mod: 50

## 2019-09-09 ENCOUNTER — TELEPHONE (OUTPATIENT)
Dept: PEDIATRICS | Facility: CLINIC | Age: 54
End: 2019-09-09

## 2019-09-09 NOTE — TELEPHONE ENCOUNTER
Reason for call:  Symptom   Symptom or request: body aches, coughing, no voice, enlarged lymph nodes per mammogram tech, fever 100-101.8     Duration (how long have symptoms been present): 3 week    Have you been treated for this before? Yes, was on prednisone    Additional comments: has been taking cold/flu medication over the weekend.     Phone number to reach patient:  Home number on file [phone] (home)    Best Time:  any    Can we leave a detailed message on this number?  YES

## 2019-09-09 NOTE — TELEPHONE ENCOUNTER
Called the pt back.  It started 3 weeks ago.  She had a rash and bites.  She got some cream - steroid.  Her armpits were hurting and she felt crappy.  She was then on prednisone for 5 days.  Last Saturday was her last day on prednisone.  [place]  She was extremely exhausted.  It hurt to move her arms and legs and sit in different positions.  She started feeling worse.      It hurts to sit.  It hurts to move her arms and legs.  She took some tessalon pearls from an old prescription.  She has zero energy.  She has been sleeping.  She is super weak.  She has a fever from 99.9-102.2.  She is not eating.  She said she has been using her inhaler.       Advised to be seen tonight in .  Times and locations given.

## 2019-09-09 NOTE — TELEPHONE ENCOUNTER
Called patient. She wanted to see what she can do for her symptoms.   Reviewed likely a virus. Reassured and gave home care advice.   Patient will go to  or come in for an office visit if symptoms change or worsen.     FYI, these symptoms started Friday and are separate from her 8/26/19 office visit.     Patient also started Counseling.

## 2019-10-28 DIAGNOSIS — N95.1 MENOPAUSAL SYNDROME (HOT FLASHES): ICD-10-CM

## 2019-10-28 RX ORDER — ESTRADIOL 1 MG/1
TABLET ORAL
Qty: 90 TABLET | Refills: 2 | Status: SHIPPED | OUTPATIENT
Start: 2019-10-28

## 2019-10-28 NOTE — TELEPHONE ENCOUNTER
Last physical 5/15/19.  Prescription approved per [facility] Refill Protocol.  Camryn Diop RN  Message handled by Nurse Triage.

## 2019-10-28 NOTE — TELEPHONE ENCOUNTER
"Requested Prescriptions   Pending Prescriptions Disp Refills     estradiol (ESTRACE) 1 MG tablet [Pharmacy Med Name: ESTRADIOL 1 MG TABLET]    Last Written Prescription Date:  11/13/2018  Last Fill Quantity: 90,  # refills: 3   Last office visit: 8/26/2019 with prescribing provider:  Halle De Los Santos     Future Office Visit:     90 tablet 3     Sig: TAKE 1 TABLET BY MOUTH EVERY DAY       Hormone Replacement Therapy Passed - 10/28/2019  1:50 AM        Passed - Blood pressure under 140/90 in past 12 months     BP Readings from Last 3 Encounters:   08/26/19 (!) 76/48   08/23/19 92/56   05/15/19 98/64                 Passed - Recent (12 mo) or future (30 days) visit within the authorizing provider's specialty     Patient has had an office visit with the authorizing provider or a provider within the authorizing providers department within the previous 12 mos or has a future within next 30 days. See \"Patient Info\" tab in inbasket, or \"Choose Columns\" in Meds & Orders section of the refill encounter.              Passed - Patient has mammogram in past 2 years on file if age 50-75        Passed - Medication is active on med list        Passed - Patient is 18 years of age or older        Passed - No active pregnancy on record        Passed - No positive pregnancy test on record in past 12 months          "

## 2019-12-05 DIAGNOSIS — F41.9 ANXIETY: ICD-10-CM

## 2019-12-05 DIAGNOSIS — F32.1 MODERATE MAJOR DEPRESSION (H): ICD-10-CM

## 2019-12-06 RX ORDER — SERTRALINE HYDROCHLORIDE 100 MG/1
TABLET, FILM COATED ORAL
Qty: 90 TABLET | Refills: 1 | Status: SHIPPED | OUTPATIENT
Start: 2019-12-06

## 2019-12-06 NOTE — TELEPHONE ENCOUNTER
"Failed phq9 protocol    Last Written Prescription Date:  5/15/19  Last Fill Quantity: 90,  # refills: 1   Last office visit: 8/26/2019 with prescribing provider:     Future Office Visit:    Requested Prescriptions   Pending Prescriptions Disp Refills     sertraline (ZOLOFT) 100 MG tablet [Pharmacy Med Name: SERTRALINE  MG TABLET] 90 tablet 1     Sig: TAKE 1 TABLET BY MOUTH EVERY DAY       SSRIs Protocol Failed - 12/5/2019  2:27 AM        Failed - PHQ-9 score less than 5 in past 6 months  PHQ-9 SCORE 2/7/2019 5/15/2019 8/23/2019   PHQ-9 Total Score - - -   PHQ-9 Total Score MyChart - 17 (Moderately severe depression) 19 (Moderately severe depression)   PHQ-9 Total Score 12 17 19        Please review last PHQ-9 score.           Passed - Medication is active on med list        Passed - Patient is age 18 or older        Passed - No active pregnancy on record        Passed - No positive pregnancy test in last 12 months        Passed - Recent (6 mo) or future (30 days) visit within the authorizing provider's specialty     Patient had office visit in the last 6 months or has a visit in the next 30 days with authorizing provider or within the authorizing provider's specialty.  See \"Patient Info\" tab in inbasket, or \"Choose Columns\" in Meds & Orders section of the refill encounter.              "

## 2020-01-15 ENCOUNTER — TELEPHONE (OUTPATIENT)
Dept: PEDIATRICS | Facility: CLINIC | Age: 55
End: 2020-01-15

## 2020-01-15 NOTE — TELEPHONE ENCOUNTER
Returned pt's call regarding urinary symptoms    Pt reports the following symptoms since last Wednesday 1/8  -increased urination  -slight vaginal itching  -urinary urgency  -pelvic pressure    Pt has CRP syndrome so she states she is unable to tell if she has fever.    Pt declined an evisit  Appt made for 1/16 with ELISABETH at 11am    Pt verbalized understanding and agrees to the plan.    Thank you, Barb MELLO

## 2020-01-16 ENCOUNTER — OFFICE VISIT (OUTPATIENT)
Dept: PEDIATRICS | Facility: CLINIC | Age: 55
End: 2020-01-16
Payer: MEDICARE

## 2020-01-16 VITALS
BODY MASS INDEX: 29.25 KG/M2 | RESPIRATION RATE: 16 BRPM | HEART RATE: 66 BPM | SYSTOLIC BLOOD PRESSURE: 96 MMHG | OXYGEN SATURATION: 97 % | WEIGHT: 149 LBS | HEIGHT: 60 IN | DIASTOLIC BLOOD PRESSURE: 58 MMHG | TEMPERATURE: 98.5 F

## 2020-01-16 DIAGNOSIS — R30.9 PAINFUL URINATION: ICD-10-CM

## 2020-01-16 DIAGNOSIS — N76.0 VAGINITIS AND VULVOVAGINITIS: Primary | ICD-10-CM

## 2020-01-16 DIAGNOSIS — N89.8 VAGINAL ITCHING: ICD-10-CM

## 2020-01-16 LAB
ALBUMIN UR-MCNC: NEGATIVE MG/DL
APPEARANCE UR: CLEAR
BACTERIA #/AREA URNS HPF: ABNORMAL /HPF
BILIRUB UR QL STRIP: NEGATIVE
COLOR UR AUTO: YELLOW
GLUCOSE UR STRIP-MCNC: NEGATIVE MG/DL
HGB UR QL STRIP: NEGATIVE
KETONES UR STRIP-MCNC: NEGATIVE MG/DL
LEUKOCYTE ESTERASE UR QL STRIP: NEGATIVE
NITRATE UR QL: NEGATIVE
NON-SQ EPI CELLS #/AREA URNS LPF: ABNORMAL /LPF
PH UR STRIP: 6 PH (ref 5–7)
RBC #/AREA URNS AUTO: ABNORMAL /HPF
SOURCE: ABNORMAL
SP GR UR STRIP: 1.01 (ref 1–1.03)
SPECIMEN SOURCE: NORMAL
UROBILINOGEN UR STRIP-ACNC: 0.2 EU/DL (ref 0.2–1)
WBC #/AREA URNS AUTO: ABNORMAL /HPF
WET PREP SPEC: NORMAL

## 2020-01-16 PROCEDURE — 87210 SMEAR WET MOUNT SALINE/INK: CPT | Performed by: NURSE PRACTITIONER

## 2020-01-16 PROCEDURE — 99213 OFFICE O/P EST LOW 20 MIN: CPT | Performed by: NURSE PRACTITIONER

## 2020-01-16 PROCEDURE — 81001 URINALYSIS AUTO W/SCOPE: CPT | Performed by: NURSE PRACTITIONER

## 2020-01-16 ASSESSMENT — MIFFLIN-ST. JEOR: SCORE: 1207.36

## 2020-01-16 NOTE — PATIENT INSTRUCTIONS
Patient Education     Push fluids.  Consider starting probiotic.  Try putting over-the-counter hydrocortisone cream 1% once daily for 5 days to see if this improves the itching.  If you continue to have itching we can consider you using an estrogen cream but let me know.    Providers taking new patients:  -Cathy Miller, KENDRA  -Anusha Toscano, KENDRA  -Cheryl Bettencourt, KENDRA  -Dr. Glynn  -Dr. Monroe  -Dr. Monae    Preventing Vaginitis     Use mild, unscented soap when you bathe or shower to avoid irritating your vagina.    Vaginitis is irritation or infection of the vagina or vulva (the outside opening of the vagina). Vaginitis can be caused by bacteria, viruses, parasites, or yeast. Chemicals (such as in perfumes or soaps or in spermicides) can sometimes be a cause. Vaginitis can be caused by hormone changes in pregnancy or with menopause. You can help prevent vaginitis. Follow the tips below. And see your healthcare provider if you have any symptoms.  Hygiene    Avoid chemicals. Do not use vaginal sprays. Do not use scented toilet paper or tampons that are scented. Sprays and scents have chemicals that can irritate your vagina.    Do not douche unless you are told to by your healthcare provider. Douching is rarely needed. And it upsets the normal balance in the vagina.    Wash yourself well. Wash the outer vaginal area (vulva) every day with mild, unscented soap. Keep it as dry as possible.    Wipe correctly. Make sure to wipe from front to back after a bowel movement. This helps keep from spreading bacteria from your anus to your vagina.    Change your tampon often. During your period, make sure to change your tampon as often as directed on the package. This allows the normal flow of vaginal discharge and blood.  Lifestyle    Limit your number of sexual partners. The more partners you have, the greater your risk of infection. Using condoms helps reduce your risk.    Get enough sleep. Sleep helps keep your body s  immune system healthy. This helps you fight infection.    Lose weight, if needed. Excess weight can reduce air circulation around your vagina. This can increase your risk of infection.    Exercise regularly. Regular activity helps keep your body healthy.    Take antibiotics only as directed. Antibiotics can change the normal chemical balance in the vagina.    Clothing    Don t sit in wet clothes. Yeast thrives when it s warm and damp.    Don t wear tight pants. And don t wear tights, leggings, or hose without a cotton crotch. These types of clothing trap warmth and moisture.    Wear cotton underwear. Cotton lets air circulate around the vagina.  Symptoms of vaginitis    Irritation, swelling, or itching of the genital area    Vaginal discharge    Bad vaginal odor    Pain or burning during urination   Date Last Reviewed: 12/1/2016 2000-2019 [employer]. [address] [place] [id number]. All rights reserved. This information is not intended as a substitute for professional medical care. Always follow your healthcare professional's instructions.

## 2020-01-16 NOTE — PROGRESS NOTES
Subjective     Cristela Smith is a 54 year old female who presents to clinic today for the following health issues:    HPI   URINARY TRACT SYMPTOMS  Onset: 5 days     Description:            Vaginal Itching   Painful urination (Dysuria): YES- burns            Frequency: YES  Blood in urine (Hematuria): no   Delay in urine (Hesitency): YES    Intensity: 8/10    Progression of Symptoms:  worsening and same    Accompanying Signs & Symptoms:  Fever/chills: YES- has CRPS cannot tell   Flank pain YES  Nausea and vomiting: YES- nausea   Any vaginal symptoms: vaginal discharge and vaginal itching  Abdominal/Pelvic Pain: YES- pelvic     History:   History of frequent UTI's: YES  History of kidney stones: no   Sexually Active: YES  Possibility of pregnancy: No    Precipitating factors:   Using bathroom     Therapies Tried and outcome:Increase fluid intake    Last UC in Dec 2018 resistant to multiple drugs.  Had 99.8 temp a couple days ago, now resolved.  Hx of recurrent UTIs.  Has had multiple recent oral surgeries-has been on multiple antibiotics for this.  Gets yeast infections with antibiotics.  Had some thick white vaginal discharge.  Has been pushing fluids.    Of note, hysterectomy and oophorectomy many years ago.     Reviewed and updated as needed this visit by Provider  Meds  Problems         Review of Systems   Otherwise ROS is negative except as stated above.        Objective    BP 96/58 (BP Location: Right arm, Patient Position: Chair, Cuff Size: Adult Regular)   Pulse 66   Temp 98.5  F (36.9  C) (Tympanic)   Resp 16   Ht 1.524 m (5')   Wt 67.6 kg (149 lb)   SpO2 97%   BMI 29.10 kg/m    Body mass index is 29.1 kg/m .  Physical Exam   GENERAL: healthy, alert and no distress  ABDOMEN: soft, nontender   (female): some redness/excoriation of  external genitalia and vaginal mucosa pink, moist, well rugated  BACK: no CVA tenderness    Diagnostic Test Results:  Labs reviewed in Epic  Results for orders placed  or performed in visit on 01/16/20 (from the past 24 hour(s))   UA with Microscopic reflex to Culture   Result Value Ref Range    Color Urine Yellow     Appearance Urine Clear     Glucose Urine Negative NEG^Negative mg/dL    Bilirubin Urine Negative NEG^Negative    Ketones Urine Negative NEG^Negative mg/dL    Specific Gravity Urine 1.010 1.003 - 1.035    pH Urine 6.0 5.0 - 7.0 pH    Protein Albumin Urine Negative NEG^Negative mg/dL    Urobilinogen Urine 0.2 0.2 - 1.0 EU/dL    Nitrite Urine Negative NEG^Negative    Blood Urine Negative NEG^Negative    Leukocyte Esterase Urine Negative NEG^Negative    Source Midstream Urine     WBC Urine 0 - 5 OTO5^0 - 5 /HPF    RBC Urine O - 2 OTO2^O - 2 /HPF    Squamous Epithelial /LPF Urine Few FEW^Few /LPF    Bacteria Urine Few (A) NEG^Negative /HPF   Wet prep   Result Value Ref Range    Specimen Description Vagina     Wet Prep No Trichomonas seen     Wet Prep No clue cells seen     Wet Prep No yeast seen     Wet Prep Rare  WBC'S seen              Assessment & Plan       ICD-10-CM    1. Vaginitis and vulvovaginitis N76.0    2. Painful urination R30.9 UA with Microscopic reflex to Culture   3. Vaginal itching N89.8 Wet prep     Neg wet prep and UA today in clinic.  For vaginal itching will trial short course of topical hydrocortisone. Did discuss that I think she may benefit from topical estrogen cream which pt declines at this time.    Patient Instructions     Patient Education     Push fluids.  Consider starting probiotic.  Try putting over-the-counter hydrocortisone cream 1% once daily for 5 days to see if this improves the itching.  If you continue to have itching we can consider you using an estrogen cream but let me know.    Providers taking new patients:  -Cathy Miller, KENDRA  -Anusha Toscano, KENDRA  -Cheryl Bettencourt, KENDRA  -Dr. Renard Monae    Preventing Vaginitis     Use mild, unscented soap when you bathe or shower to avoid irritating your vagina.     Vaginitis is irritation or infection of the vagina or vulva (the outside opening of the vagina). Vaginitis can be caused by bacteria, viruses, parasites, or yeast. Chemicals (such as in perfumes or soaps or in spermicides) can sometimes be a cause. Vaginitis can be caused by hormone changes in pregnancy or with menopause. You can help prevent vaginitis. Follow the tips below. And see your healthcare provider if you have any symptoms.  Hygiene    Avoid chemicals. Do not use vaginal sprays. Do not use scented toilet paper or tampons that are scented. Sprays and scents have chemicals that can irritate your vagina.    Do not douche unless you are told to by your healthcare provider. Douching is rarely needed. And it upsets the normal balance in the vagina.    Wash yourself well. Wash the outer vaginal area (vulva) every day with mild, unscented soap. Keep it as dry as possible.    Wipe correctly. Make sure to wipe from front to back after a bowel movement. This helps keep from spreading bacteria from your anus to your vagina.    Change your tampon often. During your period, make sure to change your tampon as often as directed on the package. This allows the normal flow of vaginal discharge and blood.  Lifestyle    Limit your number of sexual partners. The more partners you have, the greater your risk of infection. Using condoms helps reduce your risk.    Get enough sleep. Sleep helps keep your body s immune system healthy. This helps you fight infection.    Lose weight, if needed. Excess weight can reduce air circulation around your vagina. This can increase your risk of infection.    Exercise regularly. Regular activity helps keep your body healthy.    Take antibiotics only as directed. Antibiotics can change the normal chemical balance in the vagina.    Clothing    Don t sit in wet clothes. Yeast thrives when it s warm and damp.    Don t wear tight pants. And don t wear tights, leggings, or hose without a cotton  crotch. These types of clothing trap warmth and moisture.    Wear cotton underwear. Cotton lets air circulate around the vagina.  Symptoms of vaginitis    Irritation, swelling, or itching of the genital area    Vaginal discharge    Bad vaginal odor    Pain or burning during urination   Date Last Reviewed: 12/1/2016 2000-2019 [employer]. [address] [place] [id number]. All rights reserved. This information is not intended as a substitute for professional medical care. Always follow your healthcare professional's instructions.               Return in about 1 week (around 1/23/2020), or if symptoms worsen or fail to improve.    Ashley Christopher NP  [facility]

## 2020-02-20 DIAGNOSIS — F41.9 ANXIETY: ICD-10-CM

## 2020-02-20 RX ORDER — HYDROXYZINE HYDROCHLORIDE 50 MG/1
100 TABLET, FILM COATED ORAL EVERY 6 HOURS PRN
Qty: 180 TABLET | Refills: 0 | Status: SHIPPED | OUTPATIENT
Start: 2020-02-20

## 2020-02-23 ENCOUNTER — HOSPITAL ENCOUNTER (EMERGENCY)
Facility: CLINIC | Age: 55
Discharge: HOME | End: 2020-02-23
Attending: EMERGENCY MEDICINE | Admitting: EMERGENCY MEDICINE
Payer: MEDICARE

## 2020-02-23 ENCOUNTER — APPOINTMENT (OUTPATIENT)
Dept: CT IMAGING | Facility: CLINIC | Age: 55
End: 2020-02-23
Attending: EMERGENCY MEDICINE
Payer: MEDICARE

## 2020-02-23 VITALS
OXYGEN SATURATION: 98 % | RESPIRATION RATE: 18 BRPM | HEART RATE: 68 BPM | DIASTOLIC BLOOD PRESSURE: 77 MMHG | TEMPERATURE: 98.2 F | SYSTOLIC BLOOD PRESSURE: 111 MMHG

## 2020-02-23 DIAGNOSIS — R00.2 PALPITATIONS: ICD-10-CM

## 2020-02-23 DIAGNOSIS — R68.89 ALTERATION IN BLOOD PRESSURE: ICD-10-CM

## 2020-02-23 DIAGNOSIS — R51.9 ACUTE NONINTRACTABLE HEADACHE, UNSPECIFIED HEADACHE TYPE: ICD-10-CM

## 2020-02-23 LAB
ANION GAP SERPL CALCULATED.3IONS-SCNC: 4 MMOL/L (ref 3–14)
BASOPHILS # BLD AUTO: 0.1 10E9/L (ref 0–0.2)
BASOPHILS NFR BLD AUTO: 0.7 %
BUN SERPL-MCNC: 12 MG/DL (ref 7–30)
CALCIUM SERPL-MCNC: 9.6 MG/DL (ref 8.5–10.1)
CHLORIDE SERPL-SCNC: 106 MMOL/L (ref 94–109)
CO2 SERPL-SCNC: 30 MMOL/L (ref 20–32)
CREAT SERPL-MCNC: 0.97 MG/DL (ref 0.52–1.04)
DIFFERENTIAL METHOD BLD: NORMAL
EOSINOPHIL # BLD AUTO: 0.2 10E9/L (ref 0–0.7)
EOSINOPHIL NFR BLD AUTO: 2.7 %
ERYTHROCYTE [DISTWIDTH] IN BLOOD BY AUTOMATED COUNT: 13.2 % (ref 10–15)
GFR SERPL CREATININE-BSD FRML MDRD: 66 ML/MIN/{1.73_M2}
GLUCOSE SERPL-MCNC: 92 MG/DL (ref 70–99)
HCT VFR BLD AUTO: 41.5 % (ref 35–47)
HGB BLD-MCNC: 13.3 G/DL (ref 11.7–15.7)
IMM GRANULOCYTES # BLD: 0 10E9/L (ref 0–0.4)
IMM GRANULOCYTES NFR BLD: 0.1 %
LYMPHOCYTES # BLD AUTO: 2.6 10E9/L (ref 0.8–5.3)
LYMPHOCYTES NFR BLD AUTO: 38.7 %
MAGNESIUM SERPL-MCNC: 1.8 MG/DL (ref 1.6–2.3)
MCH RBC QN AUTO: 28.8 PG (ref 26.5–33)
MCHC RBC AUTO-ENTMCNC: 32 G/DL (ref 31.5–36.5)
MCV RBC AUTO: 90 FL (ref 78–100)
MONOCYTES # BLD AUTO: 0.6 10E9/L (ref 0–1.3)
MONOCYTES NFR BLD AUTO: 8.6 %
NEUTROPHILS # BLD AUTO: 3.3 10E9/L (ref 1.6–8.3)
NEUTROPHILS NFR BLD AUTO: 49.2 %
NRBC # BLD AUTO: 0 10*3/UL
NRBC BLD AUTO-RTO: 0 /100
PLATELET # BLD AUTO: 271 10E9/L (ref 150–450)
POTASSIUM SERPL-SCNC: 3.3 MMOL/L (ref 3.4–5.3)
RBC # BLD AUTO: 4.62 10E12/L (ref 3.8–5.2)
SODIUM SERPL-SCNC: 140 MMOL/L (ref 133–144)
TROPONIN I SERPL-MCNC: <0.015 UG/L (ref 0–0.04)
WBC # BLD AUTO: 6.8 10E9/L (ref 4–11)

## 2020-02-23 PROCEDURE — 93005 ELECTROCARDIOGRAM TRACING: CPT

## 2020-02-23 PROCEDURE — 25000132 ZZH RX MED GY IP 250 OP 250 PS 637: Mod: GY | Performed by: EMERGENCY MEDICINE

## 2020-02-23 PROCEDURE — 70450 CT HEAD/BRAIN W/O DYE: CPT

## 2020-02-23 PROCEDURE — 80048 BASIC METABOLIC PNL TOTAL CA: CPT | Performed by: EMERGENCY MEDICINE

## 2020-02-23 PROCEDURE — 83735 ASSAY OF MAGNESIUM: CPT | Performed by: EMERGENCY MEDICINE

## 2020-02-23 PROCEDURE — 84484 ASSAY OF TROPONIN QUANT: CPT | Performed by: EMERGENCY MEDICINE

## 2020-02-23 PROCEDURE — 99285 EMERGENCY DEPT VISIT HI MDM: CPT | Mod: 25

## 2020-02-23 PROCEDURE — 85025 COMPLETE CBC W/AUTO DIFF WBC: CPT | Performed by: EMERGENCY MEDICINE

## 2020-02-23 RX ORDER — LORAZEPAM 1 MG/1
1 TABLET ORAL ONCE
Status: COMPLETED | OUTPATIENT
Start: 2020-02-23 | End: 2020-02-23

## 2020-02-23 RX ADMIN — LORAZEPAM 1 MG: 1 TABLET ORAL at 18:00

## 2020-02-23 ASSESSMENT — ENCOUNTER SYMPTOMS
FATIGUE: 1
NERVOUS/ANXIOUS: 1
WEAKNESS: 1
BLOOD IN STOOL: 0
CHILLS: 1
HEADACHES: 1
DIZZINESS: 1
NAUSEA: 1

## 2020-02-23 NOTE — ED AVS SNAPSHOT
[facility] Emergency Department  [address]  [facility] [address]  Phone:  [phone]  Fax:  [phone]                                    Cristela Smith   MRN: [id number]    Department:  [facility] Emergency Department   Date of Visit:  2/23/2020           After Visit Summary Signature Page    I have received my discharge instructions, and my questions have been answered. I have discussed any challenges I see with this plan with the nurse or doctor.    ..........................................................................................................................................  Patient/Patient Representative Signature      ..........................................................................................................................................  Patient Representative Print Name and Relationship to Patient    ..................................................               ................................................  Date                                   Time    ..........................................................................................................................................  Reviewed by Signature/Title    ...................................................              ..............................................  Date                                               Time          22EPIC Rev 08/18

## 2020-02-23 NOTE — ED PROVIDER NOTES
History     Chief Complaint:  Multiple Complaints      The history is provided by the patient.      Cristela Smith is a 54 year old female with a history of complex regional pain syndrome and migraines who presents with her  for evaluation of hypertension. The patient has a baseline blood pressure of 70/30 and a pulse of 40-50, but has had pressures of 101/78 and pulses of 102/150 for the past month and a half. She reports no new symptoms, just that she is worried about her blood pressure. She reports fatigue, nausea, tinnitus, chills, dizziness, headache, and blurry vision. She denies bloody stool.     She reports chronic pain from an old injury. She also reports that she has not been taking some of her medications because she is worried about her heart rate and she is trying to get off of them.     From 9/18/17  MRI brain w/o & w/ contrast:  Normal MRI of the brain, as per radiology.      Allergies:  Keflex [Cephalexin]  Aspirin  Sulfa Drugs  Topamax [Topiramate]  Toprol Xl [Metoprolol]  Adhesive Tape    Medications:    Albuterol   Ascorbic Acid   Atorvastatin   Beclomethasone   Butalbital-acetaminophen-caffeine   Cholecalciferol   Clonazepam   Cyanocobalamin    Estradiol   Fluticasone   Hydromorphone    Hydroxyzine    Hyoscyamine   Morphine   Movantik   Naloxone   Ondansetron   Oxycodone    Pregabalin   senna-docusate   Sertraline   Tizanidine    Trazodone      Past Medical History:    Allergic state  Anxiety  Cervical cancer  Complex regional pain syndrome type 1 affecting both upper arms  Depression  Fibromyalgia   Hyperlipidemia  Hypertension  Narcotic abuse in remission    Past Surgical History:    Back surgery  Cholecystectomy  Colonoscopy  Hysterectomy  L5-S1 laminectomy    Family History:    Depression  Diabetes  Heart disease    Social History:  Marital Status:   [4]  Presents with her .  Positive for tobacco use.  Negative for smokeless tobacco use.   Negative for alcohol  use.  Negative for drug use.     Review of Systems   Constitutional: Positive for chills and fatigue.   HENT: Positive for tinnitus.    Eyes: Positive for visual disturbance.   Gastrointestinal: Positive for nausea. Negative for blood in stool.   Neurological: Positive for dizziness, weakness and headaches.   Psychiatric/Behavioral: The patient is nervous/anxious.    All other systems reviewed and are negative.    Physical Exam     Patient Vitals for the past 24 hrs:   BP Temp Temp src Pulse Heart Rate Resp SpO2   02/23/20 1943 -- -- -- -- -- 18 --   02/23/20 1815 -- -- -- -- -- -- 98 %   02/23/20 1800 111/77 -- -- 68 67 -- 97 %   02/23/20 1740 -- -- -- -- 67 -- 97 %   02/23/20 1735 126/89 -- -- 70 67 -- 99 %   02/23/20 1730 -- -- -- -- 67 -- --   02/23/20 1715 106/81 98.2  F (36.8  C) Temporal 80 -- 16 96 %       Physical Exam  General: Adult female sitting upright  Eyes: PERRL, Conjunctive within normal limits. No scleral icterus. On nondilated examination fundi appear normal. No visible abnormalities of the retinal vessels or retina on limited examination.  ENT: Moist mucous membranes, oropharynx clear. TMs clear bilaterally.  Neck: No rigidity.  CV: Normal S1S2, no murmur, rub or gallop. Regular rate and rhythm  Resp: Clear to auscultation bilaterally, no wheezes, rales or rhonchi. Normal respiratory effort.  GI: Abdomen is soft, nontender and nondistended. No palpable masses. No rebound or guarding.  MSK: No edema. Nontender. Normal active range of motion.  Skin: Warm and dry. No rashes or lesions or ecchymoses on visible skin.  Neuro: Alert and oriented. Responds appropriately to all questions and commands. No focal findings appreciated. Normal muscle tone.  Psych: Normal affect    Emergency Department Course     ECG:  Indication: Hypertension  Time: 1731  Vent. Rate 66 bpm. KY interval 130. QRS duration 80. QT/QTc 410/429. P-R-T axis 32 30 31.  Normal sinus rhythm. Normal ECG. Read time:  1735    Imaging:  Radiology findings were communicated with the patient and family who voiced understanding of the findings.    CT head w/o IV contrast:   Normal head CT, as per radiology.    Laboratory:  Laboratory findings were communicated with the patient and family who voiced understanding of the findings.    CBC: WBC: 6.8, HGB: 13.3, PLT: 271    BMP: Glucose 92, Potassium: 3.3(L), o/w WNL (Creatinine: 0.97)    Magnesium: 1.8    1736 Troponin: <0.015      Procedures:  None    Interventions:  1800 Ativan, I mg, PO    Emergency Department Course:  Past medical records, nursing notes, and vitals reviewed.    1728 I performed an exam of the patient as documented above.     EKG obtained in the ED, see results above.   IV was inserted and blood was drawn for laboratory testing, results above.  The patient was sent for a head CT while in the emergency department, results above.     1927 I rechecked the patient and discussed the results of her workup thus far. Patient reports she feels ready to go home. She reports disappointment with being able to hear a staff discuss member talking loudly outside her door. No new physical concerns.    Findings and plan explained to the Patient and spouse. Patient discharged home with instructions regarding supportive care, medications, and reasons to return. The importance of close follow-up was reviewed.     I personally reviewed the laboratory and imaging results with the Patient and spouse and answered all related questions prior to discharge.     Impression & Plan     Medical Decision Making:  Germaine Smith is a 54 year old female who presents to the emergency department with multiple concerns, primarily today being headache and reporting that her veins seem inflamed and her heart is pounding rapidly and she is having fluctuations in her blood pressure. Tonight she is afebrile and does not appear to be septic. Her laboratory evaluations are unremarkable aside from just a slightly  low potassium which is not clinically concerning. I do not think this is the cause of her symptoms. She had no dysrhythmia on her EKG and her vital signs were stable, checked intermittently throughout her stay. She did not have any CT finding that would suggest mass effect or hydrocephalus. She had no focal neurologic deficit, I did not think further advanced neuro imaging would be indicated. She was not hypoxic nor had any respiratory distress. I feel comfortable at this point discharging her home with unclear cause of her symptoms, many of which are chronic. She understands that although no clear emergent pathology is found, she needs further evaluation as an outpatient, perhaps with more specialty services,  to look into her symptoms further. All questions answered prior to discharge. She is recommended to  return immediately if symptoms worsen.    Diagnosis:    ICD-10-CM    1. Acute nonintractable headache, unspecified headache type R51    2. Alteration in blood pressure R68.89    3. Palpitations R00.2        Disposition:  Discharged to home.    Scribe Disclosure:  I, Katiuska Ferreira, am serving as a scribe at 5:28 PM on 2/23/2020 to document services personally performed by Jyoti Sorto MD based on my observations and the provider's statements to me.     [facility] EMERGENCY DEPARTMENT       Jyoti Sorto MD  02/29/20 [id number]

## 2020-02-23 NOTE — ED TRIAGE NOTES
Patient presents reports blood pressure 101/78 & normally runs 70/30 with racing pulse [id number] with a pulse normally 40-50, shaky, veins on fire, heart in throat for the past 1 1/2 months Patient has seen primary and pain doctor for the issue. Patient reports she is scared to death and that is why she is here. ABC's intact

## 2020-02-24 LAB — INTERPRETATION ECG - MUSE: NORMAL

## 2020-02-25 DIAGNOSIS — R09.82 PND (POST-NASAL DRIP): Primary | ICD-10-CM

## 2020-02-26 RX ORDER — FLUTICASONE PROPIONATE 50 MCG
1-2 SPRAY, SUSPENSION (ML) NASAL DAILY PRN
Qty: 1 ML | Refills: 0 | Status: SHIPPED | OUTPATIENT
Start: 2020-02-26

## 2020-03-27 NOTE — PROGRESS NOTES
SUBJECTIVE:   Cristela Smith is a 53 year old female who presents to clinic today for the following health issues:    ED/UC Followup:    Facility:  [facility]  Date of visit: 8/20/18  Reason for visit: N&V  Current Status: still nauseated, lethargic, no appetite       Recent move. Having increased stress. Vomiting and diarrhea started almost 2 weeks ago. Very fatigued. Had both vomiting and diarrhea until recently. Diarrhea improving - last on Monday. Last vomiting was 2 days ago. Continues to have nausea. Had a migraine prior to the GI symptoms that lasted for 1.5 weeks. Decreased appetite. Seen in ED and electrolytes were ok. No know sick contacts.    Back pain: Had left sided epidural last Tuesday. Has not helped very much.    Dental issues: lots of dental issues due to medications causing dry mouth. Plans to do bone reconstruction in mouth next month with Dr. Burr. Goes up into nasal cavity. Wants her to see ENT first and make sure there is enough bone in the area and not having sinusitis prior to the surgery. Has a lot of stuffiness and issues with getting good air flow through nose at times.     CRPS: doing ok right night. Dr. Beyer is concerned it might flare when has dental surgery. Has protocol for vitamin C before surgery to try to help prevent flares. Because of CRPS and other medical issues is unable to drive. No longer able to get medical transportation through insurance and hard for her  to get her to appointments. Wondering what other options are available.    Bumps on scalps: gets bump frequently on scalp along crown. Will swell up and then drain fluid and then scab.    Reviewed and updated as needed this visit by clinical staff  Tobacco  Allergies  Meds  Problems  Med Hx  Surg Hx  Fam Hx  Soc Hx        Reviewed and updated as needed this visit by Provider  Allergies  Meds  Problems       -------------------------------------    ROS:  Constitutional, HEENT, cardiovascular,  "pulmonary, gi and gu systems are negative, except as otherwise noted.    OBJECTIVE:                                                    BP (!) 86/60 (BP Location: Right arm, Patient Position: Sitting, Cuff Size: Adult Regular)  Pulse 63  Temp 98.2  F (36.8  C) (Tympanic)  Ht 5' 0.05\" (1.525 m)  Wt 164 lb 1.6 oz (74.4 kg)  SpO2 95%  BMI 32 kg/m2   Body mass index is 32 kg/(m^2).  General Appearance: tired appearing, alert and no distress  Eyes:   no discharge, erythema.  Normal pupils.  Neck: Supple.  No adenopathy, no asymmetry, masses, or scars and thyroid normal to palpation  Respiratory: lungs clear to auscultation - no rales, rhonchi or wheezes.  Cardiovascular: regular rate and rhythm, normal S1 S2, no S3 or S4 and no murmur, click or rub.  No peripheral edema.  Abdomen: soft, nontender, no hepatosplenomegaly or masses, and bowel sounds normal  Skin: small skin colored bumps on scalp about 2-3 mm in size. no rashes or lesions.  Well perfused and normal turgor.    Diagnostic Test Results:  Reviewed labs from ED     ASSESSMENT/PLAN:                                                      (K52.9) Gastroenteritis  (primary encounter diagnosis)  Comment: suspect this is prolonged gastroenteritis. Labs in ED ok. Patient has propensity to get electrolyte imbalances when this occurs, so reassuring that labs ok. Overall, symptoms are improving.  Plan:   - discussed supportive cares  - if continues with symptoms, will consider stool culture and consider referral to GI    (F41.9) Anxiety  Comment: stable  Plan: hydrOXYzine (ATARAX) 50 MG tablet    (K59.09) Other constipation  Comment: typically with constipation when not with gastroenteritis. Needs refills.  Plan: senna-docusate (SENOKOT-S;PERICOLACE) 8.6-50 MG        per tablet    (F51.01) Primary insomnia  Comment: controlled  Plan: traZODone (DESYREL) 50 MG tablet    (Z87.19) History of dental problems  (J34.89) Nasal obstruction  Plan: OTOLARYNGOLOGY REFERRAL  - " referral to ENT per oral surgeon    (G56.41) Complex regional pain syndrome type 2 of right upper extremity  Plan: CARE COORDINATION REFERRAL  - pain managed by Dr. Beyer  - requests help with transportation options given unable to drive with CRPS and other issues    (L72.3) Sebaceous cyst  Comment: small cysts on scalp  Plan:   - discussed supportive cares - warm compresses  - if large and not going away, can refer to Derm    FUTURE APPOINTMENTS:       - Follow-up visit in 3-6 months    [facility]           REINA Abad

## 2020-03-28 DIAGNOSIS — F41.9 ANXIETY: ICD-10-CM

## 2020-03-28 NOTE — TELEPHONE ENCOUNTER
"HYDROXYZINE 50 MG PO tablet   Requested Prescriptions   Pending Prescriptions Disp Refills     hydrOXYzine (ATARAX) 50 MG tablet [Pharmacy Med Name: HYDROXYZINE HCL 50 MG TABLET] 180 tablet 0     Sig: TAKE 2 TABLETS (100 MG) BY MOUTH EVERY 6 HOURS AS NEEDED FOR ITCHING OR ANXIETY       Antihistamines Protocol Passed - 3/28/2020 10:21 AM        Passed - Recent (12 mo) or future (30 days) visit within the authorizing provider's specialty     Patient has had an office visit with the authorizing provider or a provider within the authorizing providers department within the previous 12 mos or has a future within next 30 days. See \"Patient Info\" tab in inbasket, or \"Choose Columns\" in Meds & Orders section of the refill encounter.              Passed - Patient is age 3 or older     Apply age and/or weight-based dosing for peds patients age 3 and older.    Forward request to provider for patients under the age of 3.          Passed - Medication is active on med list             "

## 2020-03-30 RX ORDER — HYDROXYZINE HYDROCHLORIDE 50 MG/1
100 TABLET, FILM COATED ORAL EVERY 6 HOURS PRN
Qty: 180 TABLET | Refills: 0 | OUTPATIENT
Start: 2020-03-30

## 2020-03-30 NOTE — TELEPHONE ENCOUNTER
Message sent to pharm:     Provider moved to another location- [facility]-please contact:    Halle De Los Santos MD    [address] [place] [address] [phone] (Fax)        Marina Johnson RN

## 2020-06-16 DIAGNOSIS — K59.03 DRUG-INDUCED CONSTIPATION: ICD-10-CM

## 2020-06-16 DIAGNOSIS — K59.09 CHRONIC CONSTIPATION: ICD-10-CM

## 2020-06-16 DIAGNOSIS — G89.4 CHRONIC PAIN SYNDROME: ICD-10-CM

## 2020-06-16 RX ORDER — NALOXEGOL OXALATE 25 MG/1
TABLET, FILM COATED ORAL
Qty: 30 TABLET | Refills: 4 | OUTPATIENT
Start: 2020-06-16

## 2020-06-16 NOTE — TELEPHONE ENCOUNTER
Patient has established with Dr. De Los Santos at [facility], refill needs to be sent to her there.    Brenda Monae MD  Internal Medicine-Pediatrics

## 2020-12-13 ENCOUNTER — HEALTH MAINTENANCE LETTER (OUTPATIENT)
Age: 55
End: 2020-12-13

## 2020-12-14 ENCOUNTER — TELEPHONE (OUTPATIENT)
Dept: PEDIATRICS | Facility: CLINIC | Age: 55
End: 2020-12-14

## 2020-12-14 NOTE — TELEPHONE ENCOUNTER
Patient Quality Outreach      Summary:        Patient is due/failing the following:   PHQ-9 Needed    Type of outreach:    Sent [employer] message.    Questions for provider review:    None                                                                                                                                     Lissette Menjivar CMA([facility])

## 2020-12-22 DIAGNOSIS — L30.9 DERMATITIS: ICD-10-CM

## 2020-12-23 RX ORDER — TRIAMCINOLONE ACETONIDE 1 MG/G
CREAM TOPICAL 2 TIMES DAILY PRN
Qty: 45 G | Refills: 0 | Status: SHIPPED | OUTPATIENT
Start: 2020-12-23

## 2021-01-25 NOTE — TELEPHONE ENCOUNTER
Patient Quality Outreach 2nd Attempt      Summary:    Type of outreach:    Sent [employer] message.    Next Steps:  Reach out within 90 days via Phone.    Max number of attempts reached: No. Will try again in 90 days if patient still on fail list.    Questions for provider review:    None                                                                                                                    Lissette Menjivar CMA([facility])

## 2021-02-21 ENCOUNTER — HEALTH MAINTENANCE LETTER (OUTPATIENT)
Age: 56
End: 2021-02-21

## 2021-03-02 NOTE — TELEPHONE ENCOUNTER
Patient Quality Outreach Summary      Summary:    Patient is due/failing the following:   PHQ-9 Needed    Type of outreach:    Phone, left message for patient/parent to call back.    Questions for provider review:    None                                                                                                                    Lissette Menjivar CMA([facility])

## 2021-09-26 ENCOUNTER — HEALTH MAINTENANCE LETTER (OUTPATIENT)
Age: 56
End: 2021-09-26

## 2022-01-16 ENCOUNTER — HEALTH MAINTENANCE LETTER (OUTPATIENT)
Age: 57
End: 2022-01-16

## 2022-03-13 ENCOUNTER — HEALTH MAINTENANCE LETTER (OUTPATIENT)
Age: 57
End: 2022-03-13

## 2023-01-14 ENCOUNTER — HEALTH MAINTENANCE LETTER (OUTPATIENT)
Age: 58
End: 2023-01-14

## 2023-04-23 ENCOUNTER — HEALTH MAINTENANCE LETTER (OUTPATIENT)
Age: 58
End: 2023-04-23

## 2024-05-06 NOTE — TELEPHONE ENCOUNTER
Patient left VM message-she was seen by Anupam Quiroz last week and diagnosed with sinusitis and reactive airway disease.  She took antibiotics incorrectly-she has been taking two pills twice daily instead of one pill twice daily.  Is not feeling any better.  Is still very fatigued.  Temp ranging .2.  Today temp is 99.  Still having difficulty sleeping at night-has tried melatonin.    Asking if antibiotic needs to be extended as she took it incorrectly?    Recheck in office?  DAVID Cassidy RN        
Patient notified of message below.  Voices understanding of instructions.  Will call back for an appointment if no improvement.  DAVID Cassidy RN    
Pt called back wanted to know if she should go to a thanksgiving function. Tomorrow. Verified with nurse that since the pt still has a fever for 99.1 tp 99.4 that she should not go. Was suggested to face time or use Skype.  
Will extend another 5 days of 1 pill twice daily for 5 days. If no improvement at that time, should be re-evaluated in clinic. Please let know.    Halle De Los Santos MD  Internal Medicine/Pediatrics    
No

## 2025-04-24 ENCOUNTER — TRANSFERRED RECORDS (OUTPATIENT)
Dept: ADMINISTRATIVE | Facility: CLINIC | Age: 60
End: 2025-04-24
Payer: MEDICARE

## 2025-04-25 NOTE — PROGRESS NOTES
_________________________________________________________________________________________________    [address] [address]  [place] [address]  [phone]      Patient:            Cristela Smith   YOB: 1965  Date:                    4/24/2025  Historian:    self  Visit Type:              Office Visit   Rendering Provider:  Nereyda Roes NP    History of Present Illness:    This is a 60- year-old female who is seen in the [facility] today who is present with her  Cecil.  A total of 40 minutes was spent on this encounter.  She presents for ongoing management of chronic nausea, chronic GERD with episodes of regurgitation, irritable bowel syndrome constipation predominant, therapeutic opioid-induced constipation and today reporting painful internal/external hemorrhoids.  Her medical history is complex.  She has fibromyalgia, complex regional pain syndrome after a fall with concussion about 10 years ago, chronic migraine headaches, depression and anxiety, , herniated disc and oxycodone as needed. She had COVID-19 with hospitalization for Legionella pneumonia and H. influenzae pneumonia in February 2022 and remains on oxygen at night.  She has been diagnosed with small fiber neuropathy followed by oncology in the past, she has difficulty tolerating medications due to poor metabolism and depression.  She has a history of cervical cancer and has had a hysterectomy, oophorectomy and saphlingectomy, she is on medical marijuana.  She has a history of C. difficile infection.     She is on nocturnal oxygen.  She was having nocturnal regurgitation which she did not discuss today. She elevates head of bed and uses pantoprazole 40 mg 2 times daily.  She discontinued the Pepcid since it was not helpful and interfered with tizanidine.  She has tried promethazine and Compazine which have not been helpful.  The regurgitation happens about 3 times a year.    Currently it appears that constipation is well-managed for  her but still problematic.  She uses lubiprostone 24 mcg 2 times daily, 2 senna at night and 1 senna in the morning.  The next day she will have frequent trips to the bathroom over a 2-hour duration with small formed stools.  Occasionally she will feel constipated and use Movantik 25 mg which causes diarrhea.  She has tried to use this on a daily basis as a monotherapy without benefit.  If she travels she will pull back on her laxative regiment and then will have significant constipation for over a week.  She has tried to increase the senna to 2 tablets 2 times daily which has caused nausea.  She has used enemas in the past, Benefiber and MiraLAX without benefit.    She reports that her hemorrhoids have been very painful.  She has noted to have internal and external hemorrhoids with colonoscopy procedure.  There is no bleeding but she believes that they are contributing to difficulty with defecation.  She has failed Preparation H and witch hazel.    Her last CMP was reviewed from her [facility] from October 8, 2024 which showed a stable creatinine of 1.21 with a GFR of 50 otherwise within normal range.    She continues to endorse a poor appetite.  She has frequent episodes of nausea after eating.  She has gained about 15 pounds over the last year.    She was diagnosed with some sort of inflammatory bowel disease through the [facility] 30 years ago.     She has followed with neurology in the past for her small fiber neuropathy but is no longer following with the team.  She is on 200 mg of trazodone at bedtime and lyrica.  Every 2 to 3 months she has these nocturnal incidences of diaphoresis with both fecal and urinary incontinence.  The episodes can last 12 to 17 days.  Due to just some misunderstanding with her disability she is losing her insurance the end of this month and is uncertain when this will be reinstated.  She is meeting with her primary care provider on April 30 for further evaluation.    Past testing  gastric emptying study in 2023 showed a 4-hour retention of 20%.  X-ray showed formed stool despite reports of diarrhea.  Colorectal surgery pelvic floor consult showed normal pelvic floor function with recommendations for trial of enemas.  CT scan 2021, 2022 in 2023 which did not show constipation.    EGD 5/30/2023 showed normal small bowel, stomach and esophageal biopsies.  Mucosa appeared normal.  EGD August 2022 showed retained food in her stomach.  Colonoscopy in May 2023 for rectal bleeding showed fair prep, melanosis coli, and white hyperplastic polyp.  colonoscopy in 2019 showed adenomatous polyp.    Assessment/Plan  # Detail Type Description   1. Assessment Constipation, unspecified (K59.00).   2. Assessment Irritable bowel syndrome with constipation (K58.1).   3. Assessment Nausea (R11.0).   4. Assessment Therapeutic opioid induced constipation (K59.03).   5. Assessment Hemorrhoids, unspecified hemorrhoid type (K64.9).   6. Assessment Gastro-esophageal reflux disease without esophagitis (K21.9).     Detail Type Description   Impression This is a 60- year-old female who is seen in the [facility] today who is present with her  Cecil.  A total of 40 minutes was spent on this encounter.  She presents for ongoing management of chronic nausea, chronic GERD with episodes of regurgitation, irritable bowel syndrome constipation predominant, therapeutic opioid-induced constipation and today reporting painful internal/external hemorrhoids.  Her medical history is complex.  She has fibromyalgia, complex regional pain syndrome after a fall with concussion about 10 years ago, chronic migraine headaches, depression and anxiety, , herniated disc and oxycodone as needed. She had COVID-19 with hospitalization for Legionella pneumonia and H. influenzae pneumonia in February 2022 and remains on oxygen at night.  She has been diagnosed with small fiber neuropathy followed by oncology in the past, she has difficulty tolerating  medications due to poor metabolism and depression.  She has a history of cervical cancer and has had a hysterectomy, oophorectomy and saphlingectomy, she is on medical marijuana.  She has a history of C. difficile infection. She is on nocturnal oxygen     1.  Hemorrhoids likely contributed to difficulties with defecation.  I offered referral to the colorectal surgery group but she is losing insurance.  A prescription was sent for nifedipine.  I also reviewed increasing fiber in her diet.    2.  Nausea chronic on a daily basis.  Could be a component of poorly controlled GERD, functional dyspepsia or gastroparesis..  Has been refractory to promethazine and Compazine.  She does have some response to ondansetron which can be constipating.  She is on a long list of medications.  she is open to cyproheptadine. we briefly discussed the gastric neurostimulator but she is reluctant and will not have insurance.  She follows a gastroparesis diet.     3. GERD poorly controlled likely due to gastroparesis, with normal appearing esophagus with EGD evaluation In the past.     4. constipation likely exacerbated by hemorrhoids and opioids. Pelvic floor testing normal . continue current therapies.     Detail Type Description   Provider Plan continue movantik 25 mg as needed   continue senna 1 tablet in AM and 2 PM   continue lubiprostone 24 mcg two times daily     start nifedipine ointment for internal external hemorrhoids  apply pea drop size drop to tip of finder, insert to second knuckle and rub internally three times daily     increase dietary fiber to 30 g per day- if needed add a fiber supplement.     continue pantoprazole daily    check with your PCP regarding checking cortisol and thyroid levels for nocturnal symptoms- consider additional neuromodulators?     continue zofran 8 mg as needed every 8 hours   continue to follow with the gastroparesis support group   cyproheptadine taking 1/2 tablet three times daily at first then  1 tablet three times daily   consider placement of the gastric neurostimulator.     please let me know  if there is anything I can do.     Orders    Instruction(s)/Education:  Instruction/Education Timeframe Assessment   high fiber diet  K59.00     Follow-up visit/Referral:  Order Comments   follow-up visit with Count includes the [facility] 6 Months        cc:  Halle De Los Santos MD  cc:       Electronically Signed by:  Nereyda Rose NP  04/24/2025 11:59 AM      Medications:  Medication Dose Sig Description Comments   Amitiza 24 mcg capsule 24 mcg take 1 capsule by oral route 2 times every day with food and water    atorvastatin 40 mg tablet 40 mg take 1 tablet by oral route  every day    vitamin B complex tablet  take 1 Tablet by Oral route once taking as directed   chlorzoxazone 500 mg tablet 500 mg take 1 tablet by oral route 3 times every day    clonazepam 0.5 mg tablet 0.5 mg take 1 Tablet by oral route  every bedtime as needed    cyproheptadine 4 mg tablet 4 mg take 1 tablet by oral route 3 times every day    famotidine 40 mg tablet 40 mg take 1 tablet by oral route  every day at bedtime    folic acid 1 mg tablet 1 mg take 2 tablet by oral route  every day    LC-655 UNKNOWN take 1 tablet daily    Lyrica 100 mg capsule 100 mg take 1 capsule by oral route 2 times every day taking as directed   Movantik 25 mg tablet 25 mg take 1 tablet by oral route  every day in the morning    Nifedipine 0.2 % Oint 90 GM Nifedipine 0.2 % Oint 90 GM RECTAL Nifedipine 0.2 % Oint 90 GM apply pea size drop to tip of finger, insert to second knuckle, rub internally then externally three times daily x 3  months.    ondansetron 8 mg disintegrating tablet 8 mg take 1 tablet by oral route  every 8 hours as needed for nausea    oxycodone 5 mg tablet 5 mg take 1 Tablet by oral route  every 4 hours as needed for pain taking as directed   pantoprazole 40 mg tablet,delayed release 40 mg take 1 tablet by ORAL route 2 times every day    polyethylene glycol 3350 17  gram/dose oral powder 17 gram/dose take 238 g mixed with 64 oz of gatorade drinking 8 oz every 15 minutes until gone for bowel cleanse weekly as needed.    senna 8.6 mg tablet 8.6 mg take 2 tablets two times daily    trazodone 50 mg tablet 50 mg take 4 tablet by oral route  every bedtime after meals      Allergies:  Medication Name Ingredient Reaction Comment    LATEX Hives    Cipro SULFAMETHIZOLE Angioedema    Cipro CIPROFLOXACIN HCL Angioedema    Motrin IBUPROFEN Digestive issues    Celebrex CELECOXIB Angioedema    Keflex CEPHALEXIN MONOHYDRATE Angioedema    Cephalosporins CEPHALOSPORINS Angioedema    Naprosyn NAPROXEN Can not take any longer    Celebrex CELECOXIB Dizziness    Bactrim/Septra SULFAMETHOXAZOLE Angioedema    acetaminophen ACETAMINOPHEN Angioedema    Bactrim/Septra TRIMETHOPRIM Angioedema    Keflex CEPHALEXIN MONOHYDRATE Angioedema     SULFA (SULFONAMIDE ANTIBIOTICS) Oral sores     ASPIRIN Reaction Unknown     ADHESIVE TAPE Hives    KEFLEX CEPHALEXIN MONOHYDRATE Angioedema    Keflex CEPHALEXIN MONOHYDRATE Angioedema    Celebrex CELECOXIB Dizziness      Vitals:  BP mm/Hg Pulse/min Resp/min Temp F Height (Total in.) Weight (lbs.) Weight (oz.) BMI   105/65 64   61.00 155.00  29.29     Smoking Status:    Use Status Type Smoking Status Usage Per Day Years Used Total Pack Years   yes  Current every day smoker      yes  Current every day smoker        Race:  White  Ethnicity:  Unknown  Preferred Language:  English  Total time of the encounter was 40 minutes which includes time spent face-to-face with the patient as well as non-face-to-face time personally spend by the provider and/or other qualified health care professionals.

## (undated) DEVICE — ENDO TRAP POLYP QUICK CATCH 710201

## (undated) DEVICE — ENDO SNARE POLYPECTOMY OVAL 15MM LOOP SD-240U-15

## (undated) DEVICE — KIT ENDO TURNOVER/PROCEDURE W/CLEAN A SCOPE LINERS 103888

## (undated) RX ORDER — ONDANSETRON 2 MG/ML
INJECTION INTRAMUSCULAR; INTRAVENOUS
Status: DISPENSED
Start: 2019-05-13

## (undated) RX ORDER — FENTANYL CITRATE 50 UG/ML
INJECTION, SOLUTION INTRAMUSCULAR; INTRAVENOUS
Status: DISPENSED
Start: 2019-05-13